# Patient Record
Sex: FEMALE | Race: WHITE | Employment: UNEMPLOYED | RURAL
[De-identification: names, ages, dates, MRNs, and addresses within clinical notes are randomized per-mention and may not be internally consistent; named-entity substitution may affect disease eponyms.]

---

## 2017-06-12 ENCOUNTER — OFFICE VISIT (OUTPATIENT)
Dept: FAMILY MEDICINE CLINIC | Age: 62
End: 2017-06-12

## 2017-06-12 VITALS
OXYGEN SATURATION: 97 % | WEIGHT: 95 LBS | DIASTOLIC BLOOD PRESSURE: 82 MMHG | TEMPERATURE: 97.9 F | RESPIRATION RATE: 18 BRPM | SYSTOLIC BLOOD PRESSURE: 152 MMHG | HEIGHT: 66 IN | HEART RATE: 77 BPM | BODY MASS INDEX: 15.27 KG/M2

## 2017-06-12 DIAGNOSIS — J43.8 OTHER EMPHYSEMA (HCC): Primary | ICD-10-CM

## 2017-06-12 DIAGNOSIS — R63.6 UNDERWEIGHT: ICD-10-CM

## 2017-06-12 DIAGNOSIS — E11.9 DIABETES MELLITUS TYPE 2, DIET-CONTROLLED (HCC): ICD-10-CM

## 2017-06-12 DIAGNOSIS — Z72.0 TOBACCO ABUSE: ICD-10-CM

## 2017-06-12 RX ORDER — FLUTICASONE PROPIONATE 110 UG/1
2 AEROSOL, METERED RESPIRATORY (INHALATION) EVERY 12 HOURS
Qty: 1 INHALER | Refills: 5 | Status: SHIPPED | OUTPATIENT
Start: 2017-06-12 | End: 2017-07-05 | Stop reason: ALTCHOICE

## 2017-06-12 NOTE — PROGRESS NOTES
Reviewed record in preparation for visit and have necessary documentation  Pt did not bring medication to office visit for review  opportunity was given for questions  Goals that were addressed and/or need to be completed during or after this appointment include    Health Maintenance Due   Topic Date Due    DTaP/Tdap/Td series (1 - Tdap) 07/15/1976    ZOSTER VACCINE AGE 60>  07/15/2015    BREAST CANCER SCRN MAMMOGRAM  07/24/2015    EYE EXAM RETINAL OR DILATED Q1  08/06/2016    HEMOGLOBIN A1C Q6M  09/30/2016    MICROALBUMIN Q1  03/30/2017    LIPID PANEL Q1  03/30/2017

## 2017-06-12 NOTE — MR AVS SNAPSHOT
Visit Information Date & Time Provider Department Dept. Phone Encounter #  
 6/12/2017  2:20 PM Tara Walls MD [facility] [id number] Upcoming Health Maintenance Date Due DTaP/Tdap/Td series (1 - Tdap) 7/15/1976 ZOSTER VACCINE AGE 60> 7/15/2015 BREAST CANCER SCRN MAMMOGRAM 7/24/2015 EYE EXAM RETINAL OR DILATED Q1 8/6/2016 HEMOGLOBIN A1C Q6M 9/30/2016 MICROALBUMIN Q1 3/30/2017 LIPID PANEL Q1 3/30/2017 INFLUENZA AGE 9 TO ADULT 8/1/2017 PAP AKA CERVICAL CYTOLOGY 10/22/2017 FOOT EXAM Q1 10/24/2017 Allergies as of 6/12/2017  Review Complete On: 6/12/2017 By: Tara Walls MD  
  
 Severity Noted Reaction Type Reactions Evista [Raloxifene] Medium 10/02/2013   Side Effect Diarrhea, Swelling Makes throat swell Doxycycline  06/11/2010    Swelling Tongue Pcn [Penicillins]  06/05/2010    Swelling Of the tongue Spiriva With Handihaler [Tiotropium Bromide]  06/11/2010    Other (comments) Chest pain Current Immunizations  Reviewed on 10/4/2016 Name Date H1N1 FLU VACCINE 1/25/2010 Influenza Vaccine 10/19/2015, 10/2/2013 Influenza Vaccine (Quad) PF 10/4/2016 Influenza Vaccine Split 10/31/2012, 10/12/2011,  Deferred (Patient Refused) Influenza Vaccine Whole 1/25/2010 Pneumococcal Polysaccharide (PPSV-23) 3/30/2016 Not reviewed this visit You Were Diagnosed With   
  
 Codes Comments Other emphysema ([facility]    -  Primary ICD-10-CM: J43.8 ICD-9-CM: 492.8 Diabetes mellitus type 2, diet-controlled ([facility]     ICD-10-CM: E11.9 ICD-9-CM: 250.00 Vitals BP Pulse Temp Resp Height(growth percentile) Weight(growth percentile) 152/82 (BP 1 Location: Right arm, BP Patient Position: Sitting) 77 97.9 °F (36.6 °C) (Oral) 18 5' 6\" (1.676 m) 95 lb (43.1 kg) SpO2 BMI OB Status Smoking Status 97% 15.33 kg/m2 Postmenopausal Current Some Day Smoker Vitals History BMI and BSA Data Body Mass Index Body Surface Area  
 15.33 kg/m 2 1.42 m 2 Preferred Pharmacy Pharmacy Name Phone [facility] [place] [phone] Your Updated Medication List  
  
   
This list is accurate as of: 6/12/17  3:41 PM.  Always use your most recent med list.  
  
  
  
  
 COMBIVENT RESPIMAT  mcg/actuation inhaler Generic drug:  ipratropium-albuterol USE 1 PUFF BY MOUTH EVERY 6 HOURS  
  
 cyclobenzaprine 10 mg tablet Commonly known as:  FLEXERIL Take 1 Tab by mouth three (3) times daily as needed for Muscle Spasm(s). fluticasone 110 mcg/actuation inhaler Commonly known as:  FLOVENT HFA Take 2 Puffs by inhalation every twelve (12) hours. glucose blood VI test strips strip Commonly known as:  ONETOUCH ULTRA TEST Test blood sugar  daily for DM-2 (250.00) ibandronate 150 mg tablet Commonly known as:  [place] Take 150 mg by mouth every thirty (30) days. Lancets Misc One Touch Lancets-Test blood sugar  daily for DM-2 (250.00) LYRICA 50 mg capsule Generic drug:  pregabalin TAKE 1 CAPSULE BY MOUTH DAILY  
  
 oxyCODONE-acetaminophen  mg per tablet Commonly known as:  PERCOCET 10 Take 1 Tab by mouth. zafirlukast 10 mg tablet Commonly known as:  ACCOLATE  
TAKE 1 TABLET BY MOUTH DAILY WITH LUNCH Prescriptions Sent to Pharmacy Refills  
 fluticasone (FLOVENT HFA) 110 mcg/actuation inhaler 5 Sig: Take 2 Puffs by inhalation every twelve (12) hours. Class: Normal  
 Pharmacy: [facility] [place] Ph #: [phone] Route: Inhalation We Performed the Following CBC WITH AUTOMATED DIFF [42615 CPT(R)] HEMOGLOBIN A1C WITH EAG [78866 CPT(R)] METABOLIC PANEL, COMPREHENSIVE [82941 CPT(R)] TSH 3RD GENERATION [27564 CPT(R)] Introducing [facility]! Porsche Layne introduces Evrent patient portal. Now you can access parts of your medical record, email your doctor's office, and request medication refills online. 1. In your internet browser, go to https://[email or web address]. [employer] 2. Click on the First Time User? Click Here link in the Sign In box. You will see the New Member Sign Up page. 3. Enter your Evrent Access Code exactly as it appears below. You will not need to use this code after youve completed the sign-up process. If you do not sign up before the expiration date, you must request a new code. · Evrent Access Code: [email or web address] Expires: 9/10/2017  1:55 PM 
 
4. Enter the last four digits of your Social Security Number (xxxx) and Date of Birth (mm/dd/yyyy) as indicated and click Submit. You will be taken to the next sign-up page. 5. Create a Evrent ID. This will be your Evrent login ID and cannot be changed, so think of one that is secure and easy to remember. 6. Create a Evrent password. You can change your password at any time. 7. Enter your Password Reset Question and Answer. This can be used at a later time if you forget your password. 8. Enter your e-mail address. You will receive e-mail notification when new information is available in [address] 9. Click Sign Up. You can now view and download portions of your medical record. 10. Click the Download Summary menu link to download a portable copy of your medical information. If you have questions, please visit the Frequently Asked Questions section of the Evrent website. Remember, Evrent is NOT to be used for urgent needs. For medical emergencies, dial 911. Now available from your iPhone and Android! Please provide this summary of care documentation to your next provider. Your primary care clinician is listed as [address]. If you have any questions after today's visit, please call [phone].

## 2017-06-13 LAB
ALBUMIN SERPL-MCNC: 4.7 G/DL (ref 3.6–4.8)
ALBUMIN/GLOB SERPL: 2 {RATIO} (ref 1.2–2.2)
ALP SERPL-CCNC: 66 IU/L (ref 39–117)
ALT SERPL-CCNC: 17 IU/L (ref 0–32)
AST SERPL-CCNC: 24 IU/L (ref 0–40)
BASOPHILS # BLD AUTO: 0.1 X10E3/UL (ref 0–0.2)
BASOPHILS NFR BLD AUTO: 1 %
BILIRUB SERPL-MCNC: 0.2 MG/DL (ref 0–1.2)
BUN SERPL-MCNC: 12 MG/DL (ref 8–27)
BUN/CREAT SERPL: 21 (ref 12–28)
CALCIUM SERPL-MCNC: 10 MG/DL (ref 8.7–10.3)
CHLORIDE SERPL-SCNC: 102 MMOL/L (ref 96–106)
CO2 SERPL-SCNC: 23 MMOL/L (ref 18–29)
CREAT SERPL-MCNC: 0.56 MG/DL (ref 0.57–1)
EOSINOPHIL # BLD AUTO: 0.3 X10E3/UL (ref 0–0.4)
EOSINOPHIL NFR BLD AUTO: 3 %
ERYTHROCYTE [DISTWIDTH] IN BLOOD BY AUTOMATED COUNT: 13.4 % (ref 12.3–15.4)
EST. AVERAGE GLUCOSE BLD GHB EST-MCNC: 108 MG/DL
GLOBULIN SER CALC-MCNC: 2.4 G/DL (ref 1.5–4.5)
GLUCOSE SERPL-MCNC: 63 MG/DL (ref 65–99)
HBA1C MFR BLD: 5.4 % (ref 4.8–5.6)
HCT VFR BLD AUTO: 43.7 % (ref 34–46.6)
HGB BLD-MCNC: 14.6 G/DL (ref 11.1–15.9)
IMM GRANULOCYTES # BLD: 0 X10E3/UL (ref 0–0.1)
IMM GRANULOCYTES NFR BLD: 0 %
LYMPHOCYTES # BLD AUTO: 1.4 X10E3/UL (ref 0.7–3.1)
LYMPHOCYTES NFR BLD AUTO: 14 %
Lab: NORMAL
MCH RBC QN AUTO: 34.7 PG (ref 26.6–33)
MCHC RBC AUTO-ENTMCNC: 33.4 G/DL (ref 31.5–35.7)
MCV RBC AUTO: 104 FL (ref 79–97)
MONOCYTES # BLD AUTO: 0.8 X10E3/UL (ref 0.1–0.9)
MONOCYTES NFR BLD AUTO: 8 %
NEUTROPHILS # BLD AUTO: 7.2 X10E3/UL (ref 1.4–7)
NEUTROPHILS NFR BLD AUTO: 74 %
PLATELET # BLD AUTO: 244 X10E3/UL (ref 150–379)
POTASSIUM SERPL-SCNC: 5.3 MMOL/L (ref 3.5–5.2)
PROT SERPL-MCNC: 7.1 G/DL (ref 6–8.5)
RBC # BLD AUTO: 4.21 X10E6/UL (ref 3.77–5.28)
SODIUM SERPL-SCNC: 143 MMOL/L (ref 134–144)
TSH SERPL DL<=0.005 MIU/L-ACNC: 0.72 UIU/ML (ref 0.45–4.5)
WBC # BLD AUTO: 9.6 X10E3/UL (ref 3.4–10.8)

## 2017-06-19 NOTE — PROGRESS NOTES
Progress Note    Patient: Jg Adam MRN: [id number]  SSN: [id number]    YOB: 1955  Age: 64 y.o. Sex: female        Subjective:     Chief Complaint   Patient presents with    Diabetes    COPD     upset about inhaler refills, needs rescue inhaler        HPI: she is a 64y.o. year old female new to me who presents for follow up of COPD. She continues to smoke and experience SOB. Patient using combivent multiple times daily. Has been prescribed maintenance inhalers. However she does not use these. Patient denies HA, dizziness, CP, abdominal pain, dysuria, acute myalgias or arthralgias. She is underweight with continued weight loss. Encounter Diagnoses   Name Primary?  Other emphysema ([facility] [address] Yes    Underweight     Diabetes mellitus type 2, diet-controlled ([facility]     Tobacco abuse        BP Readings from Last 3 Encounters:   06/12/17 152/82   12/05/16 139/77   10/04/16 134/65     Wt Readings from Last 3 Encounters:   06/12/17 95 lb (43.1 kg)   12/05/16 98 lb (44.5 kg)   10/04/16 97 lb (44 kg)     Body mass index is 15.33 kg/(m^2).     Lab Results   Component Value Date/Time    WBC 9.6 06/12/2017 04:45 PM    HGB 14.6 06/12/2017 04:45 PM    HCT 43.7 06/12/2017 04:45 PM    PLATELET 328 59/87/5096 04:45 PM     06/12/2017 04:45 PM     Lab Results  Component Value Date/Time   TSH 0.722 06/12/2017 04:45 PM      Lab Results   Component Value Date/Time    Sodium 143 06/12/2017 04:45 PM    Potassium 5.3 06/12/2017 04:45 PM    Chloride 102 06/12/2017 04:45 PM    CO2 23 06/12/2017 04:45 PM    Anion gap 9 02/18/2014 04:11 PM    Glucose 63 06/12/2017 04:45 PM    BUN 12 06/12/2017 04:45 PM    Creatinine 0.56 06/12/2017 04:45 PM    BUN/Creatinine ratio 21 06/12/2017 04:45 PM    GFR est  06/12/2017 04:45 PM    GFR est non- 06/12/2017 04:45 PM    Calcium 10.0 06/12/2017 04:45 PM    Bilirubin, total 0.2 06/12/2017 04:45 PM    ALT (SGPT) 17 06/12/2017 04:45 PM    AST (SGOT) 24 06/12/2017 04:45 PM    Alk. phosphatase 66 06/12/2017 04:45 PM    Protein, total 7.1 06/12/2017 04:45 PM    Albumin 4.7 06/12/2017 04:45 PM    A-G Ratio 2.0 06/12/2017 04:45 PM      Lab Results   Component Value Date/Time    Hemoglobin A1c 5.4 06/12/2017 04:45 PM          Current and past medical information:    Current Medications after this visit[de-identified]     Current Outpatient Prescriptions   Medication Sig    fluticasone (FLOVENT HFA) 110 mcg/actuation inhaler Take 2 Puffs by inhalation every twelve (12) hours.  COMBIVENT RESPIMAT  mcg/actuation inhaler USE 1 PUFF BY MOUTH EVERY 6 HOURS    zafirlukast (ACCOLATE) 10 mg tablet TAKE 1 TABLET BY MOUTH DAILY WITH LUNCH    ibandronate (BONIVA) 150 mg tablet Take 150 mg by mouth every thirty (30) days.  cyclobenzaprine (FLEXERIL) 10 mg tablet Take 1 Tab by mouth three (3) times daily as needed for Muscle Spasm(s).  oxyCODONE-acetaminophen (PERCOCET 10)  mg per tablet Take 1 Tab by mouth.  Lancets misc One Touch Lancets-Test blood sugar  daily for DM-2 (250.00)    glucose blood VI test strips (ONE TOUCH ULTRA TEST) strip Test blood sugar  daily for DM-2 (250.00)    LYRICA 50 mg capsule TAKE 1 CAPSULE BY MOUTH DAILY     No current facility-administered medications for this visit.         Patient Active Problem List    Diagnosis Date Noted    Back muscle spasm 06/28/2016    Depression 09/19/2014    Anorexia 09/18/2014    Diabetes mellitus type 2, controlled ([facility] [address] 06/22/2012    Back pain 07/15/2011    Anxiety 02/09/2011    Tobacco abuse 12/06/2010    Asthma 12/06/2010    COPD (chronic obstructive pulmonary disease) ([address] 12/06/2010    Hyperlipemia 06/16/2010    Neuropathy     Fibromyalgia     MS (multiple sclerosis) ([facility] [facility]        Past Medical History:   Diagnosis Date    Anxiety     Asthma 12/6/2010    COPD (chronic obstructive pulmonary disease) ([facility] [facility] 12/6/2010    Depression 9/19/2014    Diabetes (HCC)     Fibromyalgia     Hyperlipemia 6/16/2010    [facility]     Neuropathy     Syncope        Allergies   Allergen Reactions    Evista [Raloxifene] Diarrhea and Swelling     Makes throat swell     Doxycycline Swelling     Tongue      Pcn [Penicillins] Swelling     Of the tongue    Spiriva With Handihaler [Tiotropium Bromide] Other (comments)     Chest pain        Past Surgical History:   Procedure Laterality Date    HX APPENDECTOMY         Social History     Social History    Marital status:      Spouse name: N/A    Number of children: N/A    Years of education: N/A     Social History Main Topics    Smoking status: Current Some Day Smoker     Packs/day: 0.50     Years: 40.00    Smokeless tobacco: Never Used      Comment: states has been trying to stop-given stop smoking information    Alcohol use No    Drug use: No    Sexual activity: Not Asked     Other Topics Concern    None     Social History Narrative         Objective:     Review of Systems:  Constitutional: Negative for fatigue or malaise  Derm: Negative for rash or lesion  HEENT: Negative for acute hearing or vision changes  Cardiovascular: Negative for dizziness, chest pain or palpitations  Respiratory: Negative for cough, wheezing or SOB  Gastreintestinal: Negative for nausea or abdominal pain  Genital/urinary: Negative for dysuria or voiding dysfunction  Muscoloskeletal: Negative for myalgias or arthralgias   Neurological: Negative for headache, weakness or paresthesia  Psychological: Negative for depression or anxiety      Vitals:    06/12/17 1504   BP: 152/82   Pulse: 77   Resp: 18   Temp: 97.9 °F (36.6 °C)   TempSrc: Oral   SpO2: 97%   Weight: 95 lb (43.1 kg)   Height: 5' 6\" (1.676 m)      Body mass index is 15.33 kg/(m^2).     Physical Exam:  Constitutional: well developed, well nourished, in no acute distress  Skin: warm and dry, normal tone and turgor  Head: normocephalic, atraumatic  Eyes: sclera clear, EOMI, PERRL  Neck: normal range of motion  CV: normal S1, S2, regular rate and rhythm  Respiratory: clear to auscultation bilaterally with symmetrical effort  Extremities: full range of motion  Neurology: normal strength and sensation  Psych: active, alert and oriented, affect appropriate     Health Maintenance Due   Topic Date Due    DTaP/Tdap/Td series (1 - Tdap) 07/15/1976    ZOSTER VACCINE AGE 60>  07/15/2015    BREAST CANCER SCRN MAMMOGRAM  07/24/2015    EYE EXAM RETINAL OR DILATED Q1  08/06/2016    MICROALBUMIN Q1  03/30/2017    LIPID PANEL Q1  03/30/2017       Risk, benefits and potential costs of recommended health maintenance discussed. Patient expressed understanding and deferred at this time. Assessment and orders:     Lorena Miller was seen today for diabetes and copd. Diagnoses and all orders for this visit:    Other emphysema ([address]  -     CBC WITH AUTOMATED DIFF  -     fluticasone (FLOVENT HFA) 110 mcg/actuation inhaler; Take 2 Puffs by inhalation every twelve (12) hours. Underweight  -     TSH 3RD GENERATION  -     METABOLIC PANEL, COMPREHENSIVE    Diabetes mellitus type 2, diet-controlled (HCC)  -     HEMOGLOBIN A1C WITH EAG  -     TSH 3RD GENERATION  -     CBC WITH AUTOMATED DIFF  -     METABOLIC PANEL, COMPREHENSIVE  -     DIABETES PATIENT EDUCATION    Tobacco abuse        Plan of care:  Diagnoses were discussed in detail with patient. Strongly advised patient to stop all use of tobacco products. Medication risks/benefits/side effects discussed with patient. All of the patient's questions were addressed. The patient understands and agrees with our plan of care. The patient knows to call back if they are unsure of or forgets any changes we discussed today or if the symptoms change. The patient received an After-Visit Summary which contains VS, orders, allergy and medication lists.        Patient Care Team:  Sunny Ya MD as PCP - General (Family Practice)  Nivia Maharaj MD as Physician (Gastroenterology)    Follow-up Disposition:  Return in about 6 months (around 12/12/2017), or if symptoms worsen or fail to improve. No future appointments.     Signed By: Callie Xavier MD     June 18, 2017

## 2017-06-20 ENCOUNTER — TELEPHONE (OUTPATIENT)
Dept: FAMILY MEDICINE CLINIC | Age: 62
End: 2017-06-20

## 2017-06-20 RX ORDER — ALENDRONATE SODIUM 5 MG/1
5 TABLET ORAL
Qty: 30 TAB | Refills: 5 | Status: SHIPPED | OUTPATIENT
Start: 2017-06-20 | End: 2017-08-31

## 2017-06-20 NOTE — TELEPHONE ENCOUNTER
Insurance no longer covering [facility]. Prior [place]et [place] was denied.     Suggest Alendronate Sodium instead    Verbalized understanding

## 2017-07-03 ENCOUNTER — TELEPHONE (OUTPATIENT)
Dept: FAMILY MEDICINE CLINIC | Age: 62
End: 2017-07-03

## 2017-07-03 DIAGNOSIS — J44.9 CHRONIC OBSTRUCTIVE PULMONARY DISEASE, UNSPECIFIED COPD TYPE (HCC): Primary | ICD-10-CM

## 2017-07-03 DIAGNOSIS — J45.50 UNCOMPLICATED SEVERE PERSISTENT ASTHMA: ICD-10-CM

## 2017-07-05 RX ORDER — FLUTICASONE PROPIONATE AND SALMETEROL 250; 50 UG/1; UG/1
1 POWDER RESPIRATORY (INHALATION) EVERY 12 HOURS
Qty: 1 EACH | Refills: 0 | Status: SHIPPED | OUTPATIENT
Start: 2017-07-05 | End: 2017-08-31

## 2017-07-05 NOTE — TELEPHONE ENCOUNTER
Makes patient feel like she if suffocating. States the Advair diskus helps her better. Can this be re-prescribed?

## 2017-07-05 NOTE — TELEPHONE ENCOUNTER
Pt requesting Advair diskus instead of Flovent. Will put in order for this. If a prior Esperanza Styles is required, will have her PCP fill this out when he gets back to the office as I have never seen this pt.

## 2017-08-31 ENCOUNTER — TELEPHONE (OUTPATIENT)
Dept: FAMILY MEDICINE CLINIC | Age: 62
End: 2017-08-31

## 2017-08-31 ENCOUNTER — OFFICE VISIT (OUTPATIENT)
Dept: FAMILY MEDICINE CLINIC | Age: 62
End: 2017-08-31

## 2017-08-31 VITALS
BODY MASS INDEX: 15.2 KG/M2 | HEART RATE: 74 BPM | SYSTOLIC BLOOD PRESSURE: 137 MMHG | OXYGEN SATURATION: 96 % | TEMPERATURE: 97.3 F | RESPIRATION RATE: 20 BRPM | WEIGHT: 94.6 LBS | HEIGHT: 66 IN | DIASTOLIC BLOOD PRESSURE: 71 MMHG

## 2017-08-31 DIAGNOSIS — Z72.0 TOBACCO ABUSE: ICD-10-CM

## 2017-08-31 DIAGNOSIS — J43.8 OTHER EMPHYSEMA (HCC): Primary | ICD-10-CM

## 2017-08-31 DIAGNOSIS — R63.6 UNDERWEIGHT: ICD-10-CM

## 2017-08-31 NOTE — PROGRESS NOTES
Progress Note    Patient: Nessa Mendoza MRN: [id number]  SSN: [id number]    YOB: 1955  Age: 58 y.o. Sex: female        Subjective:     Chief Complaint   Patient presents with    Asthma    Shortness of Breath    Immunization/Injection     flu       HPI: she is a 58y.o. year old female new to me who presents for follow up of COPD. She continues to smoke and experience SOB. Patient using combivent multiple times daily. Has been prescribed maintenance inhalers. However she does not use these as she says they do not work. Patient denies HA, dizziness, CP, abdominal pain, dysuria, acute myalgias or arthralgias. She is underweight with minimal change in weight today. She says appetite is good. Has been diagnosed with DM2 in the past. She is not on medication and says she was misdiagnosed in [place]. Encounter Diagnoses   Name Primary?  Other emphysema ([facility] [address] Yes       BP Readings from Last 3 Encounters:   08/31/17 137/71   06/12/17 152/82   12/05/16 139/77     Wt Readings from Last 3 Encounters:   08/31/17 94 lb 9.6 oz (42.9 kg)   06/12/17 95 lb (43.1 kg)   12/05/16 98 lb (44.5 kg)     Body mass index is 15.27 kg/(m^2).     Lab Results   Component Value Date/Time    WBC 9.6 06/12/2017 04:45 PM    HGB 14.6 06/12/2017 04:45 PM    HCT 43.7 06/12/2017 04:45 PM    PLATELET 977 65/48/8073 04:45 PM     06/12/2017 04:45 PM     Lab Results  Component Value Date/Time   TSH 0.722 06/12/2017 04:45 PM      Lab Results   Component Value Date/Time    Sodium 143 06/12/2017 04:45 PM    Potassium 5.3 06/12/2017 04:45 PM    Chloride 102 06/12/2017 04:45 PM    CO2 23 06/12/2017 04:45 PM    Anion gap 9 02/18/2014 04:11 PM    Glucose 63 06/12/2017 04:45 PM    BUN 12 06/12/2017 04:45 PM    Creatinine 0.56 06/12/2017 04:45 PM    BUN/Creatinine ratio 21 06/12/2017 04:45 PM    GFR est  06/12/2017 04:45 PM    GFR est non- 06/12/2017 04:45 PM    Calcium 10.0 06/12/2017 04:45 PM    Bilirubin, total 0.2 06/12/2017 04:45 PM    ALT (SGPT) 17 06/12/2017 04:45 PM    AST (SGOT) 24 06/12/2017 04:45 PM    Alk. phosphatase 66 06/12/2017 04:45 PM    Protein, total 7.1 06/12/2017 04:45 PM    Albumin 4.7 06/12/2017 04:45 PM    A-G Ratio 2.0 06/12/2017 04:45 PM      Lab Results   Component Value Date/Time    Hemoglobin A1c 5.4 06/12/2017 04:45 PM          Current and past medical information:    Current Medications after this visit[de-identified]     Current Outpatient Prescriptions   Medication Sig    LYRICA 50 mg capsule TAKE 1 CAPSULE BY MOUTH DAILY    zafirlukast (ACCOLATE) 10 mg tablet TAKE 1 TABLET BY MOUTH DAILY WITH LUNCH    cyclobenzaprine (FLEXERIL) 10 mg tablet Take 1 Tab by mouth three (3) times daily as needed for Muscle Spasm(s).  oxyCODONE-acetaminophen (PERCOCET 10)  mg per tablet Take 1 Tab by mouth.  Lancets misc One Touch Lancets-Test blood sugar  daily for DM-2 (250.00)    glucose blood VI test strips (ONE TOUCH ULTRA TEST) strip Test blood sugar  daily for DM-2 (250.00)    fluticasone-salmeterol (ADVAIR) 250-50 mcg/dose diskus inhaler Take 1 Puff by inhalation every twelve (12) hours.  COMBIVENT RESPIMAT  mcg/actuation inhaler USE 1 PUFF BY MOUTH EVERY 6 HOURS     No current facility-administered medications for this visit.         Patient Active Problem List    Diagnosis Date Noted    Back muscle spasm 06/28/2016    Depression 09/19/2014    Anorexia 09/18/2014    Diabetes mellitus type 2, controlled ([facility] 06/22/2012    Back pain 07/15/2011    Anxiety 02/09/2011    Tobacco abuse 12/06/2010    Asthma 12/06/2010    COPD (chronic obstructive pulmonary disease) ([facility] 12/06/2010    Hyperlipemia 06/16/2010    Neuropathy ([facility]     Fibromyalgia     MS [facility] sclerosis) ([facility]        Past Medical History:   Diagnosis Date    Anxiety     Asthma 12/6/2010    COPD (chronic obstructive pulmonary disease) ([facility] 12/6/2010    Depression 9/19/2014    Diabetes ([facility]     Fibromyalgia     Hyperlipemia 6/16/2010    MS (multiple sclerosis) (HCC)     Neuropathy (HCC)     Syncope        Allergies   Allergen Reactions    Evista [Raloxifene] Diarrhea and Swelling     Makes throat swell     Doxycycline Swelling     Tongue      Pcn [Penicillins] Swelling     Of the tongue    Spiriva With Handihaler [Tiotropium Bromide] Other (comments)     Chest pain        Past Surgical History:   Procedure Laterality Date    HX APPENDECTOMY         Social History     Social History    Marital status:      Spouse name: N/A    Number of children: N/A    Years of education: N/A     Social History Main Topics    Smoking status: Current Some Day Smoker     Packs/day: 0.50     Years: 40.00    Smokeless tobacco: Never Used      Comment: states has been trying to stop-given stop smoking information    Alcohol use No    Drug use: No    Sexual activity: Not Asked     Other Topics Concern    None     Social History Narrative         Objective:     Review of Systems:  Constitutional: Negative for fatigue or malaise  Derm: Negative for rash or lesion  HEENT: Negative for acute hearing or vision changes  Cardiovascular: Negative for dizziness, chest pain or palpitations  Respiratory: Negative for cough, wheezing or SOB  Gastreintestinal: Negative for nausea or abdominal pain  Genital/urinary: Negative for dysuria or voiding dysfunction  Muscoloskeletal: Negative for myalgias or arthralgias   Neurological: Negative for headache, weakness or paresthesia  Psychological: Negative for depression or anxiety      Vitals:    08/31/17 1335   BP: 137/71   Pulse: 74   Resp: 20   Temp: 97.3 °F (36.3 °C)   TempSrc: Oral   SpO2: 96%   Weight: 94 lb 9.6 oz (42.9 kg)   Height: 5' 6\" (1.676 m)      Body mass index is 15.27 kg/(m^2).     Physical Exam:  Constitutional: thin, in no acute distress  Skin: warm and dry, normal tone and turgor  Head: normocephalic, atraumatic  Eyes: sclera clear, EOMI, PERRL  Neck: normal range of motion  CV: normal S1, S2, regular rate and rhythm  Respiratory: clear to auscultation bilaterally with symmetrical effort  Extremities: full range of motion  Neurology: normal strength and sensation  Psych: active, alert and oriented, affect appropriate     Health Maintenance Due   Topic Date Due    DTaP/Tdap/Td series (1 - Tdap) 07/15/1976    ZOSTER VACCINE AGE 60>  05/15/2015    BREAST CANCER SCRN MAMMOGRAM  07/24/2015    EYE EXAM RETINAL OR DILATED Q1  08/06/2016    MICROALBUMIN Q1  03/30/2017    LIPID PANEL Q1  03/30/2017    INFLUENZA AGE 9 TO ADULT  08/01/2017    PAP AKA CERVICAL CYTOLOGY  10/22/2017       Risk, benefits and potential costs of recommended health maintenance discussed. Patient expressed understanding and declined at this time. Assessment and orders:     Diagnoses and all orders for this visit:    1. Other emphysema ([address]        Plan of care:  Diagnoses were discussed in detail with patient. Strongly advised patient to stop all use of tobacco products. Medication risks/benefits/side effects discussed with patient. All of the patient's questions were addressed. The patient understands and agrees with our plan of care. The patient knows to call back if they are unsure of or forgets any changes we discussed today or if the symptoms change. The patient received an After-Visit Summary which contains VS, orders, allergy and medication lists. Patient Care Team:  Tonya Alegre MD as PCP - General (Family Practice)  Marcella Valadez MD as Physician (Gastroenterology)    Follow-up Disposition: Not on File    No future appointments.     Signed By: Julius Lawton MD     August 31, 2017

## 2017-08-31 NOTE — TELEPHONE ENCOUNTER
Patient wanted to let you know she check with her Pharmacy and she did have the pneumonia Vaccine on 10-24-16 of last year and Dr Chasity Parsons was the one that gave her the order to do it   She has the record of it from her pharmacy and can bring it to her next appt    Thanks    Rafaela Cheng

## 2017-08-31 NOTE — PATIENT INSTRUCTIONS
Learning About COPD Triggers  What are triggers? When you have COPD (chronic obstructive pulmonary disease), certain things can make your symptoms worse. These are called triggers. They include:  · Cigarette smoke or air pollution. · Illnesses like colds, flu, or pneumonia. · Cleaning supplies or other chemicals. · Gases, particles, or fumes from wood or kerosene home heaters. Not all people have the same triggers. What may cause symptoms in one person may not be a problem for another person. How do triggers affect COPD? Triggers can make it harder for your lungs to work as they should and can lead to sudden difficulty breathing and other symptoms. When you are around a trigger, a COPD flare-up is more likely. If your symptoms are severe, you may need emergency treatment or have to go to the hospital for treatment. If you know what your triggers are and can avoid them, you can reduce how often you have flare-ups and how much COPD affects your life. It's also important to be active and to take your daily medicines as prescribed. This helps prevent flare-ups too. What can you do to avoid triggers? The first thing is to know your triggers. When you are having symptoms, note the things around you that might be causing them. Then look for patterns in what may be triggering your symptoms. When you have your list of possible triggers, work with your doctor to find ways to avoid them. Here are some ways to avoid a few common triggers. · Do not smoke or allow others to smoke around you. If you need help quitting, talk to your doctor about stop-smoking programs and medicines. These can increase your chances of quitting for good. · If there is a lot of pollution, pollen, or dust outside, stay at home and keep your windows closed. Use an air conditioner or air filter in your home. Check your local weather report or newspaper for air quality and pollen reports. · Get a flu vaccine every year.  Talk to your doctor about getting a pneumococcal shot. Wash your hands often to prevent infections. How can you manage a flare-up? Do not panic if you start to have a COPD flare-up. · If you have a COPD action plan, follow the plan. In general:  ¨ Use your quick-relief inhaler as directed by your doctor. If your symptoms do not get better after you use your medicine, have someone take you to the emergency room. Call an ambulance if needed. ¨ Use a spacer with your metered-dose inhaler (MDI). If you have a nebulizer for inhaled medicine, use it. A spacer or nebulizer may help get more medicine to your lungs. ¨ If your doctor has given you other inhaled medicines or steroid pills, take them as directed. ¨ If your doctor has given you a prescription for an antibiotic, fill it if you need to. ¨ Call your doctor if you have to use your antibiotic or steroid pills. Where can you learn more? Go to http://valentina-elvis.info/. Enter W418 in the search box to learn more about \"Learning About COPD Triggers. \"  Current as of: March 25, 2017  Content Version: 11.3  © 3854-6074 [employer]. Care instructions adapted under license by [employer] (which disclaims liability or warranty for this information). If you have questions about a medical condition or this instruction, always ask your healthcare professional. [address] any warranty or liability for your use of this information.

## 2017-08-31 NOTE — PROGRESS NOTES
Health Maintenance Due   Topic Date Due    DTaP/Tdap/Td series (1 - Tdap) 07/15/1976    ZOSTER VACCINE AGE 60>  05/15/2015    BREAST CANCER SCRN MAMMOGRAM  07/24/2015    EYE EXAM RETINAL OR DILATED Q1  08/06/2016    MICROALBUMIN Q1  03/30/2017    LIPID PANEL Q1  03/30/2017    INFLUENZA AGE 9 TO ADULT  08/01/2017    PAP AKA CERVICAL CYTOLOGY  10/22/2017       Diabetic Bundle:  LDL-123  A1C-5.4  BP-  Smoking?no  Anticoagulation medication? Eye exam dilated?   Foot exam?

## 2017-08-31 NOTE — MR AVS SNAPSHOT
Visit Information Date & Time Provider Department Dept. Phone Encounter #  
 8/31/2017  1:20 PM Viral Yin MD [facility] [id number] Follow-up Instructions Return in about 6 months (around 2/28/2018), or if symptoms worsen or fail to improve. Upcoming Health Maintenance Date Due DTaP/Tdap/Td series (1 - Tdap) 7/15/1976 ZOSTER VACCINE AGE 60> 5/15/2015 BREAST CANCER SCRN MAMMOGRAM 7/24/2015 EYE EXAM RETINAL OR DILATED Q1 8/6/2016 MICROALBUMIN Q1 3/30/2017 LIPID PANEL Q1 3/30/2017 INFLUENZA AGE 9 TO ADULT 8/1/2017 PAP AKA CERVICAL CYTOLOGY 10/22/2017 FOOT EXAM Q1 10/24/2017 HEMOGLOBIN A1C Q6M 12/12/2017 Allergies as of 8/31/2017  Review Complete On: 8/31/2017 By: Anny Aceves Severity Noted Reaction Type Reactions Evista [Raloxifene] Medium 10/02/2013   Side Effect Diarrhea, Swelling Makes throat swell Doxycycline  06/11/2010    Swelling Tongue Pcn [Penicillins]  06/05/2010    Swelling Of the tongue Spiriva With Handihaler [Tiotropium Bromide]  06/11/2010    Other (comments) Chest pain Current Immunizations  Reviewed on 10/4/2016 Name Date H1N1 FLU VACCINE 1/25/2010 Influenza Vaccine 10/19/2015, 10/2/2013 Influenza Vaccine (Quad) PF 10/4/2016 Influenza Vaccine Split 10/31/2012, 10/12/2011,  Deferred (Patient Refused) Influenza Vaccine Whole 1/25/2010 Pneumococcal Polysaccharide (PPSV-23) 3/30/2016 Not reviewed this visit You Were Diagnosed With   
  
 Codes Comments Other emphysema ([facility].)    -  Primary ICD-10-CM: J43.8 ICD-9-CM: 492.8 Vitals BP Pulse Temp Resp Height(growth percentile) Weight(growth percentile)  
 137/71 74 97.3 °F (36.3 °C) (Oral) 20 5' 6\" (1.676 m) 94 lb 9.6 oz (42.9 kg) SpO2 BMI OB Status Smoking Status 96% 15.27 kg/m2 Postmenopausal Current Some Day Smoker Vitals History BMI and BSA Data Body Mass Index Body Surface Area  
 15.27 kg/m 2 1.41 m 2 Preferred Pharmacy Pharmacy Name Phone [facility] [phone] Your Updated Medication List  
  
   
This list is accurate as of: 17  2:12 PM.  Always use your most recent med list.  
  
  
  
  
 cyclobenzaprine 10 mg tablet Commonly known as:  FLEXERIL Take 1 Tab by mouth three (3) times daily as needed for Muscle Spasm(s). glucose blood VI test strips strip Commonly known as:  ONETOUCH ULTRA TEST Test blood sugar  daily for DM-2 (250.00) ipratropium-albuterol  mcg/actuation inhaler Commonly known as:  COMBIVENT RESPIMAT  
USE 1 PUFF BY MOUTH EVERY 6 HOURS Lancets Misc One Touch Lancets-Test blood sugar  daily for DM-2 (250.00) LYRICA 50 mg capsule Generic drug:  pregabalin TAKE 1 CAPSULE BY MOUTH DAILY  
  
 oxyCODONE-acetaminophen  mg per tablet Commonly known as:  PERCOCET 10 Take 1 Tab by mouth.  
  
 pneumococcal 13 gabriele conj dip 0.5 mL Syrg injection Commonly known as:  PREVNAR-13  
0.5 mL by IntraMUSCular route once for 1 dose. zafirlukast 10 mg tablet Commonly known as:  ACCOLATE  
TAKE 1 TABLET BY MOUTH DAILY WITH LUNCH Prescriptions Printed Refills  
 pneumococcal 13 gabriele conj dip (PREVNAR-13) 0.5 mL syrg injection 0 Si.5 mL by IntraMUSCular route once for 1 dose. Class: Print Route: IntraMUSCular Prescriptions Sent to Pharmacy Refills  
 ipratropium-albuterol (COMBIVENT RESPIMAT)  mcg/actuation inhaler 3 Sig: USE 1 PUFF BY MOUTH EVERY 6 HOURS Class: Normal  
 Pharmacy:  [facility] Ph #: [phone] Follow-up Instructions Return in about 6 months (around 2018), or if symptoms worsen or fail to improve. Patient Instructions Learning About COPD Triggers What are triggers? When you have COPD (chronic obstructive pulmonary disease), certain things can make your symptoms worse. These are called triggers. They include: · Cigarette smoke or air pollution. · Illnesses like colds, flu, or pneumonia. · Cleaning supplies or other chemicals. · Gases, particles, or fumes from wood or kerosene home heaters. Not all people have the same triggers. What may cause symptoms in one person may not be a problem for another person. How do triggers affect COPD? Triggers can make it harder for your lungs to work as they should and can lead to sudden difficulty breathing and other symptoms. When you are around a trigger, a COPD flare-up is more likely. If your symptoms are severe, you may need emergency treatment or have to go to the hospital for treatment. If you know what your triggers are and can avoid them, you can reduce how often you have flare-ups and how much COPD affects your life. It's also important to be active and to take your daily medicines as prescribed. This helps prevent flare-ups too. What can you do to avoid triggers? The first thing is to know your triggers. When you are having symptoms, note the things around you that might be causing them. Then look for patterns in what may be triggering your symptoms. When you have your list of possible triggers, work with your doctor to find ways to avoid them. Here are some ways to avoid a few common triggers. · Do not smoke or allow others to smoke around you. If you need help quitting, talk to your doctor about stop-smoking programs and medicines. These can increase your chances of quitting for good. · If there is a lot of pollution, pollen, or dust outside, stay at home and keep your windows closed. Use an air conditioner or air filter in your home. Check your local weather report or newspaper for air quality and pollen reports. · Get a flu vaccine every year. Talk to your doctor about getting a pneumococcal shot. Wash your hands often to prevent infections. How can you manage a flare-up? Do not panic if you start to have a COPD flare-up. · If you have a COPD action plan, follow the plan. In general: ¨ Use your quick-relief inhaler as directed by your doctor. If your symptoms do not get better after you use your medicine, have someone take you to the emergency room. Call an ambulance if needed. ¨ Use a spacer with your metered-dose inhaler (MDI). If you have a nebulizer for inhaled medicine, use it. A spacer or nebulizer may help get more medicine to your lungs. ¨ If your doctor has given you other inhaled medicines or steroid pills, take them as directed. ¨ If your doctor has given you a prescription for an antibiotic, fill it if you need to. ¨ Call your doctor if you have to use your antibiotic or steroid pills. Where can you learn more? Go to http://valentina-elvis.info/. Enter T841 in the search box to learn more about \"Learning About COPD Triggers. \" Current as of: March 25, 2017 Content Version: 11.3 © 2451-3040 [employer]. Care instructions adapted under license by [employer] (which disclaims liability or warranty for this information). If you have questions about a medical condition or this instruction, always ask your healthcare professional. [address] any warranty or liability for your use of this information. Introducing [facility] & HEALTH SERVICES! Alon Dash introduces [employer] patient portal. Now you can access parts of your medical record, email your doctor's office, and request medication refills online. 1. In your internet browser, go to [email or web address] 2. Click on the First Time User? Click Here link in the Sign In box. You will see the New Member Sign Up page. 3. Enter your PrivateGriffe Access Code exactly as it appears below. You will not need to use this code after youve completed the sign-up process. If you do not sign up before the expiration date, you must request a new code. · [employer] Access Code: [email or web address] Expires: 9/10/2017  1:55 PM 
 
4. Enter the last four digits of your Social Security Number (xxxx) and Date of Birth (mm/dd/yyyy) as indicated and click Submit. You will be taken to the next sign-up page. 5. Create a PrivateGriffe ID. This will be your PrivateGriffe login ID and cannot be changed, so think of one that is secure and easy to remember. 6. Create a PrivateGriffe password. You can change your password at any time. 7. Enter your Password Reset Question and Answer. This can be used at a later time if you forget your password. 8. Enter your e-mail address. You will receive e-mail notification when new information is available in [address] 9. Click Sign Up. You can now view and download portions of your medical record. 10. Click the Download Summary menu link to download a portable copy of your medical information. If you have questions, please visit the Frequently Asked Questions section of the PrivateGriffe website. Remember, PrivateGriffe is NOT to be used for urgent needs. For medical emergencies, dial 911. Now available from your iPhone and Android! Please provide this summary of care documentation to your next provider. Your primary care clinician is listed as [address]. If you have any questions after today's visit, please call [phone].

## 2017-09-13 DIAGNOSIS — J44.9 CHRONIC OBSTRUCTIVE PULMONARY DISEASE, UNSPECIFIED COPD TYPE (HCC): ICD-10-CM

## 2017-09-14 RX ORDER — IBANDRONATE SODIUM 150 MG/1
150 TABLET, FILM COATED ORAL
Qty: 3 TAB | Refills: 3 | Status: SHIPPED | OUTPATIENT
Start: 2017-09-14 | End: 2018-09-04 | Stop reason: SDUPTHER

## 2018-01-24 ENCOUNTER — TELEPHONE (OUTPATIENT)
Dept: FAMILY MEDICINE CLINIC | Age: 63
End: 2018-01-24

## 2018-01-24 ENCOUNTER — OFFICE VISIT (OUTPATIENT)
Dept: FAMILY MEDICINE CLINIC | Age: 63
End: 2018-01-24

## 2018-01-24 VITALS
HEIGHT: 66 IN | BODY MASS INDEX: 14.27 KG/M2 | WEIGHT: 88.8 LBS | TEMPERATURE: 97 F | SYSTOLIC BLOOD PRESSURE: 153 MMHG | RESPIRATION RATE: 18 BRPM | DIASTOLIC BLOOD PRESSURE: 86 MMHG | OXYGEN SATURATION: 94 % | HEART RATE: 84 BPM

## 2018-01-24 DIAGNOSIS — E11.9 DIABETES MELLITUS TYPE 2, DIET-CONTROLLED (HCC): ICD-10-CM

## 2018-01-24 DIAGNOSIS — J44.1 COPD EXACERBATION (HCC): ICD-10-CM

## 2018-01-24 DIAGNOSIS — J44.1 COPD EXACERBATION (HCC): Primary | ICD-10-CM

## 2018-01-24 DIAGNOSIS — R63.4 WEIGHT LOSS: ICD-10-CM

## 2018-01-24 DIAGNOSIS — Z72.0 TOBACCO ABUSE: ICD-10-CM

## 2018-01-24 DIAGNOSIS — J43.8 OTHER EMPHYSEMA (HCC): ICD-10-CM

## 2018-01-24 RX ORDER — IPRATROPIUM BROMIDE AND ALBUTEROL SULFATE 2.5; .5 MG/3ML; MG/3ML
3 SOLUTION RESPIRATORY (INHALATION)
Qty: 1 NEBULE | Refills: 0 | Status: SHIPPED | OUTPATIENT
Start: 2018-01-24 | End: 2018-01-25 | Stop reason: SDUPTHER

## 2018-01-24 RX ORDER — AZITHROMYCIN 250 MG/1
TABLET, FILM COATED ORAL
Qty: 6 TAB | Refills: 0 | Status: SHIPPED | OUTPATIENT
Start: 2018-01-24 | End: 2019-07-22 | Stop reason: SDUPTHER

## 2018-01-24 RX ORDER — PREDNISONE 20 MG/1
20 TABLET ORAL 2 TIMES DAILY
Qty: 10 TAB | Refills: 0 | Status: SHIPPED | OUTPATIENT
Start: 2018-01-24 | End: 2019-07-22 | Stop reason: SDUPTHER

## 2018-01-24 NOTE — MR AVS SNAPSHOT
Trini Colette 
 
 
 [address] [address] 
[phone] Patient: Milan Baca MRN: [id number] PTY:[birth year] Visit Information Date & Time Provider Department Dept. Phone Encounter #  
 1/24/2018  3:40 PM Dolores Weiner  [facility] [phone] [id number] Your Appointments 2/28/2018  1:00 PM  
ROUTINE CARE with Dolores Weiner MD  
[facility] App[facility] Note: 6 mnth fu / [address] [address]  
[place] [address] [place] [address] [address] [address] Upcoming Health Maintenance Date Due DTaP/Tdap/Td series (1 - Tdap) 7/15/1976 ZOSTER VACCINE AGE 60> 5/15/2015 BREAST CANCER SCRN MAMMOGRAM 7/24/2015 EYE EXAM RETINAL OR DILATED Q1 8/6/2016 MICROALBUMIN Q1 3/30/2017 LIPID PANEL Q1 3/30/2017 Influenza Age 5 to Adult 8/1/2017 PAP AKA CERVICAL CYTOLOGY 10/22/2017 FOOT EXAM Q1 10/24/2017 HEMOGLOBIN A1C Q6M 12/12/2017 Allergies as of 1/24/2018  Review Complete On: 1/24/2018 By: Dolores Weiner MD  
  
 Severity Noted Reaction Type Reactions Evista [Raloxifene] Medium 10/02/2013   Side Effect Diarrhea, Swelling Makes throat swell Doxycycline  06/11/2010    Swelling Tongue Pcn [Penicillins]  06/05/2010    Swelling Of the tongue Spiriva With Handihaler [Tiotropium Bromide]  06/11/2010    Other (comments) Chest pain Current Immunizations  Reviewed on 8/31/2017 Name Date H1N1 FLU VACCINE 1/25/2010 Influenza Vaccine 10/19/2015, 10/2/2013 Influenza Vaccine (Quad) PF 10/4/2016 Influenza Vaccine Split 10/31/2012, 10/12/2011,  Deferred (Patient Refused) Influenza Vaccine Whole 1/25/2010 Pneumococcal Polysaccharide (PPSV-23) 3/30/2016, 3/16/2009 Not reviewed this visit You Were Diagnosed With   
  
 Codes Comments COPD exacerbation ([facility]    -  Primary ICD-10-CM: J44.1 ICD-9-CM: 491.21 Other emphysema ([facility].)     ICD-10-CM: J43.8 ICD-9-CM: 492.8 Weight loss     ICD-10-CM: R63.4 ICD-9-CM: 783.21 Diabetes mellitus type 2, diet-controlled ([facility].)     ICD-10-CM: E11.9 ICD-9-CM: 250.00 Vitals BP Pulse Temp Resp Height(growth percentile) Weight(growth percentile) 153/86 (BP 1 Location: Right arm, BP Patient Position: Sitting) 84 97 °F (36.1 °C) (Oral) 18 5' 6\" (1.676 m) 88 lb 12.8 oz (40.3 kg) SpO2 BMI OB Status Smoking Status 94% 14.33 kg/m2 Postmenopausal Current Some Day Smoker Vitals History BMI and BSA Data Body Mass Index Body Surface Area  
 14.33 kg/m 2 1.37 m 2 Preferred Pharmacy Pharmacy Name Phone [address] [place] [address] [phone] Your Updated Medication List  
  
   
This list is accurate as of: 1/24/18  4:31 PM.  Always use your most recent med list.  
  
  
  
  
 azithromycin 250 mg tablet Commonly known as:  Felix Gaxiolae Take 2 tablets today, then take 1 tablet daily  
  
 cyclobenzaprine 10 mg tablet Commonly known as:  FLEXERIL Take 1 Tab by mouth three (3) times daily as needed for Muscle Spasm(s). glucose blood VI test strips strip Commonly known as:  ONETOUCH ULTRA TEST Test blood sugar  daily for DM-2 (250.00) ibandronate 150 mg tablet Commonly known as:  Timmothy Balzarine Take 150 mg by mouth every thirty (30) days. * ipratropium-albuterol  mcg/actuation inhaler Commonly known as:  COMBIVENT RESPIMAT  
USE 1 PUFF BY MOUTH EVERY 6 HOURS  
  
 * albuterol-ipratropium 2.5 mg-0.5 mg/3 ml Nebu Commonly known as:  DUO-NEB  
3 mL by Nebulization route now for 1 dose. Lancets Misc One Touch Lancets-Test blood sugar  daily for DM-2 (250.00)  
  
 oxyCODONE-acetaminophen  mg per tablet Commonly known as:  PERCOCET 10 Take 1 Tab by mouth. predniSONE 20 mg tablet Commonly known as:  Merwyn Going Take 1 Tab by mouth two (2) times a day for 5 days. zafirlukast 10 mg tablet Commonly known as:  ACCOLATE  
TAKE 1 TABLET BY MOUTH DAILY WITH LUNCH  
  
 * Notice: This list has 2 medication(s) that are the same as other medications prescribed for you. Read the directions carefully, and ask your doctor or other care provider to review them with you. Prescriptions Sent to Pharmacy Refills  
 albuterol-ipratropium (DUO-NEB) 2.5 mg-0.5 mg/3 ml nebu 0 Sig: 3 mL by Nebulization route now for 1 dose. Class: Normal  
 Pharmacy: [address] Ph #: [phone] Route: Nebulization  
 azithromycin (ZITHROMAX) 250 mg tablet 0 Sig: Take 2 tablets today, then take 1 tablet daily Class: Normal  
 Pharmacy: [address] Ph #: [phone]  
 predniSONE (DELTASONE) 20 mg tablet 0 Sig: Take 1 Tab by mouth two (2) times a day for 5 days. Class: Normal  
 Pharmacy: [address] Ph #: [phone] Route: Oral  
  
We Performed the Following ALBUTEROL IPRATROP NON-COMP [id number] [facility]] CBC WITH AUTOMATED DIFF [37195 CPT(R)] HEMOGLOBIN A1C WITH EAG [60797 CPT(R)] LIPID PANEL [00232 CPT(R)] MAGNESIUM [id number] CPT(R)] METABOLIC PANEL, COMPREHENSIVE [11455 CPT(R)] DE PRESSURIZED/NONPRESSURIZED INHALATION TREATMENT [id number] CPT(R)] TSH 3RD GENERATION [60661 CPT(R)] To-Do List   
 01/24/2018 Imaging:  XR CHEST PA LAT Patient Instructions COPD Exacerbation Plan: Care Instructions Your Care Instructions If you have chronic obstructive pulmonary disease (COPD), your usual shortness of breath could suddenly get worse. You may start coughing more and have more mucus. This flare-up is called a COPD exacerbation (say \"vd-HGI-mm-BAY-shun\"). A lung infection or air pollution could set off an exacerbation. Sometimes it can happen after a quick change in temperature or being around chemicals. Work with your doctor to make a plan for dealing with an exacerbation. You can better manage it if you plan ahead. Follow-up care is a key part of your treatment and safety. Be sure to make and go to all appointments, and call your doctor if you are having problems. It's also a good idea to know your test results and keep a list of the medicines you take. How can you care for yourself at home? During an exacerbation · Do not panic if you start to have one. Quick treatment at home may help you prevent serious breathing problems. If you have a COPD exacerbation plan that you developed with your doctor, follow it. · Take your medicines exactly as your doctor tells you. ¨ Use your inhaler as directed by your doctor. If your symptoms do not get better after you use your medicine, have someone take you to the emergency room. Call an ambulance if necessary. ¨ With inhaled medicines, a spacer or a nebulizer may help you get more medicine to your lungs. Ask your doctor or pharmacist how to use them properly. Practice using the spacer in front of a mirror before you have an exacerbation. This may help you get the medicine into your lungs quickly. ¨ If your doctor has given you steroid pills, take them as directed. ¨ Your doctor may have given you a prescription for antibiotics, which you can fill if you need it. ¨ Talk to your doctor if you have any problems with your medicine. And call your doctor if you have to use your antibiotic or steroid pills. Preventing an exacerbation · Do not smoke. This is the most important step you can take to prevent more damage to your lungs and prevent problems. If you already smoke, it is never too late to stop.  If you need help quitting, talk to your doctor about stop-smoking programs and medicines. These can increase your chances of quitting for good. · Take your daily medicines as prescribed. · Avoid colds and flu. ¨ Get a pneumococcal vaccine. ¨ Get a flu vaccine each year, as soon as it is available. Ask those you live or work with to do the same, so they will not get the flu and infect you. ¨ Try to stay away from people with colds or the flu. ¨ Wash your hands often. · Avoid secondhand smoke; air pollution; cold, dry air; hot, humid air; and high altitudes. Stay at home with your windows closed when air pollution is bad. · Learn breathing techniques for COPD, such as breathing through pursed lips. These techniques can help you breathe easier during an exacerbation. When should you call for help? Call 911 anytime you think you may need emergency care. For example, call if: 
? · You have severe trouble breathing. ? · You have severe chest pain. ?Call your doctor now or seek immediate medical care if: 
? · You have new or worse shortness of breath. ? · You develop new chest pain. ? · You are coughing more deeply or more often, especially if you notice more mucus or a change in the color of your mucus. ? · You cough up blood. ? · You have new or increased swelling in your legs or belly. ? · You have a fever. ? Watch closely for changes in your health, and be sure to contact your doctor if: 
? · You need to use your antibiotic or steroid pills. ? · Your symptoms are getting worse. Where can you learn more? Go to http://valentina-elvis.info/. Enter Q056 in the search box to learn more about \"COPD Exacerbation Plan: Care Instructions. \" Current as of: May 12, 2017 Content Version: 11.4 © 6741-3988 [employer]. Care instructions adapted under license by [employer] (which disclaims liability or warranty for this information).  If you have questions about a medical condition or this instruction, always ask your healthcare professional. [place] [address] any warranty or liability for your use of this information. Introducing [facility] & [facility]! Henna Doll introduces [employer] patient portal. Now you can access parts of your medical record, email your doctor's office, and request medication refills online. 1. In your internet browser, go to [email or web address] 2. Click on the First Time User? Click Here link in the Sign In box. You will see the New Member Sign Up page. 3. Enter your [employer] Access Code exactly as it appears below. You will not need to use this code after youve completed the sign-up process. If you do not sign up before the expiration date, you must request a new code. · [employer] Access Code: [email or web address] Expires: 4/24/2018  3:14 PM 
 
4. Enter the last four digits of your Social Security Number (xxxx) and Date of Birth (mm/dd/yyyy) as indicated and click Submit. You will be taken to the next sign-up page. 5. Create a [employer] ID. This will be your [employer] login ID and cannot be changed, so think of one that is secure and easy to remember. 6. Create a [employer] password. You can change your password at any time. 7. Enter your Password Reset Question and Answer. This can be used at a later time if you forget your password. 8. Enter your e-mail address. You will receive e-mail notification when new information is available in [address] 9. Click Sign Up. You can now view and download portions of your medical record. 10. Click the Download Summary menu link to download a portable copy of your medical information. If you have questions, please visit the Frequently Asked Questions section of the [employer] website. Remember, [employer] is NOT to be used for urgent needs. For medical emergencies, dial 911. Now available from your iPhone and Android! Please provide this summary of care documentation to your next provider. Your primary care clinician is listed as [address]. If you have any questions after today's visit, please call [phone].

## 2018-01-24 NOTE — TELEPHONE ENCOUNTER
Padmini with [employer] called in regards to Duo-neb. She states it was ordered for one dose  But it only comes as 90 ml or 180ml.  Please advise [phone]

## 2018-01-24 NOTE — PATIENT INSTRUCTIONS
COPD Exacerbation Plan: Care Instructions  Your Care Instructions    If you have chronic obstructive pulmonary disease (COPD), your usual shortness of breath could suddenly get worse. You may start coughing more and have more mucus. This flare-up is called a COPD exacerbation (say \"[id number]\"). A lung infection or air pollution could set off an exacerbation. Sometimes it can happen after a quick change in temperature or being around chemicals. Work with your doctor to make a plan for dealing with an exacerbation. You can better manage it if you plan ahead. Follow-up care is a key part of your treatment and safety. Be sure to make and go to all appointments, and call your doctor if you are having problems. It's also a good idea to know your test results and keep a list of the medicines you take. How can you care for yourself at home? During an exacerbation  · Do not panic if you start to have one. Quick treatment at home may help you prevent serious breathing problems. If you have a COPD exacerbation plan that you developed with your doctor, follow it. · Take your medicines exactly as your doctor tells you. ¨ Use your inhaler as directed by your doctor. If your symptoms do not get better after you use your medicine, have someone take you to the emergency room. Call an ambulance if necessary. ¨ With inhaled medicines, a spacer or a nebulizer may help you get more medicine to your lungs. Ask your doctor or pharmacist how to use them properly. Practice using the spacer in front of a mirror before you have an exacerbation. This may help you get the medicine into your lungs quickly. ¨ If your doctor has given you steroid pills, take them as directed. ¨ Your doctor may have given you a prescription for antibiotics, which you can fill if you need it. ¨ Talk to your doctor if you have any problems with your medicine.  And call your doctor if you have to use your antibiotic or steroid pills.  Preventing an exacerbation  · Do not smoke. This is the most important step you can take to prevent more damage to your lungs and prevent problems. If you already smoke, it is never too late to stop. If you need help quitting, talk to your doctor about stop-smoking programs and medicines. These can increase your chances of quitting for good. · Take your daily medicines as prescribed. · Avoid colds and flu. ¨ Get a pneumococcal vaccine. ¨ Get a flu vaccine each year, as soon as it is available. Ask those you live or work with to do the same, so they will not get the flu and infect you. ¨ Try to stay away from people with colds or the flu. ¨ Wash your hands often. · Avoid secondhand smoke; air pollution; cold, dry air; hot, humid air; and high altitudes. Stay at home with your windows closed when air pollution is bad. · Learn breathing techniques for COPD, such as breathing through pursed lips. These techniques can help you breathe easier during an exacerbation. When should you call for help? Call 911 anytime you think you may need emergency care. For example, call if:  ? · You have severe trouble breathing. ? · You have severe chest pain. ?Call your doctor now or seek immediate medical care if:  ? · You have new or worse shortness of breath. ? · You develop new chest pain. ? · You are coughing more deeply or more often, especially if you notice more mucus or a change in the color of your mucus. ? · You cough up blood. ? · You have new or increased swelling in your legs or belly. ? · You have a fever. ? Watch closely for changes in your health, and be sure to contact your doctor if:  ? · You need to use your antibiotic or steroid pills. ? · Your symptoms are getting worse. Where can you learn more? Go to http://valentina-elvis.info/. Enter X643 in the search box to learn more about \"COPD Exacerbation Plan: Care Instructions. \"  Current as of:  May 12, 2017  Content Version: 11.4  © 7883-1926 [employer]. Care instructions adapted under license by [employer] (which disclaims liability or warranty for this information). If you have questions about a medical condition or this instruction, always ask your healthcare professional. [address] any warranty or liability for your use of this information.

## 2018-01-24 NOTE — PROGRESS NOTES
1. Have you been to the ER, urgent care clinic since your last visit? Hospitalized since your last visit? No    2. Have you seen or consulted any other health care providers outside of the [address] [place] [address] since your last visit? Include any pap smears or colon screening.  No  Reviewed record in preparation for visit and have necessary documentation  Pt did not bring medication to office visit for review  Information was given to pt on Advanced Directives, Living Will  Information was given on Shingles Vaccine  opportunity was given for questions  Goals that were addressed and/or need to be completed during or after this appointment include     Reviewed record in preparation for visit and have necessary documentation  Pt did not bring medication to office visit for review    Goals that were addressed and/or need to be completed during or after this appointment include   Health Maintenance Due   Topic Date Due    DTaP/Tdap/Td series (1 - Tdap) 07/15/1976    ZOSTER VACCINE AGE 60>  05/15/2015    BREAST CANCER SCRN MAMMOGRAM  07/24/2015    EYE EXAM RETINAL OR DILATED Q1  08/06/2016    MICROALBUMIN Q1  03/30/2017    LIPID PANEL Q1  03/30/2017    Influenza Age 9 to Adult  08/01/2017    PAP AKA CERVICAL CYTOLOGY  10/22/2017    FOOT EXAM Q1  10/24/2017    HEMOGLOBIN A1C Q6M  12/12/2017

## 2018-01-25 LAB
ALBUMIN SERPL-MCNC: 4.7 G/DL (ref 3.6–4.8)
ALBUMIN/GLOB SERPL: 2 {RATIO} (ref 1.2–2.2)
ALP SERPL-CCNC: 62 IU/L (ref 39–117)
ALT SERPL-CCNC: 13 IU/L (ref 0–32)
AST SERPL-CCNC: 19 IU/L (ref 0–40)
BASOPHILS # BLD AUTO: 0.1 X10E3/UL (ref 0–0.2)
BASOPHILS NFR BLD AUTO: 1 %
BILIRUB SERPL-MCNC: 0.5 MG/DL (ref 0–1.2)
BUN SERPL-MCNC: 12 MG/DL (ref 8–27)
BUN/CREAT SERPL: 19 (ref 12–28)
CALCIUM SERPL-MCNC: 9.7 MG/DL (ref 8.7–10.3)
CHLORIDE SERPL-SCNC: 101 MMOL/L (ref 96–106)
CHOLEST SERPL-MCNC: 210 MG/DL (ref 100–199)
CO2 SERPL-SCNC: 27 MMOL/L (ref 18–29)
CREAT SERPL-MCNC: 0.63 MG/DL (ref 0.57–1)
EOSINOPHIL # BLD AUTO: 0.1 X10E3/UL (ref 0–0.4)
EOSINOPHIL NFR BLD AUTO: 1 %
ERYTHROCYTE [DISTWIDTH] IN BLOOD BY AUTOMATED COUNT: 13.3 % (ref 12.3–15.4)
EST. AVERAGE GLUCOSE BLD GHB EST-MCNC: 105 MG/DL
GFR SERPLBLD CREATININE-BSD FMLA CKD-EPI: 111 ML/MIN/1.73
GFR SERPLBLD CREATININE-BSD FMLA CKD-EPI: 96 ML/MIN/1.73
GLOBULIN SER CALC-MCNC: 2.3 G/DL (ref 1.5–4.5)
GLUCOSE SERPL-MCNC: 106 MG/DL (ref 65–99)
HBA1C MFR BLD: 5.3 % (ref 4.8–5.6)
HCT VFR BLD AUTO: 44.8 % (ref 34–46.6)
HDLC SERPL-MCNC: 98 MG/DL
HGB BLD-MCNC: 15.3 G/DL (ref 11.1–15.9)
IMM GRANULOCYTES # BLD: 0 X10E3/UL (ref 0–0.1)
IMM GRANULOCYTES NFR BLD: 0 %
LDLC SERPL CALC-MCNC: 99 MG/DL (ref 0–99)
LYMPHOCYTES # BLD AUTO: 1.2 X10E3/UL (ref 0.7–3.1)
LYMPHOCYTES NFR BLD AUTO: 17 %
MAGNESIUM SERPL-MCNC: 2.2 MG/DL (ref 1.6–2.3)
MCH RBC QN AUTO: 34.2 PG (ref 26.6–33)
MCHC RBC AUTO-ENTMCNC: 34.2 G/DL (ref 31.5–35.7)
MCV RBC AUTO: 100 FL (ref 79–97)
MONOCYTES # BLD AUTO: 0.7 X10E3/UL (ref 0.1–0.9)
MONOCYTES NFR BLD AUTO: 9 %
NEUTROPHILS # BLD AUTO: 5 X10E3/UL (ref 1.4–7)
NEUTROPHILS NFR BLD AUTO: 72 %
PLATELET # BLD AUTO: 208 X10E3/UL (ref 150–379)
POTASSIUM SERPL-SCNC: 4.9 MMOL/L (ref 3.5–5.2)
PROT SERPL-MCNC: 7 G/DL (ref 6–8.5)
RBC # BLD AUTO: 4.47 X10E6/UL (ref 3.77–5.28)
SODIUM SERPL-SCNC: 144 MMOL/L (ref 134–144)
TRIGL SERPL-MCNC: 63 MG/DL (ref 0–149)
TSH SERPL DL<=0.005 MIU/L-ACNC: 0.54 UIU/ML (ref 0.45–4.5)
VLDLC SERPL CALC-MCNC: 13 MG/DL (ref 5–40)
WBC # BLD AUTO: 7 X10E3/UL (ref 3.4–10.8)

## 2018-01-25 RX ORDER — IPRATROPIUM BROMIDE AND ALBUTEROL SULFATE 2.5; .5 MG/3ML; MG/3ML
3 SOLUTION RESPIRATORY (INHALATION)
Qty: 180 ML | Refills: 3 | Status: SHIPPED | OUTPATIENT
Start: 2018-01-25 | End: 2018-01-25 | Stop reason: SDUPTHER

## 2018-01-29 NOTE — PROGRESS NOTES
Progress Note    Patient: Ward Bateman MRN: [id number]  SSN: [id number]    YOB: 1955  Age: 58 y.o. Sex: female        Subjective:     Chief Complaint   Patient presents with    COPD       HPI: she is a 58y.o. year old female new to me who presents for follow up of COPD. She continues to smoke and experience SOB. Patient using combivent multiple times daily. Has been prescribed maintenance inhalers which she says do not work. Patient denies HA, dizziness, CP, abdominal pain, dysuria, acute myalgias or arthralgias. She is underweight with decreased weight today. She says appetite is good. Encounter Diagnoses   Name Primary?  COPD exacerbation ([facility] [facility] Yes    Other emphysema ([facility] [facility]     [facility]     Weight loss     Tobacco abuse        BP Readings from Last 3 Encounters:   01/24/18 153/86   08/31/17 137/71   06/12/17 152/82     Wt Readings from Last 3 Encounters:   01/24/18 88 lb 12.8 oz (40.3 kg)   08/31/17 94 lb 9.6 oz (42.9 kg)   06/12/17 95 lb (43.1 kg)     Body mass index is 14.33 kg/(m^2).     Lab Results   Component Value Date/Time    WBC 7.0 01/24/2018 04:37 PM    HGB 15.3 01/24/2018 04:37 PM    HCT 44.8 01/24/2018 04:37 PM    PLATELET 464 87/85/6419 04:37 PM     01/24/2018 04:37 PM     Lab Results   Component Value Date/Time    TSH 0.536 01/24/2018 04:37 PM    T4, Free 1.47 03/30/2016 02:53 PM      Lab Results   Component Value Date/Time    Sodium 144 01/24/2018 04:37 PM    Potassium 4.9 01/24/2018 04:37 PM    Chloride 101 01/24/2018 04:37 PM    CO2 27 01/24/2018 04:37 PM    Anion gap 9 02/18/2014 04:11 PM    Glucose 106 01/24/2018 04:37 PM    BUN 12 01/24/2018 04:37 PM    Creatinine 0.63 01/24/2018 04:37 PM    BUN/Creatinine ratio 19 01/24/2018 04:37 PM    GFR est  01/24/2018 04:37 PM    GFR est non-AA 96 01/24/2018 04:37 PM    Calcium 9.7 01/24/2018 04:37 PM    Bilirubin, total 0.5 01/24/2018 04:37 PM    ALT (SGPT) 13 01/24/2018 04:37 PM    AST (SGOT) 19 01/24/2018 04:37 PM    Alk. phosphatase 62 01/24/2018 04:37 PM    Protein, total 7.0 01/24/2018 04:37 PM    Albumin 4.7 01/24/2018 04:37 PM    Globulin 2.8 10/27/2010 02:43 PM    A-G Ratio 2.0 01/24/2018 04:37 PM      Lab Results   Component Value Date/Time    Hemoglobin A1c 5.3 01/24/2018 04:37 PM    Hemoglobin A1c (POC) 5.2 11/13/2014 12:47 PM          Current and past medical information:    Current Medications after this visit[de-identified]     Current Outpatient Prescriptions   Medication Sig    azithromycin (ZITHROMAX) 250 mg tablet Take 2 tablets today, then take 1 tablet daily    predniSONE (DELTASONE) 20 mg tablet Take 1 Tab by mouth two (2) times a day for 5 days.  zafirlukast (ACCOLATE) 10 mg tablet TAKE 1 TABLET BY MOUTH DAILY WITH LUNCH    ibandronate (BONIVA) 150 mg tablet Take 150 mg by mouth every thirty (30) days.  ipratropium-albuterol (COMBIVENT RESPIMAT)  mcg/actuation inhaler USE 1 PUFF BY MOUTH EVERY 6 HOURS    cyclobenzaprine (FLEXERIL) 10 mg tablet Take 1 Tab by mouth three (3) times daily as needed for Muscle Spasm(s).  oxyCODONE-acetaminophen (PERCOCET 10)  mg per tablet Take 1 Tab by mouth.  Lancets misc One Touch Lancets-Test blood sugar  daily for DM-2 (250.00)    glucose blood VI test strips (ONE TOUCH ULTRA TEST) strip Test blood sugar  daily for DM-2 (250.00)     No current facility-administered medications for this visit.         Patient Active Problem List    Diagnosis Date Noted    Back muscle spasm 06/28/2016    Depression 09/19/2014    Anorexia 09/18/2014    Diabetes mellitus type 2, controlled ([facility] 06/22/2012    Back pain 07/15/2011    Anxiety 02/09/2011    Tobacco abuse 12/06/2010    Asthma 12/06/2010    COPD (chronic obstructive pulmonary disease) ([facility] 12/06/2010    Hyperlipemia 06/16/2010    Neuropathy     Fibromyalgia     MS (multiple sclerosis) ([facility])        Past Medical History:   Diagnosis Date    Anxiety     Asthma 12/6/2010    COPD (chronic obstructive pulmonary disease) ([facility] 12/6/2010    Depression 9/19/2014    Diabetes ([facility]     Fibromyalgia     Hyperlipemia 6/16/2010    MS (multiple sclerosis) ([facility]     Neuropathy     Syncope        Allergies   Allergen Reactions    Evista [Raloxifene] Diarrhea and Swelling     Makes throat swell     Doxycycline Swelling     Tongue      Pcn [Penicillins] Swelling     Of the tongue    Spiriva With Handihaler [Tiotropium Bromide] Other (comments)     Chest pain        Past Surgical History:   Procedure Laterality Date    HX APPENDECTOMY         Social History     Social History    Marital status:      Spouse name: N/A    Number of children: N/A    Years of education: N/A     Social History Main Topics    Smoking status: Current Some Day Smoker     Packs/day: 0.50     Years: 40.00    Smokeless tobacco: Never Used      Comment: states has been trying to stop-given stop smoking information    Alcohol use No    Drug use: No    Sexual activity: Not Asked     Other Topics Concern    None     Social History Narrative         Objective:     Review of Systems:  Constitutional: Negative for fatigue or malaise  Derm: Negative for rash or lesion  HEENT: Negative for acute hearing or vision changes  Cardiovascular: Negative for dizziness, chest pain or palpitations  Respiratory: Negative for cough, wheezing or SOB  Gastreintestinal: Negative for nausea or abdominal pain  Genital/urinary: Negative for dysuria or voiding dysfunction  Muscoloskeletal: Negative for myalgias or arthralgias   Neurological: Negative for headache, weakness or paresthesia  Psychological: Negative for depression or anxiety      Vitals:    01/24/18 1525   BP: 153/86   Pulse: 84   Resp: 18   Temp: 97 °F (36.1 °C)   TempSrc: Oral   SpO2: 94%   Weight: 88 lb 12.8 oz (40.3 kg)   Height: 5' 6\" (1.676 m)      Body mass index is 14.33 kg/(m^2).     Physical Exam:  Constitutional: thin, in no acute distress  Skin: warm and dry, normal tone and turgor  Head: normocephalic, atraumatic  Eyes: sclera clear, EOMI, PERRL  Neck: normal range of motion  CV: normal S1, S2, regular rate and rhythm  Respiratory: clear to auscultation bilaterally with symmetrical effort  Extremities: full range of motion  Neurology: normal strength and sensation  Psych: active, alert and oriented, affect appropriate     Health Maintenance Due   Topic Date Due    DTaP/Tdap/Td series (1 - Tdap) 07/15/1976    ZOSTER VACCINE AGE 60>  05/15/2015    BREAST CANCER SCRN MAMMOGRAM  07/24/2015    EYE EXAM RETINAL OR DILATED Q1  08/06/2016    MICROALBUMIN Q1  03/30/2017    Influenza Age 9 to Adult  08/01/2017    PAP AKA CERVICAL CYTOLOGY  10/22/2017    FOOT EXAM Q1  10/24/2017       Risk, benefits of recommended health maintenance discussed. Patient expressed understanding and declined at this time. Assessment and orders:     Diagnoses and all orders for this visit:    1. COPD exacerbation (HCC)  -     XR CHEST PA LAT; Future  -     ALBUTEROL IPRATROP NON-COMP  -     IA PRESSURIZED/NONPRESSURIZED INHALATION TREATMENT  -     azithromycin (ZITHROMAX) 250 mg tablet; Take 2 tablets today, then take 1 tablet daily  -     predniSONE (DELTASONE) 20 mg tablet; Take 1 Tab by mouth two (2) times a day for 5 days. 2. Other emphysema ([address]  -     CBC WITH AUTOMATED DIFF    3. Diabetes mellitus type 2, diet-controlled ([address]  -     HEMOGLOBIN A1C WITH EAG  -     TSH 3RD GENERATION  -     CBC WITH AUTOMATED DIFF  -     METABOLIC PANEL, COMPREHENSIVE  -     LIPID PANEL  -     MAGNESIUM    4. Weight loss  -     TSH 3RD GENERATION  -     METABOLIC PANEL, COMPREHENSIVE    5. Tobacco abuse        Plan of care:  Diagnoses were discussed in detail with patient. Strongly advised patient to stop all use of tobacco products. Medication risks/benefits/side effects discussed with patient. All of the patient's questions were addressed.  The patient understands and agrees with our plan of care. The patient knows to call back if they are unsure of or forgets any changes we discussed today or if the symptoms change. The patient received an After-Visit Summary which contains VS, orders, allergy and medication lists. Patient Care Team:  Audrey Givens MD as PCP - General (Family Practice)  Becka Garcia MD as Physician (Gastroenterology)    Follow-up Disposition:  Return in about 5 weeks (around 2/28/2018), or if symptoms worsen or fail to improve.     Future Appointments  Date Time Provider Federico Sales   2/28/2018 1:00 PM Shikha Haile MD [facility]       Signed By: Shikha Haile MD     January 29, 2018

## 2018-03-09 ENCOUNTER — OFFICE VISIT (OUTPATIENT)
Dept: FAMILY MEDICINE CLINIC | Age: 63
End: 2018-03-09

## 2018-03-09 VITALS
HEIGHT: 66 IN | DIASTOLIC BLOOD PRESSURE: 70 MMHG | SYSTOLIC BLOOD PRESSURE: 138 MMHG | RESPIRATION RATE: 18 BRPM | WEIGHT: 86.6 LBS | TEMPERATURE: 97 F | HEART RATE: 81 BPM | OXYGEN SATURATION: 96 % | BODY MASS INDEX: 13.92 KG/M2

## 2018-03-09 DIAGNOSIS — R63.6 SEVERELY UNDERWEIGHT ADULT: ICD-10-CM

## 2018-03-09 DIAGNOSIS — R63.4 ABNORMAL WEIGHT LOSS: ICD-10-CM

## 2018-03-09 DIAGNOSIS — G35 H/O MULTIPLE SCLEROSIS (HCC): ICD-10-CM

## 2018-03-09 DIAGNOSIS — F17.210 SMOKING GREATER THAN 30 PACK YEARS: ICD-10-CM

## 2018-03-09 DIAGNOSIS — J43.8 OTHER EMPHYSEMA (HCC): Primary | ICD-10-CM

## 2018-03-09 DIAGNOSIS — R73.01 IFG (IMPAIRED FASTING GLUCOSE): ICD-10-CM

## 2018-03-09 DIAGNOSIS — E78.00 PURE HYPERCHOLESTEROLEMIA: ICD-10-CM

## 2018-03-09 LAB
ALBUMIN UR QL STRIP: 10 MG/L
BILIRUB UR QL STRIP: NEGATIVE
CREATININE, URINE POC: 300 MG/DL
GLUCOSE UR-MCNC: NEGATIVE MG/DL
KETONES P FAST UR STRIP-MCNC: NEGATIVE MG/DL
MICROALBUMIN/CREAT RATIO POC: <30 MG/G
PH UR STRIP: 5 [PH] (ref 4.6–8)
PROT UR QL STRIP: NEGATIVE
SP GR UR STRIP: 1.01 (ref 1–1.03)
UA UROBILINOGEN AMB POC: NORMAL (ref 0.2–1)
URINALYSIS CLARITY POC: CLEAR
URINALYSIS COLOR POC: YELLOW
URINE BLOOD POC: NORMAL
URINE LEUKOCYTES POC: NEGATIVE
URINE NITRITES POC: NEGATIVE

## 2018-03-09 NOTE — PROGRESS NOTES
Progress Note    Patient: Ramiro Newell MRN: [id number]  SSN: [id number]    YOB: 1955  Age: 58 y.o. Sex: female        Subjective:     Chief Complaint   Patient presents with    Diabetes    Weight Loss    Medication Refill       HPI: she is a 58y.o. year old female new to me who presents for follow up. She continues to smoke and experience SOB. Patient using combivent multiple times daily. Has been prescribed maintenance inhalers. However she does not use these as she says they do not work. Patient denies HA, dizziness, CP, abdominal pain, dysuria, acute myalgias or arthralgias. She is underweight with continued weight loss. She is past due for PAP and mammogram. She declines mammogram and says she was told she does not need another PAP. She has been referred for colonoscopy several times over the past 5 years. She has not had this done yet. Patient says appetite is good. She was diagnosed with DM2 in the past. She is not on medication and says she was misdiagnosed in [place]. Also with hx of MS. She has not seen a neurologist in several years. Prior medical records not available in EHR. She is followed by pain management and is on opioid medication. Wt Readings from Last 3 Encounters:   03/09/18 86 lb 9.6 oz (39.3 kg)   01/24/18 88 lb 12.8 oz (40.3 kg)   08/31/17 94 lb 9.6 oz (42.9 kg)     Body mass index is 13.98 kg/(m^2). BP Readings from Last 3 Encounters:   03/09/18 138/70   01/24/18 153/86   08/31/17 137/71       Encounter Diagnoses   Name Primary?     Other emphysema ([facility] [facility] Yes    Severely underweight adult     Abnormal weight loss      Smoking greater than 30 pack years     Pure hypercholesterolemia     IFG (impaired fasting glucose)     H/O multiple sclerosis        BP Readings from Last 3 Encounters:   03/09/18 138/70   01/24/18 153/86   08/31/17 137/71     Wt Readings from Last 3 Encounters:   03/09/18 86 lb 9.6 oz (39.3 kg)   01/24/18 88 lb 12.8 oz (40.3 kg)   08/31/17 94 lb 9.6 oz (42.9 kg)     Body mass index is 13.98 kg/(m^2). Current and past medical information:    Current Medications after this visit[de-identified]     Current Outpatient Prescriptions   Medication Sig    ipratropium-albuterol (COMBIVENT RESPIMAT)  mcg/actuation inhaler USE 1 PUFF BY MOUTH EVERY 6 HOURS    zafirlukast (ACCOLATE) 10 mg tablet TAKE 1 TABLET BY MOUTH DAILY WITH LUNCH    ibandronate (BONIVA) 150 mg tablet Take 150 mg by mouth every thirty (30) days.  cyclobenzaprine (FLEXERIL) 10 mg tablet Take 1 Tab by mouth three (3) times daily as needed for Muscle Spasm(s).  oxyCODONE-acetaminophen (PERCOCET 10)  mg per tablet Take 1 Tab by mouth.  Lancets misc One Touch Lancets-Test blood sugar  daily for DM-2 (250.00)    glucose blood VI test strips (ONE TOUCH ULTRA TEST) strip Test blood sugar  daily for DM-2 (250.00)     No current facility-administered medications for this visit.         Patient Active Problem List    Diagnosis Date Noted    Back muscle spasm 06/28/2016    Depression 09/19/2014    Anorexia 09/18/2014    Diabetes mellitus type 2, controlled ([facility] [facility] 06/22/2012    Back pain 07/15/2011    Anxiety 02/09/2011    Tobacco abuse 12/06/2010    Asthma 12/06/2010    COPD (chronic obstructive pulmonary disease) ([facility] [facility] 12/06/2010    Hyperlipemia 06/16/2010    Neuropathy     Fibromyalgia     MS (multiple sclerosis) ([facility] [facility]        Past Medical History:   Diagnosis Date    Anxiety     Asthma 12/6/2010    COPD (chronic obstructive pulmonary disease) ([facility] [facility] 12/6/2010    Depression 9/19/2014    Diabetes ([facility] [facility]     Fibromyalgia     Hyperlipemia 6/16/2010    MS (multiple sclerosis) (HCC)     Neuropathy     Syncope        Allergies   Allergen Reactions    Evista [Raloxifene] Diarrhea and Swelling     Makes throat swell     Doxycycline Swelling     Tongue      Pcn [Penicillins] Swelling     Of the tongue    Spiriva With Handihaler [Tiotropium Bromide] Other (comments)     Chest pain        Past Surgical History:   Procedure Laterality Date    HX APPENDECTOMY         Social History     Social History    Marital status:      Spouse name: N/A    Number of children: N/A    Years of education: N/A     Social History Main Topics    Smoking status: Current Some Day Smoker     Packs/day: 0.50     Years: 40.00    Smokeless tobacco: Never Used      Comment: states has been trying to stop-given stop smoking information    Alcohol use No    Drug use: No    Sexual activity: Not Asked     Other Topics Concern    None     Social History Narrative         Objective:     Review of Systems:  Constitutional: Negative for fatigue or malaise  Derm: Negative for rash or lesion  HEENT: Negative for acute hearing or vision changes  Cardiovascular: Negative for dizziness, chest pain or palpitations  Respiratory: Negative for cough, wheezing or SOB  Gastreintestinal: Negative for nausea or abdominal pain  Genital/urinary: Negative for dysuria or voiding dysfunction  Muscoloskeletal: Negative for myalgias or arthralgias   Neurological: Negative for headache, weakness or paresthesia  Psychological: Negative for depression or anxiety      Vitals:    03/09/18 0850   BP: 138/70   Pulse: 81   Resp: 18   Temp: 97 °F (36.1 °C)   TempSrc: Oral   SpO2: 96%   Weight: 86 lb 9.6 oz (39.3 kg)   Height: 5' 6\" (1.676 m)      Body mass index is 13.98 kg/(m^2). Physical Exam:  Constitutional: thin, in no acute distress  Skin: warm and dry, normal tone and turgor  Head: normocephalic, atraumatic  Eyes: sclera clear, EOMI, PERRL  Neck: normal range of motion  CV: normal S1, S2, regular rate and rhythm  Respiratory: clear to auscultation bilaterally with symmetrical effort  Extremities: full range of motion  Neurology: normal strength and sensation  Psych: active, alert and oriented, affect appropriate       Assessment and orders:     Diagnoses and all orders for this visit:    1.  Other emphysema ([address]  - ipratropium-albuterol (COMBIVENT RESPIMAT)  mcg/actuation inhaler; USE 1 PUFF BY MOUTH EVERY 6 HOURS  -     CBC WITH AUTOMATED DIFF    2. Severely underweight adult  -     CT LOW DOSE LUNG CANCER SCREENING; Future  -     OCCULT BLOOD, IMMUNOASSAY (FIT)  -     MAGNESIUM  -     METABOLIC PANEL, COMPREHENSIVE  -     TSH 3RD GENERATION  -     T4, FREE  -     CBC WITH AUTOMATED DIFF    3. Abnormal weight loss   -     TSH 3RD GENERATION  -     T4, FREE    4. Smoking greater than 30 pack years  -     CT LOW DOSE LUNG CANCER SCREENING; Future    5. Pure hypercholesterolemia  -     LIPID PANEL  -     METABOLIC PANEL, COMPREHENSIVE    6. IFG (impaired fasting glucose)  -     METABOLIC PANEL, COMPREHENSIVE  -     HEMOGLOBIN A1C WITH EAG  -     AMB POC URINALYSIS DIP STICK AUTO W/O MICRO  -     AMB POC URINE, MICROALBUMIN, SEMIQUANT (3 RESULTS)    7. H/O multiple sclerosis  -     REFERRAL TO NEUROLOGY        Plan of care:  Diagnoses were discussed in detail with patient. Discussed concern about cancer and need for all available screenings. Advised to make well woman appointment. Strongly advised patient to stop all use of tobacco products. Medication risks/benefits/side effects discussed with patient. All of the patient's questions were addressed. The patient understands and agrees with our plan of care. The patient knows to call back if they are unsure of or forgets any changes we discussed today or if the symptoms change. The patient received an After-Visit Summary which contains VS, orders, allergy and medication lists. Over half of the 40 minutes face to face with King Dias consisted of counseling and discussing treatment plans in reference to her continued weight loss, cancer screening and tobacco cessation.     Patient Care Team:  Kin Rabago MD as PCP - General (Family Practice)  Shirlene Cox MD as Physician (Gastroenterology)    Follow-up Disposition:  Return in about 3 months (around 6/9/2018), or if symptoms worsen or fail to improve.     Future Appointments  Date Time Provider Department Center   6/11/2018 9:00 AM Michael Musa MD [facility] SCHED       Signed By: Puja Olmos MD     March 9, 2018

## 2018-03-09 NOTE — PATIENT INSTRUCTIONS

## 2018-03-09 NOTE — MR AVS SNAPSHOT
[address] [address] 
[phone] Patient: Linda Huynh MRN: [id number] QPH:8/12/7113 Visit Information Date & Time Provider Department Dept. Phone Encounter #  
 3/9/2018  9:00 AM Radha Mendez, [address] [place] [address] [id number] Follow-up Instructions Return in about 3 months (around 6/9/2018), or if symptoms worsen or fail to improve. Upcoming Health Maintenance Date Due DTaP/Tdap/Td series (1 - Tdap) 7/15/1976 ZOSTER VACCINE AGE 60> 5/15/2015 BREAST CANCER SCRN MAMMOGRAM 7/24/2015 EYE EXAM RETINAL OR DILATED Q1 8/6/2016 MICROALBUMIN Q1 3/30/2017 Influenza Age 5 to Adult 8/1/2017 PAP AKA CERVICAL CYTOLOGY 10/22/2017 FOOT EXAM Q1 10/24/2017 HEMOGLOBIN A1C Q6M 7/24/2018 LIPID PANEL Q1 1/24/2019 Allergies as of 3/9/2018  Review Complete On: 3/9/2018 By: Radha Mendez MD  
  
 Severity Noted Reaction Type Reactions Evista [Raloxifene] Medium 10/02/2013   Side Effect Diarrhea, Swelling Makes throat swell Doxycycline  06/11/2010    Swelling Tongue Pcn [Penicillins]  06/05/2010    Swelling Of the tongue Spiriva With Handihaler [Tiotropium Bromide]  06/11/2010    Other (comments) Chest pain Current Immunizations  Reviewed on 8/31/2017 Name Date H1N1 FLU VACCINE 1/25/2010 Influenza Vaccine 10/19/2015, 10/2/2013 Influenza Vaccine (Quad) PF 10/4/2016 Influenza Vaccine Split 10/31/2012, 10/12/2011,  Deferred (Patient Refused) Influenza Vaccine Whole 1/25/2010 Pneumococcal Conjugate (PCV-13) 10/24/2016 Pneumococcal Polysaccharide (PPSV-23) 3/30/2016, 3/16/2009 Not reviewed this visit You Were Diagnosed With   
  
 Codes Comments Other emphysema ([facility]    -  Primary ICD-10-CM: J43.8 ICD-9-CM: 492.8 Severely underweight adult     ICD-10-CM: R63.6 ICD-9-CM: 783.22   
 Abnormal weight loss     ICD-10-CM: R63.4 ICD-9-CM: 783.21 Smoking greater than 30 pack years     ICD-10-CM: F17.210 ICD-9-CM: 305.1 Pure hypercholesterolemia     ICD-10-CM: E78.00 ICD-9-CM: 272.0 IFG (impaired fasting glucose)     ICD-10-CM: R73.01 
ICD-9-CM: 790.21   
 H/O multiple sclerosis     ICD-10-CM: Z86.69 
ICD-9-CM: V12.49 Vitals BP Pulse Temp Resp Height(growth percentile) Weight(growth percentile) 138/70 (BP 1 Location: Left arm, BP Patient Position: Sitting) 81 97 °F (36.1 °C) (Oral) 18 5' 6\" (1.676 m) 86 lb 9.6 oz (39.3 kg) SpO2 BMI OB Status Smoking Status 96% 13.98 kg/m2 Postmenopausal Current Some Day Smoker Vitals History BMI and BSA Data Body Mass Index Body Surface Area 13.98 kg/m 2 1.35 m 2 Preferred Pharmacy Pharmacy Name Phone [address] [place] [address] [phone] Your Updated Medication List  
  
   
This list is accurate as of 3/9/18  9:48 AM.  Always use your most recent med list.  
  
  
  
  
 cyclobenzaprine 10 mg tablet Commonly known as:  FLEXERIL Take 1 Tab by mouth three (3) times daily as needed for Muscle Spasm(s). glucose blood VI test strips strip Commonly known as:  ONETOUCH ULTRA TEST Test blood sugar  daily for DM-2 (250.00) ibandronate 150 mg tablet Commonly known as:  Henrik [place] Take 150 mg by mouth every thirty (30) days. ipratropium-albuterol  mcg/actuation inhaler Commonly known as:  COMBIVENT RESPIMAT  
USE 1 PUFF BY MOUTH EVERY 6 HOURS Lancets Misc One Touch Lancets-Test blood sugar  daily for DM-2 (250.00)  
  
 oxyCODONE-acetaminophen  mg per tablet Commonly known as:  PERCOCET 10 Take 1 Tab by mouth. zafirlukast 10 mg tablet Commonly known as:  ACCOLATE  
TAKE 1 TABLET BY MOUTH DAILY WITH LUNCH Prescriptions Sent to Pharmacy Refills ipratropium-albuterol (COMBIVENT RESPIMAT)  mcg/actuation inhaler 3 Sig: USE 1 PUFF BY MOUTH EVERY 6 HOURS Class: Normal  
 Pharmacy: [address],  [place] Ph #: [phone] We Performed the Following AMB POC URINALYSIS DIP STICK AUTO W/O MICRO [18312 CPT(R)] AMB POC URINE, MICROALBUMIN, SEMIQUANT (3 RESULTS) [66167 CPT(R)] CBC WITH AUTOMATED DIFF [22152 CPT(R)] HEMOGLOBIN A1C WITH EAG [37235 CPT(R)] LIPID PANEL [26725 CPT(R)] MAGNESIUM [id number] CPT(R)] METABOLIC PANEL, COMPREHENSIVE [73802 CPT(R)] OCCULT BLOOD, IMMUNOASSAY (FIT) [id number] CPT(R)] REFERRAL TO NEUROLOGY [FVG25 Custom] Comments:  
 Please evaluate patient with prior dx of MS. Lynn T4, FREE [id number] CPT(R)] TSH 3RD GENERATION [71713 CPT(R)] Follow-up Instructions Return in about 3 months (around 6/9/2018), or if symptoms worsen or fail to improve. To-Do List   
 03/09/2018 Imaging:  CT LOW DOSE LUNG CANCER SCREENING Referral Information Referral ID Referred By Referred To  
  
 [id number] DAVID HUMPHREY MD   
   [address] [facility] [address] [facility], [address] [place] [address] Phone: [phone] Fax: [phone] Visits Status Start Date End Date 1 New Request 3/9/18 3/9/19 If your referral has a status of pending review or denied, additional information will be sent to support the outcome of this decision. Introducing [address]! [facility] introduces [employer]arnest patient portal. Now you can access parts of your medical record, email your doctor's office, and request medication refills online. 1. In your internet browser, go to [email or web address] 2. Click on the First Time User? Click Here link in the Sign In box. You will see the New Member Sign Up page. 3. Enter your Elecs[employer] Access Code exactly as it appears below. You will not need to use this code after youve completed the sign-up process. If you do not sign up before the expiration date, you must request a new code. · Elecsnet Access Code: [email or web address] Expires: 4/24/2018  3:14 PM 
 
4. Enter the last four digits of your Social Security Number (xxxx) and Date of Birth (mm/dd/yyyy) as indicated and click Submit. You will be taken to the next sign-up page. 5. Create a EZ-Appst ID. This will be your Elecsnet login ID and cannot be changed, so think of one that is secure and easy to remember. 6. Create a Elecsnet password. You can change your password at any time. 7. Enter your Password Reset Question and Answer. This can be used at a later time if you forget your password. 8. Enter your e-mail address. You will receive e-mail notification when new information is available in [address] 9. Click Sign Up. You can now view and download portions of your medical record. 10. Click the Download Summary menu link to download a portable copy of your medical information. If you have questions, please visit the Frequently Asked Questions section of the Elecsnet website. Remember, Elecsnet is NOT to be used for urgent needs. For medical emergencies, dial 911. Now available from your iPhone and Android! Please provide this summary of care documentation to your next provider. Your primary care clinician is listed as [address]. If you have any questions after today's visit, please call [phone].

## 2018-03-10 LAB
ALBUMIN SERPL-MCNC: 5 G/DL (ref 3.6–4.8)
ALBUMIN/GLOB SERPL: 2.1 {RATIO} (ref 1.2–2.2)
ALP SERPL-CCNC: 68 IU/L (ref 39–117)
ALT SERPL-CCNC: 21 IU/L (ref 0–32)
AST SERPL-CCNC: 28 IU/L (ref 0–40)
BASOPHILS # BLD AUTO: 0 X10E3/UL (ref 0–0.2)
BASOPHILS NFR BLD AUTO: 0 %
BILIRUB SERPL-MCNC: 0.5 MG/DL (ref 0–1.2)
BUN SERPL-MCNC: 10 MG/DL (ref 8–27)
BUN/CREAT SERPL: 18 (ref 12–28)
CALCIUM SERPL-MCNC: 9.7 MG/DL (ref 8.7–10.3)
CHLORIDE SERPL-SCNC: 98 MMOL/L (ref 96–106)
CHOLEST SERPL-MCNC: 237 MG/DL (ref 100–199)
CO2 SERPL-SCNC: 27 MMOL/L (ref 18–29)
CREAT SERPL-MCNC: 0.57 MG/DL (ref 0.57–1)
EOSINOPHIL # BLD AUTO: 0.1 X10E3/UL (ref 0–0.4)
EOSINOPHIL NFR BLD AUTO: 1 %
ERYTHROCYTE [DISTWIDTH] IN BLOOD BY AUTOMATED COUNT: 13.2 % (ref 12.3–15.4)
EST. AVERAGE GLUCOSE BLD GHB EST-MCNC: 114 MG/DL
GFR SERPLBLD CREATININE-BSD FMLA CKD-EPI: 100 ML/MIN/1.73
GFR SERPLBLD CREATININE-BSD FMLA CKD-EPI: 115 ML/MIN/1.73
GLOBULIN SER CALC-MCNC: 2.4 G/DL (ref 1.5–4.5)
GLUCOSE SERPL-MCNC: 104 MG/DL (ref 65–99)
HBA1C MFR BLD: 5.6 % (ref 4.8–5.6)
HCT VFR BLD AUTO: 46.7 % (ref 34–46.6)
HDLC SERPL-MCNC: 104 MG/DL
HGB BLD-MCNC: 15.9 G/DL (ref 11.1–15.9)
IMM GRANULOCYTES # BLD: 0 X10E3/UL (ref 0–0.1)
IMM GRANULOCYTES NFR BLD: 0 %
LDLC SERPL CALC-MCNC: 115 MG/DL (ref 0–99)
LYMPHOCYTES # BLD AUTO: 0.9 X10E3/UL (ref 0.7–3.1)
LYMPHOCYTES NFR BLD AUTO: 11 %
MAGNESIUM SERPL-MCNC: 2 MG/DL (ref 1.6–2.3)
MCH RBC QN AUTO: 34.9 PG (ref 26.6–33)
MCHC RBC AUTO-ENTMCNC: 34 G/DL (ref 31.5–35.7)
MCV RBC AUTO: 102 FL (ref 79–97)
MONOCYTES # BLD AUTO: 0.6 X10E3/UL (ref 0.1–0.9)
MONOCYTES NFR BLD AUTO: 7 %
NEUTROPHILS # BLD AUTO: 6.5 X10E3/UL (ref 1.4–7)
NEUTROPHILS NFR BLD AUTO: 81 %
PLATELET # BLD AUTO: 224 X10E3/UL (ref 150–379)
POTASSIUM SERPL-SCNC: 4.6 MMOL/L (ref 3.5–5.2)
PROT SERPL-MCNC: 7.4 G/DL (ref 6–8.5)
RBC # BLD AUTO: 4.56 X10E6/UL (ref 3.77–5.28)
SODIUM SERPL-SCNC: 142 MMOL/L (ref 134–144)
T4 FREE SERPL-MCNC: 1.74 NG/DL (ref 0.82–1.77)
TRIGL SERPL-MCNC: 89 MG/DL (ref 0–149)
TSH SERPL DL<=0.005 MIU/L-ACNC: 0.69 UIU/ML (ref 0.45–4.5)
VLDLC SERPL CALC-MCNC: 18 MG/DL (ref 5–40)
WBC # BLD AUTO: 8.1 X10E3/UL (ref 3.4–10.8)

## 2018-03-13 ENCOUNTER — TELEPHONE (OUTPATIENT)
Dept: FAMILY MEDICINE CLINIC | Age: 63
End: 2018-03-13

## 2018-03-13 NOTE — TELEPHONE ENCOUNTER
----- Message from Johnna Funk sent at 3/13/2018 11:28 AM EDT -----  Regarding: Dr. Jessica Garrido is asking the doctor to schedule another appointment for a \"Lung X-Ray\", Colton Antunez stated she has to pay out of pocket because her insurance will not pay. Best Contact [phone].

## 2018-03-16 LAB — HEMOCCULT STL QL IA: NEGATIVE

## 2018-04-24 DIAGNOSIS — J43.8 OTHER EMPHYSEMA (HCC): ICD-10-CM

## 2018-04-24 RX ORDER — ZAFIRLUKAST 10 MG/1
TABLET, FILM COATED ORAL
Qty: 90 TAB | Refills: 1 | Status: SHIPPED | OUTPATIENT
Start: 2018-04-24 | End: 2018-12-14 | Stop reason: SDUPTHER

## 2018-05-08 ENCOUNTER — OFFICE VISIT (OUTPATIENT)
Dept: NEUROLOGY | Age: 63
End: 2018-05-08

## 2018-05-08 VITALS
WEIGHT: 85.8 LBS | DIASTOLIC BLOOD PRESSURE: 60 MMHG | HEART RATE: 82 BPM | BODY MASS INDEX: 13.85 KG/M2 | SYSTOLIC BLOOD PRESSURE: 131 MMHG

## 2018-05-08 DIAGNOSIS — R53.1 LEFT-SIDED WEAKNESS: Primary | ICD-10-CM

## 2018-05-08 NOTE — MR AVS SNAPSHOT
[address] [facility] [address] 
 
 
 [address] [address] 
[phone] Patient: Gloria Mccann MRN: [id number] OFO:0/50/8334 Visit Information Date & Time Provider Department Dept. Phone Encounter #  
 5/8/2018 12:30 PM Ivone Gamino MD Neurology [address] [facility],  [id number] [phone] [id number] Follow-up Instructions Return in about 1 month (around 6/8/2018). Your Appointments 6/11/2018  9:00 AM  
Complete Physical with Elvia Foster MD  
[address]) Appt Note: 3 mnth fu /[facility]/  
 [address] [address]  
[place] [address] [place] [phone] Upcoming Health Maintenance Date Due DTaP/Tdap/Td series (1 - Tdap) 7/15/1976 ZOSTER VACCINE AGE 60> 5/15/2015 BREAST CANCER SCRN MAMMOGRAM 7/24/2015 EYE EXAM RETINAL OR DILATED Q1 8/6/2016 PAP AKA CERVICAL CYTOLOGY 10/22/2017 FOOT EXAM Q1 10/24/2017 MEDICARE YEARLY EXAM 3/14/2018 Influenza Age 5 to Adult 8/1/2018 HEMOGLOBIN A1C Q6M 9/9/2018 MICROALBUMIN Q1 3/9/2019 LIPID PANEL Q1 3/9/2019 Allergies as of 5/8/2018  Review Complete On: 5/8/2018 By: Marquis Hernandez LPN Severity Noted Reaction Type Reactions Evista [Raloxifene] Medium 10/02/2013   Side Effect Diarrhea, Swelling Makes throat swell Doxycycline  06/11/2010    Swelling Tongue Pcn [Penicillins]  06/05/2010    Swelling Of the tongue Spiriva With Handihaler [Tiotropium Bromide]  06/11/2010    Other (comments) Chest pain Current Immunizations  Reviewed on 8/31/2017 Name Date H1N1 FLU VACCINE 1/25/2010 Influenza Vaccine 10/19/2015, 10/2/2013 Influenza Vaccine (Quad) PF 10/4/2016 Influenza Vaccine Split 10/31/2012, 10/12/2011,  Deferred (Patient Refused) Influenza Vaccine Whole 1/25/2010 Pneumococcal Conjugate (PCV-13) 10/24/2016 Pneumococcal Polysaccharide (PPSV-23) 3/30/2016, 3/16/2009 Not reviewed this visit You Were Diagnosed With   
  
 Codes Comments Left-sided weakness    -  Primary ICD-10-CM: R53.1 ICD-9-CM: 728.87 Vitals BP Pulse Weight(growth percentile) BMI OB Status Smoking Status 131/60 82 85 lb 12.8 oz (38.9 kg) 13.85 kg/m2 Postmenopausal Current Some Day Smoker BMI and BSA Data Body Mass Index Body Surface Area  
 13.85 kg/m 2 1.35 m 2 Preferred Pharmacy Pharmacy Name Phone [address],  [place] [phone] Your Updated Medication List  
  
   
This list is accurate as of 5/8/18  1:22 PM.  Always use your most recent med list.  
  
  
  
  
 cyclobenzaprine 10 mg tablet Commonly known as:  FLEXERIL Take 1 Tab by mouth three (3) times daily as needed for Muscle Spasm(s). glucose blood VI test strips strip Commonly known as:  ONETOUCH ULTRA TEST Test blood sugar  daily for DM-2 (250.00) ibandronate 150 mg tablet Commonly known as:  Sharl Goring Take 150 mg by mouth every thirty (30) days. ipratropium-albuterol  mcg/actuation inhaler Commonly known as:  COMBIVENT RESPIMAT  
USE 1 PUFF BY MOUTH EVERY 6 HOURS Lancets Misc One Touch Lancets-Test blood sugar  daily for DM-2 (250.00)  
  
 oxyCODONE-acetaminophen  mg per tablet Commonly known as:  PERCOCET 10 Take 1 Tab by mouth. zafirlukast 10 mg tablet Commonly known as:  ACCOLATE  
TAKE 1 TABLET BY MOUTH DAILY WITH LUNCH Follow-up Instructions Return in about 1 month (around 6/8/2018). To-Do List   
 05/08/2018 Imaging:  MRI BRAIN W WO CONT Patient Instructions PRESCRIPTION REFILL POLICY [facility] Statement to Patients April 1, 2014 In an effort to ensure the large volume of patient prescription refills is processed in the most efficient and expeditious manner, we are asking our patients to assist us by calling your Pharmacy for all prescription refills, this will include also your  Mail Order Pharmacy. The pharmacy will contact our office electronically to continue the refill process. Please do not wait until the last minute to call your pharmacy. We need at least 48 hours (2days) to fill prescriptions. We also encourage you to call your pharmacy before going to  your prescription to make sure it is ready. With regard to controlled substance prescription refill requests (narcotic refills) that need to be picked up at our office, we ask your cooperation by providing us with at least 72 hours (3days) notice that you will need a refill. We will not refill narcotic prescription refill requests after 4:00pm on any weekday, Monday through Thursday, or after 2:00pm on Fridays, or on the weekends. We encourage everyone to explore another way of getting your prescription refill request processed using [employer], our patient web portal through our electronic medical record system. [employer] is an efficient and effective way to communicate your medication request directly to the office and  downloadable as an pebbles on your smart phone . [employer] also features a review functionality that allows you to view your medication list as well as leave messages for your physician. Are you ready to get connected? If so please review the attatched instructions or speak to any of our staff to get you set up right away! Thank you so much for your cooperation. Should you have any questions please contact our Practice Administrator. The Physicians and Staff,  [facility] PRESCRIPTION REFILL POLICY [facility] Statement to Patients April 1, 2014 In an effort to ensure the large volume of patient prescription refills is processed in the most efficient and expeditious manner, we are asking our patients to assist us by calling your Pharmacy for all prescription refills, this will include also your  Mail Order Pharmacy. The pharmacy will contact our office electronically to continue the refill process. Please do not wait until the last minute to call your pharmacy. We need at least 48 hours (2days) to fill prescriptions. We also encourage you to call your pharmacy before going to  your prescription to make sure it is ready. With regard to controlled substance prescription refill requests (narcotic refills) that need to be picked up at our office, we ask your cooperation by providing us with at least 72 hours (3days) notice that you will need a refill. We will not refill narcotic prescription refill requests after 4:00pm on any weekday, Monday through Thursday, or after 2:00pm on Fridays, or on the weekends. We encourage everyone to explore another way of getting your prescription refill request processed using [employer], our patient web portal through our electronic medical record system. [employer] is an efficient and effective way to communicate your medication request directly to the office and  downloadable as an pebbles on your smart phone . [employer] also features a review functionality that allows you to view your medication list as well as leave messages for your physician. Are you ready to get connected? If so please review the attatched instructions or speak to any of our staff to get you set up right away! Thank you so much for your cooperation. Should you have any questions please contact our Practice Administrator. The Physicians and Staff,  [facility] Introducing [facility]! [employer] introduces [employer] patient portal. Now you can access parts of your medical record, email your doctor's office, and request medication refills online.    
 
1. In your internet browser, go to [email or web address] 2. Click on the First Time User? Click Here link in the Sign In box. You will see the New Member Sign Up page. 3. Enter your Comprehensive Care Access Code exactly as it appears below. You will not need to use this code after youve completed the sign-up process. If you do not sign up before the expiration date, you must request a new code. · Comprehensive Care Access Code: [email or web address] Expires: 8/6/2018 12:20 PM 
 
4. Enter the last four digits of your Social Security Number (xxxx) and Date of Birth (mm/dd/yyyy) as indicated and click Submit. You will be taken to the next sign-up page. 5. Create a [employer] Cycl[employer]t ID. This will be your Comprehensive Care login ID and cannot be changed, so think of one that is secure and easy to remember. 6. Create a Comprehensive Care password. You can change your password at any time. 7. Enter your Password Reset Question and Answer. This can be used at a later time if you forget your password. 8. Enter your e-mail address. You will receive e-mail notification when new information is available in [address] 9. Click Sign Up. You can now view and download portions of your medical record. 10. Click the Download Summary menu link to download a portable copy of your medical information. If you have questions, please visit the Frequently Asked Questions section of the Comprehensive Care website. Remember, Comprehensive Care is NOT to be used for urgent needs. For medical emergencies, dial 911. Now available from your iPhone and Android! Please provide this summary of care documentation to your next provider. Your primary care clinician is listed as Π[address] 90. If you have any questions after today's visit, please call [phone].

## 2018-05-08 NOTE — PROGRESS NOTES
NEUROLOGY NEW PATIENT OFFICE CONSULTATION      5/8/2018    RE: Nick Lynch         1955      REFERRED BY:  Rachel Cavazos MD        CHIEF COMPLAINT:  This is Nick Lynch is a 58 y.o. female ambidextrous on disability for lower back who comes in for L sided weakness and weight loss    HPI:     In 2008, while in [place], patient woke up with weakness of L side. Patient was admitted to hospital where MRI brain showed no stroke but a \"MS lesion\" in brain. For some reason was placed on Keppra (had a reaction)    Patient was seen by neurologist in [place], offered Luite Dante 87 medication but declined.     Patient eventually saw a neurologist at [place] [address] (Dr Katerina Rousseau) and had occipital nerve block with no clear benefit    (+) weight loss  Scared of spinal tap  No new weakness/ numbness  Eye said okay      ROS   (-) fever  (-) rash  All other systems reviewed and are negative    Past Medical Hx  Past Medical History:   Diagnosis Date    Anxiety     Asthma 12/6/2010    COPD (chronic obstructive pulmonary disease) ([facility] 12/6/2010    Depression 9/19/2014    Fibromyalgia     Hyperlipemia 6/16/2010    MS (multiple sclerosis) ([facility]     Neuropathy     Syncope    C4C6 surgery 2014    Social Hx  Social History     Social History    Marital status:      Spouse name: N/A    Number of children: N/A    Years of education: N/A     Social History Main Topics    Smoking status: Current Some Day Smoker     Packs/day: 0.50     Years: 40.00    Smokeless tobacco: Never Used      Comment: states has been trying to stop-given stop smoking information    Alcohol use No    Drug use: No    Sexual activity: Not Asked     Other Topics Concern    None     Social History Narrative       Family Hx  Family History   Problem Relation Age of Onset    Hypertension Father     Alcohol abuse Father     Kidney Disease Mother     Lung Disease Sister      COPD    Arthritis-rheumatoid Neg Hx     Asthma Neg Hx     Bleeding Prob Neg Hx     Cancer Neg Hx     Diabetes Neg Hx     Elevated Lipids Neg Hx     Headache Neg Hx     Heart Disease Neg Hx     Migraines Neg Hx     Psychiatric Disorder Neg Hx     Stroke Neg Hx     Mental Retardation Neg Hx     Anesth Problems Neg Hx        ALLERGIES  Allergies   Allergen Reactions    Evista [Raloxifene] Diarrhea and Swelling     Makes throat swell     Doxycycline Swelling     Tongue      Pcn [Penicillins] Swelling     Of the tongue    Spiriva With Handihaler [Tiotropium Bromide] Other (comments)     Chest pain        CURRENT MEDS  Current Outpatient Prescriptions   Medication Sig Dispense Refill    zafirlukast (ACCOLATE) 10 mg tablet TAKE 1 TABLET BY MOUTH DAILY WITH LUNCH 90 Tab 1    ipratropium-albuterol (COMBIVENT RESPIMAT)  mcg/actuation inhaler USE 1 PUFF BY MOUTH EVERY 6 HOURS 4 Each 3    ibandronate (BONIVA) 150 mg tablet Take 150 mg by mouth every thirty (30) days. 3 Tab 3    cyclobenzaprine (FLEXERIL) 10 mg tablet Take 1 Tab by mouth three (3) times daily as needed for Muscle Spasm(s). 20 Tab 1    oxyCODONE-acetaminophen (PERCOCET 10)  mg per tablet Take 1 Tab by mouth.  0    Lancets misc One Touch Lancets-Test blood sugar  daily for DM-2 (250.00) 1 Package 11    glucose blood VI test strips (ONE TOUCH ULTRA TEST) strip Test blood sugar  daily for DM-2 (250.00) 100 strip 11           PREVIOUS WORKUP: (reviewed)  IMAGING:    CT Results (recent):  No results found for this or any previous visit. MRI Results (recent):    Results from [place] encounter on 08/13/13    [facility] CONT   Narrative **Final Report**      ICD Codes / Adm. Diagnosis: 805.3  723.0 / Open fracture of dorsal Gogoamparoe Garcia    Examination:   T SPINE Zahraa Gonzalez  - [id number] - Aug 13 2013  2:16PM  Accession No:  [id number]  Reason:  Cervicalgia. Spinal stenosis in cervical region      REPORT:  INDICATION:  Cervicalgia.  Spinal stenosis in cervical region    EXAMINATION:  MR THORACIC SPINE    COMPARISON: None    TECHNIQUE: MR imaging of the thoracic spine was performed with the following   sequences: sagittal T1, T2, stir; axial T1, gradient echo. Contrast was not   administered. FINDINGS:    There is normal alignment of the thoracic spine. There is mild superior   endplate deformity involving T3 and T6, without associated marrow edema. Superimposed Schmorl's node formation and slight superior endplate deformity   evident at T11. Marrow signal is normal. The course, caliber, and signal   intensity of the spinal cord are normal. The spinal canal and neural   foramina are widely patent at all levels. IMPRESSION:  Mild chronic supratentorial deformities at T3 and T6, with Schmorl's node   formation at the superior endplate of P51. No acute or subacute compression   fracture. No significant spinal canal or foraminal stenosis at any level. Signing/Reading Doctor: Reji Sol ([id number])    Approved: Reji Sol ([id number])  Aug 13 2013  3:00PM                                     IR Results (recent):  No results found for this or any previous visit. VAS/US Results (recent):  No results found for this or any previous visit.         LABS (reviewed)  Results for orders placed or performed in visit on 03/09/18   OCCULT BLOOD, IMMUNOASSAY (FIT)   Result Value Ref Range    Occult blood fecal, by IA Negative Negative   LIPID PANEL   Result Value Ref Range    Cholesterol, total 237 (H) 100 - 199 mg/dL    Triglyceride 89 0 - 149 mg/dL    HDL Cholesterol 104 >39 mg/dL    VLDL, calculated 18 5 - 40 mg/dL    LDL, calculated 115 (H) 0 - 99 mg/dL   MAGNESIUM   Result Value Ref Range    Magnesium 2.0 1.6 - 2.3 mg/dL   METABOLIC PANEL, COMPREHENSIVE   Result Value Ref Range    Glucose 104 (H) 65 - 99 mg/dL    BUN 10 8 - 27 mg/dL    Creatinine 0.57 0.57 - 1.00 mg/dL    GFR est non- >59 mL/min/1.73    GFR est  >59 mL/min/1.73    BUN/Creatinine ratio 18 12 - 28    Sodium 142 134 - 144 mmol/L    Potassium 4.6 3.5 - 5.2 mmol/L    Chloride 98 96 - 106 mmol/L    CO2 27 18 - 29 mmol/L    Calcium 9.7 8.7 - 10.3 mg/dL    Protein, total 7.4 6.0 - 8.5 g/dL    Albumin 5.0 (H) 3.6 - 4.8 g/dL    GLOBULIN, TOTAL 2.4 1.5 - 4.5 g/dL    A-G Ratio 2.1 1.2 - 2.2    Bilirubin, total 0.5 0.0 - 1.2 mg/dL    Alk. phosphatase 68 39 - 117 IU/L    AST (SGOT) 28 0 - 40 IU/L    ALT (SGPT) 21 0 - 32 IU/L   TSH 3RD GENERATION   Result Value Ref Range    TSH 0.691 0.450 - 4.500 uIU/mL   T4, FREE   Result Value Ref Range    T4, Free 1.74 0.82 - 1.77 ng/dL   HEMOGLOBIN A1C WITH EAG   Result Value Ref Range    Hemoglobin A1c 5.6 4.8 - 5.6 %    Estimated average glucose 114 mg/dL   CBC WITH AUTOMATED DIFF   Result Value Ref Range    WBC 8.1 3.4 - 10.8 x10E3/uL    RBC 4.56 3.77 - 5.28 x10E6/uL    HGB 15.9 11.1 - 15.9 g/dL    HCT 46.7 (H) 34.0 - 46.6 %     (H) 79 - 97 fL    MCH 34.9 (H) 26.6 - 33.0 pg    MCHC 34.0 31.5 - 35.7 g/dL    RDW 13.2 12.3 - 15.4 %    PLATELET 043 069 - 455 x10E3/uL    NEUTROPHILS 81 Not Estab. %    Lymphocytes 11 Not Estab. %    MONOCYTES 7 Not Estab. %    EOSINOPHILS 1 Not Estab. %    BASOPHILS 0 Not Estab. %    ABS. NEUTROPHILS 6.5 1.4 - 7.0 x10E3/uL    Abs Lymphocytes 0.9 0.7 - 3.1 x10E3/uL    ABS. MONOCYTES 0.6 0.1 - 0.9 x10E3/uL    ABS. EOSINOPHILS 0.1 0.0 - 0.4 x10E3/uL    ABS. BASOPHILS 0.0 0.0 - 0.2 x10E3/uL    IMMATURE GRANULOCYTES 0 Not Estab. %    ABS. IMM.  GRANS. 0.0 0.0 - 0.1 x10E3/uL   AMB POC URINALYSIS DIP STICK AUTO W/O MICRO   Result Value Ref Range    Color (UA POC) Yellow     Clarity (UA POC) Clear     Glucose (UA POC) Negative Negative    Bilirubin (UA POC) Negative Negative    Ketones (UA POC) Negative Negative    Specific gravity (UA POC) 1.015 1.001 - 1.035    Blood (UA POC) 1+ Negative    pH (UA POC) 5 4.6 - 8.0    Protein (UA POC) Negative Negative    Urobilinogen (UA POC) 0.2 mg/dL 0.2 - 1    Nitrites (UA POC) Negative Negative    Leukocyte esterase (UA POC) Negative Negative   AMB POC URINE, MICROALBUMIN, SEMIQUANT (3 RESULTS)   Result Value Ref Range    ALBUMIN, URINE POC 10 Negative mg/L    CREATININE, URINE  mg/dL    Microalbumin/creat ratio (POC) <30 <30 MG/G       Physical Exam:     Visit Vitals    /60    Pulse 82    Wt 38.9 kg (85 lb 12.8 oz)    BMI 13.85 kg/m2     General:  Alert, cooperative, no distress. Head:  Normocephalic, without obvious abnormality, atraumatic. Eyes:  Conjunctivae/corneas clear. Lungs:  Heart:   Non labored breathing  Regular rate and rhythm, no carotid bruits   Abdomen:   Soft, non-distended   Extremities: Extremities normal, atraumatic, no cyanosis or edema. Pulses: 2+ and symmetric all extremities. Skin: Skin color, texture, turgor normal. No rashes or lesions. Neurologic Exam     Gen: Attention normal             Language: naming, repetition, fluency normal             Memory: intact recent and remote memory  Cranial Nerves:  I: smell Not tested   II: visual fields Full to confrontation   II: pupils Equal, round, reactive to light   II: optic disc No papilledema   III,VII: ptosis none   III,IV,VI: extraocular muscles  Full ROM   V: mastication normal   V: facial light touch sensation  normal   VII: facial muscle function   symmetric   VIII: hearing symmetric   IX: soft palate elevation  normal   XI: trapezius strength  5/5   XI: sternocleidomastoid strength 5/5   XI: neck flexion strength  5/5   XII: tongue  midline     Motor: normal bulk and tone, no tremor           R side 5/5  L arm 5-/5, L leg 5-/5  Decrease L foot tapping  Sensory: intact to LT, PP, vibration, and JPS  Reflexes: 2+ throughout; Down going toes  Coordination: Good FTN and HTS  Gait: drags L foot           Impression:     Jason Reilly is a 58 y.o. female who  has a past medical history of Anxiety; Asthma (12/6/2010); COPD [facility]ch[facility] obstructive [facility] disease) [facility] (12/6/2010); Depression (9/19/2014); Diabetes ([facility]  Fibromyalgia; Hyperlipemia (6/16/2010); MS (multiple sclerosis) ([facility] Neuropathy; and Syncope. She also has no past medical history of Abuse; Anemia NEC; Calculus of kidney; Congestive heart failure, unspecified; Contact dermatitis and other eczema, due to unspecified cause; Headache(784.0); Psychotic disorder; or Trauma. smoker who in 2008, while in [place], patient woke up with weakness of L side. Patient was admitted to hospital where MRI brain showed no stroke but a \"MS lesion\" in brain. For some reason was placed on Keppra (had a reaction). Since then, patient has residual L sided weakness. Patient was seen by neurologist in [place], offered Luite Dante 87 medication but declined. Patient eventually saw a neurologist at [place] [address] (Dr Stephani Garcia) and had occipital nerve block with no clear benefit. Patient is concern with weight loss but so far work up has been unremarkable. RECOMMENDATIONS  1. Will do MRI brain with YOANDY to clarify diagnosis of MS. Because of her history of smoking, a stroke is a possibility. Certainly her cervical spinal stenosis can also cause L sided weakness  2. Depending on above, will consider spinal tap, although patient expressed that she is afraid of needles  3. Discussed that if this is truly MS, it is stable and she has minimal residual symptoms to explain her significant weight loss  4. With the history of smoking and father having TB, a neoplastic process and TB causing her weight loss should be pursued which I will defer to her PCP if work up has not been done    Follow-up Disposition:  Return in about 1 month (around 6/8/2018).       Thank you for the consultation      Blessing Hare MD  Diplomate, American Board of Psychiatry and Neurology  Diplomate, Neuromuscular Medicine  Diplomate, American Board of Electrodiagnostic Medicine        CC: Yuko Hussein MD  Fax: [phone]

## 2018-05-08 NOTE — PATIENT INSTRUCTIONS
[facility]   Statement to Patients  April 1, 2014      In an effort to ensure the large volume of patient prescription refills is processed in the most efficient and expeditious manner, we are asking our patients to assist us by calling your Pharmacy for all prescription refills, this will include also your  Mail Order Pharmacy. The pharmacy will contact our office electronically to continue the refill process. Please do not wait until the last minute to call your pharmacy. We need at least 48 hours (2days) to fill prescriptions. We also encourage you to call your pharmacy before going to  your prescription to make sure it is ready. With regard to controlled substance prescription refill requests (narcotic refills) that need to be picked up at our office, we ask your cooperation by providing us with at least 72 hours (3days) notice that you will need a refill. We will not refill narcotic prescription refill requests after 4:00pm on any weekday, Monday through Thursday, or after 2:00pm on Fridays, or on the weekends. We encourage everyone to explore another way of getting your prescription refill request processed using [email or web address], our patient web portal through our electronic medical record system. [email or web address] is an efficient and effective way to communicate your medication request directly to the office and  downloadable as an pebbles on your smart phone . [email or web address] also features a review functionality that allows you to view your medication list as well as leave messages for your physician. Are you ready to get connected? If so please review the attatched instructions or speak to any of our staff to get you set up right away! Thank you so much for your cooperation. Should you have any questions please contact our Practice Administrator.     The Physicians and Staff,  [facility] [address] Neu[facility] Clinic Statement to Patients  April 1, 2014      In an effort to ensure the large volume of patient prescription refills is processed in the most efficient and expeditious manner, we are asking our patients to assist us by calling your Pharmacy for all prescription refills, this will include also your  Mail Order Pharmacy. The pharmacy will contact our office electronically to continue the refill process. Please do not wait until the last minute to call your pharmacy. We need at least 48 hours (2days) to fill prescriptions. We also encourage you to call your pharmacy before going to  your prescription to make sure it is ready. With regard to controlled substance prescription refill requests (narcotic refills) that need to be picked up at our office, we ask your cooperation by providing us with at least 72 hours (3days) notice that you will need a refill. We will not refill narcotic prescription refill requests after 4:00pm on any weekday, Monday through Thursday, or after 2:00pm on Fridays, or on the weekends. We encourage everyone to explore another way of getting your prescription refill request processed using [employer], our patient web portal through our electronic medical record system. [employer] is an efficient and effective way to communicate your medication request directly to the office and  downloadable as an pebbles on your smart phone . [employer] also features a review functionality that allows you to view your medication list as well as leave messages for your physician. Are you ready to get connected? If so please review the attatched instructions or speak to any of our staff to get you set up right away! Thank you so much for your cooperation. Should you have any questions please contact our Practice Administrator.     The Physicians and Staff,  [facility]

## 2018-05-08 NOTE — LETTER
5/8/2018 1:35 PM 
 
Patient:  Trudy Panchal YOB: 1955 Date of Visit: 5/8/2018 Dear Cameron Britton MD 
[address] [address] VIA In Basket Maryanne Catalan MD 
[address] [address] VIA In Basket 
 : Thank you for referring Ms. Hermann Watson to me for evaluation/treatment. Below are the relevant portions of my assessment and plan of care. If you have questions, please do not hesitate to call me. I look forward to following Ms. Jo-Ann Thompson along with you. Sincerely, Dayton Frost MD

## 2018-06-06 ENCOUNTER — OFFICE VISIT (OUTPATIENT)
Dept: FAMILY MEDICINE CLINIC | Age: 63
End: 2018-06-06

## 2018-06-06 VITALS
BODY MASS INDEX: 13.98 KG/M2 | DIASTOLIC BLOOD PRESSURE: 78 MMHG | HEART RATE: 84 BPM | SYSTOLIC BLOOD PRESSURE: 131 MMHG | HEIGHT: 66 IN | WEIGHT: 87 LBS | TEMPERATURE: 97.6 F | OXYGEN SATURATION: 95 % | RESPIRATION RATE: 22 BRPM

## 2018-06-06 DIAGNOSIS — R05.9 COUGH: ICD-10-CM

## 2018-06-06 DIAGNOSIS — J43.8 OTHER EMPHYSEMA (HCC): Primary | ICD-10-CM

## 2018-06-06 DIAGNOSIS — R63.6 UNDERWEIGHT: ICD-10-CM

## 2018-06-06 DIAGNOSIS — F17.201 TOBACCO ABUSE, IN REMISSION: ICD-10-CM

## 2018-06-06 RX ORDER — PREDNISONE 10 MG/1
TABLET ORAL
Qty: 30 TAB | Refills: 0 | Status: SHIPPED | OUTPATIENT
Start: 2018-06-06 | End: 2018-07-20

## 2018-06-06 RX ORDER — BENZONATATE 200 MG/1
200 CAPSULE ORAL
Qty: 30 CAP | Refills: 2 | Status: SHIPPED | OUTPATIENT
Start: 2018-06-06 | End: 2019-03-26

## 2018-06-06 NOTE — PROGRESS NOTES
1. Have you been to the ER, urgent care clinic since your last visit? Hospitalized since your last visit? No    2. Have you seen or consulted any other health care providers outside of the [address] [place] [address] since your last visit? Include any pap smears or colon screening. No  Reviewed record in preparation for visit and have necessary documentation  Pt did not bring medication to office visit for review    Goals that were addressed and/or need to be completed during or after this appointment include     Health Maintenance Due   Topic Date Due    DTaP/Tdap/Td series (1 - Tdap) 07/15/1976    ZOSTER VACCINE AGE 60>  05/15/2015    BREAST CANCER SCRN MAMMOGRAM  07/24/2015    EYE EXAM RETINAL OR DILATED Q1  08/06/2016    PAP AKA CERVICAL CYTOLOGY  10/22/2017    FOOT EXAM Q1  10/24/2017    MEDICARE YEARLY EXAM  03/14/2018         Pt reports quitting smoking 2 weeks ago and since she has been having worsened cough and episodes of shortness of breathe. Reports using her combivent inhaler more than prescribed due to symptoms.

## 2018-06-06 NOTE — MR AVS SNAPSHOT
[facility] 
 
 
 [address] [address] 
[phone] Patient: Edith Lau MRN: [id number] QGC:[id number]/[id number] Visit Information Date & Time Provider Department Dept. Phone Encounter #  
 6/6/2018  3:00 PM Reyna Robert MD [address] [id number] Your Appointments 6/8/2018  1:40 PM  
Follow Up with April Linda MD  
[facility]) Appt Note: 1 month f/u MRI Brain Results [place] [address] [place] [facility] [id number] [phone]  
  
   
 [place] [address] [id number] [address] 6/11/2018  9:00 AM  
Complete Physical with Seferino Orourke MD  
[address] [place] [address]) Appt Note: 3 mnth fu /[facility]/  
 [address] [facility] [address]  
[address] [place] [id number] Upcoming Health Maintenance Date Due DTaP/Tdap/Td series (1 - Tdap) 7/15/1976 ZOSTER VACCINE AGE 60> 5/15/2015 BREAST CANCER SCRN MAMMOGRAM 7/24/2015 EYE EXAM RETINAL OR DILATED Q1 8/6/2016 PAP AKA CERVICAL CYTOLOGY 10/22/2017 FOOT EXAM Q1 10/24/2017 MEDICARE YEARLY EXAM 3/14/2018 Influenza Age 5 to Adult 8/1/2018 HEMOGLOBIN A1C Q6M 9/9/2018 MICROALBUMIN Q1 3/9/2019 LIPID PANEL Q1 3/9/2019 Allergies as of 6/6/2018  Review Complete On: 6/6/2018 By: Rosalio Delgado LPN Severity Noted Reaction Type Reactions Evista [Raloxifene] Medium 10/02/2013   Side Effect Diarrhea, Swelling Makes throat swell Doxycycline  06/11/2010    Swelling Tongue Pcn [Penicillins]  06/05/2010    Swelling Of the tongue Spiriva With Handihaler [Tiotropium Bromide]  06/11/2010    Other (comments) Chest pain Current Immunizations  Reviewed on 8/31/2017 Name Date H1N1 FLU VACCINE 1/25/2010 Influenza Vaccine 10/19/2015, 10/2/2013 Influenza Vaccine (Quad) PF 10/4/2016 Influenza Vaccine Split 10/31/2012, 10/12/2011,  Deferred (Patient Refused) Influenza Vaccine Whole 1/25/2010 Pneumococcal Conjugate (PCV-13) 10/24/2016 Pneumococcal Polysaccharide (PPSV-23) 3/30/2016, 3/16/2009 Not reviewed this visit You Were Diagnosed With   
  
 Codes Comments Other [facility]se[facility] ([facility]    -  Primary ICD-10-CM: J43.8 ICD-9-CM: 492.8 Cough     ICD-10-CM: R05 ICD-9-CM: 861. 2 Vitals BP Pulse Temp Resp Height(growth percentile) Weight(growth percentile) 131/78 (BP 1 Location: Right arm, BP Patient Position: Sitting) 84 97.6 °F (36.4 °C) (Oral) 22 5' 6\" (1.676 m) 87 lb (39.5 kg) SpO2 BMI OB Status Smoking Status 95% 14.04 kg/m2 Postmenopausal Former Smoker Vitals History BMI and BSA Data Body Mass Index Body Surface Area 14.04 kg/m 2 1.36 m 2 Preferred Pharmacy Pharmacy Name Phone [facility], [address] [place] [address] [phone] Your Updated Medication List  
  
   
This list is accurate as of 6/6/18  3:40 PM.  Always use your most recent med list.  
  
  
  
  
 benzonatate 200 mg capsule Commonly known as:  TESSALON Take 1 Cap by mouth three (3) times daily as needed for Cough. cyclobenzaprine 10 mg tablet Commonly known as:  FLEXERIL Take 1 Tab by mouth three (3) times daily as needed for Muscle Spasm(s). glucose blood VI test strips strip Commonly known as:  ONETOUCH ULTRA TEST Test blood sugar  daily for DM-2 (250.00) ibandronate 150 mg tablet Commonly known as:  Helen Metro Take 150 mg by mouth every thirty (30) days. ipratropium-albuterol  mcg/actuation inhaler Commonly known as:  COMBIVENT RESPIMAT  
USE 1 PUFF BY MOUTH EVERY 6 HOURS Lancets Misc One Touch Lancets-Test blood sugar  daily for DM-2 (250.00) mometasone-formoterol 100-5 mcg/actuation HFA inhaler Commonly known as:  Mercedes Comfort Take 2 Puffs by inhalation two (2) times a day. oxyCODONE-acetaminophen  mg per tablet Commonly known as:  PERCOCET 10 Take 1 Tab by mouth.  
  
 predniSONE 10 mg tablet Commonly known as:  Alcario Shires Take 4 tabs for three days, Then take 3 tabs for three days, Then take 2 tabs for three days, Then take 1 tab for three days. zafirlukast 10 mg tablet Commonly known as:  ACCOLATE  
TAKE 1 TABLET BY MOUTH DAILY WITH LUNCH Prescriptions Sent to Pharmacy Refills  
 ipratropium-albuterol (COMBIVENT RESPIMAT)  mcg/actuation inhaler 3 Sig: USE 1 PUFF BY MOUTH EVERY 6 HOURS Class: Normal  
 Pharmacy: [address] [place] [address] Ph #: [phone]  
 mometasone-formoterol (DULERA) 100-5 mcg/actuation HFA inhaler 3 Sig: Take 2 Puffs by inhalation two (2) times a day. Class: Normal  
 Pharmacy: [address] [place] [address] Ph #: [phone] Route: Inhalation  
 predniSONE (DELTASONE) 10 mg tablet 0 Sig: Take 4 tabs for three days, Then take 3 tabs for three days, Then take 2 tabs for three days, Then take 1 tab for three days. Class: Normal  
 Pharmacy: [address] [place] [address] Ph #: [phone]  
 benzonatate (TESSALON) 200 mg capsule 2 Sig: Take 1 Cap by mouth three (3) times daily as needed for Cough. Class: Normal  
 Pharmacy: [address] [place] [address] Ph #: [phone] Route: Oral  
  
Introducing [facility]! [employer] introduces [employer] patient portal. Now you can access parts of your medical record, email your doctor's office, and request medication refills online. 1. In your internet browser, go to [email or web address] ([employer]t 2. Click on the First Time User? Click Here link in the Sign In box. You will see the New Member Sign Up page. 3. Enter your G[employer] Access Code exactly as it appears below. You will not need to use this code after youve completed the sign-up process. If you do not sign up before the expiration date, you must request a new code. · GainSpan Access Code: [email or web address] Expires: 8/6/2018 12:20 PM 
 
4. Enter the last four digits of your Social Security Number (xxxx) and Date of Birth (mm/dd/yyyy) as indicated and click Submit. You will be taken to the next sign-up page. 5. Create a GainSpan ID. This will be your GainSpan login ID and cannot be changed, so think of one that is secure and easy to remember. 6. Create a G[employer]Span password. You can change your password at any time. 7. Enter your Password Reset Question and Answer. This can be used at a later time if you forget your password. 8. Enter your e-mail address. You will receive e-mail notification when new information is available in [address] 9. Click Sign Up. You can now view and download portions of your medical record. 10. Click the Download Summary menu link to download a portable copy of your medical information. If you have questions, please visit the Frequently Asked Questions section of the G[employer] website. Remember, G[employer]Span is NOT to be used for urgent needs. For medical emergencies, dial 911. Now available from your iPhone and Android! Please provide this summary of care documentation to your next provider. Your primary care clinician is listed as [address]. If you have any questions after today's visit, please call [phone].

## 2018-06-07 ENCOUNTER — TELEPHONE (OUTPATIENT)
Dept: FAMILY MEDICINE CLINIC | Age: 63
End: 2018-06-07

## 2018-06-07 NOTE — TELEPHONE ENCOUNTER
Pharmacy contacted the office in regards to prescriptions from yesterday's OV. Attempted to call patient to notify her that the prior auth for Benzonatate 200mg was denied for the following reason:   Medicare part D plans do not cover any drugs for the treatment of cough/cold symptoms.

## 2018-06-07 NOTE — TELEPHONE ENCOUNTER
Spoke to Mrs. Valentina Lara. She states she will just pay the $20 for the medication. She also reports feeling \"off\" after taking 4 doses of prednisone. She states she has no energy and 'sounds are now bothering me when they never have before'. She wants to know if she would be fine taking only 2 a day instead. Please advise? ? Notified her Dr. Geni Lomeli would be back in clinic in the morning and I would call her and let her know his response.

## 2018-06-08 NOTE — TELEPHONE ENCOUNTER
Per Dr. Rusty Seo, she may take 2 tablets daily for 1 week, and then decrease to 1 tablet daily. Relayed this information to Mrs. Harsha Anaya. She was appreciative and I advised her to call back if she continues having issues with the prednisone.

## 2018-06-20 ENCOUNTER — OFFICE VISIT (OUTPATIENT)
Dept: FAMILY MEDICINE CLINIC | Age: 63
End: 2018-06-20

## 2018-06-20 VITALS
OXYGEN SATURATION: 94 % | TEMPERATURE: 97.6 F | WEIGHT: 86.8 LBS | SYSTOLIC BLOOD PRESSURE: 118 MMHG | HEIGHT: 66 IN | DIASTOLIC BLOOD PRESSURE: 72 MMHG | HEART RATE: 83 BPM | BODY MASS INDEX: 13.95 KG/M2 | RESPIRATION RATE: 18 BRPM

## 2018-06-20 DIAGNOSIS — Z00.00 MEDICARE ANNUAL WELLNESS VISIT, SUBSEQUENT: ICD-10-CM

## 2018-06-20 DIAGNOSIS — Z13.31 SCREENING FOR DEPRESSION: ICD-10-CM

## 2018-06-20 DIAGNOSIS — Z13.39 SCREENING FOR ALCOHOLISM: ICD-10-CM

## 2018-06-20 DIAGNOSIS — J43.8 OTHER EMPHYSEMA (HCC): Primary | ICD-10-CM

## 2018-06-20 RX ORDER — IPRATROPIUM BROMIDE AND ALBUTEROL SULFATE 2.5; .5 MG/3ML; MG/3ML
3 SOLUTION RESPIRATORY (INHALATION)
Qty: 90 NEBULE | Refills: 1 | Status: SHIPPED | OUTPATIENT
Start: 2018-06-20 | End: 2018-07-20 | Stop reason: SDUPTHER

## 2018-06-20 RX ORDER — NEBULIZER AND COMPRESSOR
EACH MISCELLANEOUS
Qty: 1 EACH | Refills: 0 | Status: SHIPPED | OUTPATIENT
Start: 2018-06-20

## 2018-06-20 NOTE — PATIENT INSTRUCTIONS
Learning About COPD Triggers  What are triggers? When you have COPD (chronic obstructive pulmonary disease), certain things can make your symptoms worse. These are called triggers. They include:  · Cigarette smoke or air pollution. · Illnesses like colds, flu, or pneumonia. · Cleaning supplies or other chemicals. · Gases, particles, or fumes from wood or kerosene home heaters. Not all people have the same triggers. What may cause symptoms in one person may not be a problem for another person. How do triggers affect COPD? Triggers can make it harder for your lungs to work as they should and can lead to sudden difficulty breathing and other symptoms. When you are around a trigger, a COPD flare-up is more likely. If your symptoms are severe, you may need emergency treatment or have to go to the hospital for treatment. If you know what your triggers are and can avoid them, you can reduce how often you have flare-ups and how much COPD affects your life. It's also important to be active and to take your daily medicines as prescribed. This helps prevent flare-ups too. What can you do to avoid triggers? The first thing is to know your triggers. When you are having symptoms, note the things around you that might be causing them. Then look for patterns in what may be triggering your symptoms. When you have your list of possible triggers, work with your doctor to find ways to avoid them. Here are some ways to avoid a few common triggers. · Do not smoke or allow others to smoke around you. If you need help quitting, talk to your doctor about stop-smoking programs and medicines. These can increase your chances of quitting for good. · If there is a lot of pollution, pollen, or dust outside, stay at home and keep your windows closed. Use an air conditioner or air filter in your home. Check your local weather report or newspaper for air quality and pollen reports. · Get a flu vaccine every year.  Talk to your doctor about getting a pneumococcal shot. Wash your hands often to prevent infections. How can you manage a flare-up? Do not panic if you start to have a COPD flare-up. · If you have a COPD action plan, follow the plan. In general:  ¨ Use your quick-relief inhaler as directed by your doctor. If your symptoms do not get better after you use your medicine, have someone take you to the emergency room. Call an ambulance if needed. ¨ Use a spacer with your metered-dose inhaler (MDI). If you have a nebulizer for inhaled medicine, use it. A spacer or nebulizer may help get more medicine to your lungs. ¨ If your doctor has given you other inhaled medicines or steroid pills, take them as directed. ¨ If your doctor has given you a prescription for an antibiotic, fill it if you need to. ¨ Call your doctor if you have to use your antibiotic or steroid pills. Where can you learn more? Go to http://valentinaLocaMapelvis.info/. Enter K473 in the search box to learn more about \"Learning About COPD Triggers. \"  Current as of: May 12, 2017  Content Version: 11.4  © 9404-1383 [employer]. Care instructions adapted under license by [employer] (which disclaims liability or warranty for this information). If you have questions about a medical condition or this instruction, always ask your healthcare professional. [address] any warranty or liability for your use of this information. Medicare Wellness Visit, Female    The best way to live healthy is to have a lifestyle where you eat a well-balanced diet, exercise regularly, limit alcohol use, and quit all forms of tobacco/nicotine, if applicable. Regular preventive services are another way to keep healthy. Preventive services (vaccines, screening tests, monitoring & exams) can help personalize your care plan, which helps you manage your own care.  Screening tests can find health problems at the earliest stages, when they are easiest to treat. [address] [facility] follows the current, evidence-based guidelines published by the [facility] Roni Bonilla (USPSTF) when recommending preventive services for our patients. Because we follow these guidelines, sometimes recommendations change over time as research supports it. (For example, mammograms used to be recommended annually. Even though Medicare will still pay for an annual mammogram, the newer guidelines recommend a mammogram every two years for women of average risk.)    Of course, you and your provider may decide to screen more often for some diseases, based on your risk and co-morbidities (chronic disease you are already diagnosed with). Preventive services for you include:    - Medicare offers their members a free annual wellness visit, which is time for you and your primary care provider to discuss and plan for your preventive service needs. Take advantage of this benefit every year!    -All people over age 72 should receive the recommended pneumonia vaccines. Current USPSTF guidelines recommend a series of two vaccines for the best pneumonia protection.     -All adults should have a yearly flu vaccine and a tetanus vaccine every 10 years. All adults age 61 years should receive a shingles vaccine once in their lifetime.      -A bone mass density test is recommended when a woman turns 65 to screen for osteoporosis. This test is only recommended once as a screening. Some providers will use this same test as a disease monitoring tool if you already have osteoporosis.     -All adults age 38-68 years who are overweight should have a diabetes screening test once every three years.     -Other screening tests & preventive services for persons with diabetes include: an eye exam to screen for diabetic retinopathy, a kidney function test, a foot exam, and stricter control over your cholesterol.     -Cardiovascular screening for adults with routine risk involves an electrocardiogram (ECG) at intervals determined by the provider.     -Colorectal cancer screenings should be done for adults age 54-65 years with normal risk. There are a number of acceptable methods of screening for this type of cancer. Each test has its own benefits and drawbacks. Discuss with your provider what is most appropriate for you during your annual wellness visit. The different tests include: colonoscopy (considered the best screening method), a fecal occult blood test, a fecal DNA test, and sigmoidoscopy. -Breast cancer screenings are recommended every other year for women of normal risk age 54-69 years.     -Cervical cancer screenings for women over age 72 are only recommended with certain risk factors.     -All adults born between [facility] should be screened once for Hepatitis C.      Here is a list of your current Health Maintenance items (your personalized list of preventive services) with a due date:  Health Maintenance Due   Topic Date Due    DTaP/Tdap/Td  (1 - Tdap) 07/15/1976    Shingles Vaccine  05/15/2015    Breast Cancer Screening  07/24/2015    Eye Exam  08/06/2016    Cervical Cancer Screening  10/22/2017    Diabetic Foot Care  10/24/2017    Annual Well Visit  03/14/2018

## 2018-06-20 NOTE — MR AVS SNAPSHOT
[address] [address] 
[phone] Patient: Rico Louis MRN: [id number] DQA:[id number]/[id number] Visit Information Date & Time Provider Department Dept. Phone Encounter #  
 6/20/2018  3:00 PM Santa Galloway  [facility] [phone] [id number] Upcoming Health Maintenance Date Due DTaP/Tdap/Td series (1 - Tdap) 7/15/1976 ZOSTER VACCINE AGE 60> 5/15/2015 BREAST CANCER SCRN MAMMOGRAM 7/24/2015 EYE EXAM RETINAL OR DILATED Q1 8/6/2016 PAP AKA CERVICAL CYTOLOGY 10/22/2017 FOOT EXAM Q1 10/24/2017 MEDICARE YEARLY EXAM 3/14/2018 Influenza Age 5 to Adult 8/1/2018 HEMOGLOBIN A1C Q6M 9/9/2018 MICROALBUMIN Q1 3/9/2019 LIPID PANEL Q1 3/9/2019 Allergies as of 6/20/2018  Review Complete On: 6/20/2018 By: Kane Host Severity Noted Reaction Type Reactions Evista [Raloxifene] Medium 10/02/2013   Side Effect Diarrhea, Swelling Makes throat swell Doxycycline  06/11/2010    Swelling Tongue Pcn [Penicillins]  06/05/2010    Swelling Of the tongue Spiriva With Handihaler [Tiotropium Bromide]  06/11/2010    Other (comments) Chest pain Current Immunizations  Reviewed on 8/31/2017 Name Date H1N1 FLU VACCINE 1/25/2010 Influenza Vaccine 10/19/2015, 10/2/2013 Influenza Vaccine (Quad) PF 10/4/2016 Influenza Vaccine Split 10/31/2012, 10/12/2011,  Deferred (Patient Refused) Influenza Vaccine Whole 1/25/2010 Pneumococcal Conjugate (PCV-13) 10/24/2016 Pneumococcal Polysaccharide (PPSV-23) 3/30/2016, 3/16/2009 Not reviewed this visit You Were Diagnosed With   
  
 Codes Comments Other [facility]    -  Primary ICD-10-CM: J43.8 ICD-9-CM: 492.8 Vitals BP Pulse Temp Resp Height(growth percentile) Weight(growth percentile)  118/72 (BP 1 Location: Left arm, BP Patient Position: Sitting) 83 97.6 °F (36.4 °C) (Oral) 18 5' 6\" (1.676 m) 86 lb 12.8 oz (39.4 kg) SpO2 BMI OB Status Smoking Status 94% 14.01 kg/m2 Postmenopausal Former Smoker Vitals History BMI and BSA Data Body Mass Index Body Surface Area 14.01 kg/m 2 1.35 m 2 Preferred Pharmacy Pharmacy Name Phone [address] [facility],  [place] [phone] Your Updated Medication List  
  
   
This list is accurate as of 6/20/18  3:38 PM.  Always use your most recent med list.  
  
  
  
  
 benzonatate 200 mg capsule Commonly known as:  TESSALON Take 1 Cap by mouth three (3) times daily as needed for Cough. cyclobenzaprine 10 mg tablet Commonly known as:  FLEXERIL Take 1 Tab by mouth three (3) times daily as needed for Muscle Spasm(s). glucose blood VI test strips strip Commonly known as:  ONETOUCH ULTRA TEST Test blood sugar  daily for DM-2 (250.00) ibandronate 150 mg tablet Commonly known as:  Ever Gory Take 150 mg by mouth every thirty (30) days. * ipratropium-albuterol  mcg/actuation inhaler Commonly known as:  COMBIVENT RESPIMAT  
USE 1 PUFF BY MOUTH EVERY 6 HOURS  
  
 * albuterol-ipratropium 2.5 mg-0.5 mg/3 ml Nebu Commonly known as:  DUO-NEB  
3 mL by Nebulization route every four (4) hours as needed. Lancets Misc One Touch Lancets-Test blood sugar  daily for DM-2 (250.00) mometasone-formoterol 100-5 mcg/actuation HFA inhaler Commonly known as:  [place] Folks Take 2 Puffs by inhalation two (2) times a day. Nebulizer & Compressor machine For use as breathing treatment every 4 - 6 hours as needed. Nebulizer Accessories Kit For use as breathing treatment every 4 - 6 hours as needed. oxyCODONE-acetaminophen  mg per tablet Commonly known as:  PERCOCET 10 Take 1 Tab by mouth.  
  
 predniSONE 10 mg tablet Commonly known as:  Phillip Scott  
 Take 4 tabs for three days, Then take 3 tabs for three days, Then take 2 tabs for three days, Then take 1 tab for three days. zafirlukast 10 mg tablet Commonly known as:  ACCOLATE  
TAKE 1 TABLET BY MOUTH DAILY WITH LUNCH  
  
 * Notice: This list has 2 medication(s) that are the same as other medications prescribed for you. Read the directions carefully, and ask your doctor or other care provider to review them with you. Prescriptions Printed Refills Nebulizer & Compressor machine 0 Sig: For use as breathing treatment every 4 - 6 hours as needed. Class: Print Nebulizer Accessories kit 0 Sig: For use as breathing treatment every 4 - 6 hours as needed. Class: Print  
 albuterol-ipratropium (DUO-NEB) 2.5 mg-0.5 mg/3 ml nebu 1 Sig: 3 mL by Nebulization route every four (4) hours as needed. Class: Print Route: Nebulization Patient Instructions Learning About COPD Triggers What are triggers? When you have COPD (chronic obstructive pulmonary disease), certain things can make your symptoms worse. These are called triggers. They include: · Cigarette smoke or air pollution. · Illnesses like colds, flu, or pneumonia. · Cleaning supplies or other chemicals. · Gases, particles, or fumes from wood or kerosene home heaters. Not all people have the same triggers. What may cause symptoms in one person may not be a problem for another person. How do triggers affect COPD? Triggers can make it harder for your lungs to work as they should and can lead to sudden difficulty breathing and other symptoms. When you are around a trigger, a COPD flare-up is more likely. If your symptoms are severe, you may need emergency treatment or have to go to the hospital for treatment. If you know what your triggers are and can avoid them, you can reduce how often you have flare-ups and how much COPD affects your life.  It's also important to be active and to take your daily medicines as prescribed. This helps prevent flare-ups too. What can you do to avoid triggers? The first thing is to know your triggers. When you are having symptoms, note the things around you that might be causing them. Then look for patterns in what may be triggering your symptoms. When you have your list of possible triggers, work with your doctor to find ways to avoid them. Here are some ways to avoid a few common triggers. · Do not smoke or allow others to smoke around you. If you need help quitting, talk to your doctor about stop-smoking programs and medicines. These can increase your chances of quitting for good. · If there is a lot of pollution, pollen, or dust outside, stay at home and keep your windows closed. Use an air conditioner or air filter in your home. Check your local weather report or newspaper for air quality and pollen reports. · Get a flu vaccine every year. Talk to your doctor about getting a pneumococcal shot. Wash your hands often to prevent infections. How can you manage a flare-up? Do not panic if you start to have a COPD flare-up. · If you have a COPD action plan, follow the plan. In general: ¨ Use your quick-relief inhaler as directed by your doctor. If your symptoms do not get better after you use your medicine, have someone take you to the emergency room. Call an ambulance if needed. ¨ Use a spacer with your metered-dose inhaler (MDI). If you have a nebulizer for inhaled medicine, use it. A spacer or nebulizer may help get more medicine to your lungs. ¨ If your doctor has given you other inhaled medicines or steroid pills, take them as directed. ¨ If your doctor has given you a prescription for an antibiotic, fill it if you need to. ¨ Call your doctor if you have to use your antibiotic or steroid pills. Where can you learn more? Go to http://valentina-elvis.info/. Enter W391 in the search box to learn more about \"Learning About COPD Triggers. \" Current as of: May 12, 2017 Content Version: 11.4 © 5968-4464 Healthwise, [employer]. Care instructions adapted under license by [employer] (which disclaims liability or warranty for this information). If you have questions about a medical condition or this instruction, always ask your healthcare professional. [address] any warranty or liability for your use of this information. Introducing [facility]! Rossi Pulido introduces Framebridge patient portal. Now you can access parts of your medical record, email your doctor's office, and request medication refills online. 1. In your internet browser, go to [email or web address] 2. Click on the First Time User? Click Here link in the Sign In box. You will see the New Member Sign Up page. 3. Enter your Framebridge Access Code exactly as it appears below. You will not need to use this code after youve completed the sign-up process. If you do not sign up before the expiration date, you must request a new code. · Framebridge Access Code: [email or web address] Expires: 8/6/2018 12:20 PM 
 
4. Enter the last four digits of your Social Security Number (xxxx) and Date of Birth (mm/dd/yyyy) as indicated and click Submit. You will be taken to the next sign-up page. 5. Create a Framebridge ID. This will be your Framebridge login ID and cannot be changed, so think of one that is secure and easy to remember. 6. Create a Framebridge password. You can change your password at any time. 7. Enter your Password Reset Question and Answer. This can be used at a later time if you forget your password. 8. Enter your e-mail address. You will receive e-mail notification when new information is available in [address] 9. Click Sign Up. You can now view and download portions of your medical record. 10. Click the Download Summary menu link to download a portable copy of your medical information. If you have questions, please visit the Frequently Asked Questions section of the TRAN.SL website. Remember, TRAN.SL is NOT to be used for urgent needs. For medical emergencies, dial 911. Now available from your iPhone and Android! Please provide this summary of care documentation to your next provider. If you have any questions after today's visit, please call [phone].

## 2018-06-20 NOTE — PROGRESS NOTES
Reviewed record in preparation for visit and have necessary documentation  Pt did not bring medication to office visit for review  Goals that were addressed and/or need to be completed during or after this appointment include   Health Maintenance Due   Topic Date Due    DTaP/Tdap/Td series (1 - Tdap) 07/15/1976    ZOSTER VACCINE AGE 60>  05/15/2015    BREAST CANCER SCRN MAMMOGRAM  07/24/2015    EYE EXAM RETINAL OR DILATED Q1  08/06/2016    PAP AKA CERVICAL CYTOLOGY  10/22/2017    FOOT EXAM Q1  10/24/2017    MEDICARE YEARLY EXAM  03/14/2018

## 2018-06-24 NOTE — PATIENT INSTRUCTIONS
Learning About COPD Triggers  What are triggers? When you have COPD (chronic obstructive pulmonary disease), certain things can make your symptoms worse. These are called triggers. They include:  · Cigarette smoke or air pollution. · Illnesses like colds, flu, or pneumonia. · Cleaning supplies or other chemicals. · Gases, particles, or fumes from wood or kerosene home heaters. Not all people have the same triggers. What may cause symptoms in one person may not be a problem for another person. How do triggers affect COPD? Triggers can make it harder for your lungs to work as they should and can lead to sudden difficulty breathing and other symptoms. When you are around a trigger, a COPD flare-up is more likely. If your symptoms are severe, you may need emergency treatment or have to go to the hospital for treatment. If you know what your triggers are and can avoid them, you can reduce how often you have flare-ups and how much COPD affects your life. It's also important to be active and to take your daily medicines as prescribed. This helps prevent flare-ups too. What can you do to avoid triggers? The first thing is to know your triggers. When you are having symptoms, note the things around you that might be causing them. Then look for patterns in what may be triggering your symptoms. When you have your list of possible triggers, work with your doctor to find ways to avoid them. Here are some ways to avoid a few common triggers. · Do not smoke or allow others to smoke around you. If you need help quitting, talk to your doctor about stop-smoking programs and medicines. These can increase your chances of quitting for good. · If there is a lot of pollution, pollen, or dust outside, stay at home and keep your windows closed. Use an air conditioner or air filter in your home. Check your local weather report or newspaper for air quality and pollen reports. · Get a flu vaccine every year.  Talk to your doctor about getting a pneumococcal shot. Wash your hands often to prevent infections. How can you manage a flare-up? Do not panic if you start to have a COPD flare-up. · If you have a COPD action plan, follow the plan. In general:  ¨ Use your quick-relief inhaler as directed by your doctor. If your symptoms do not get better after you use your medicine, have someone take you to the emergency room. Call an ambulance if needed. ¨ Use a spacer with your metered-dose inhaler (MDI). If you have a nebulizer for inhaled medicine, use it. A spacer or nebulizer may help get more medicine to your lungs. ¨ If your doctor has given you other inhaled medicines or steroid pills, take them as directed. ¨ If your doctor has given you a prescription for an antibiotic, fill it if you need to. ¨ Call your doctor if you have to use your antibiotic or steroid pills. Where can you learn more? Go to http://valentina-elvis.info/. Enter M254 in the search box to learn more about \"Learning About COPD Triggers. \"  Current as of: May 12, 2017  Content Version: 11.4  © 4216-1848 [employer]. Care instructions adapted under license by [employer] (which disclaims liability or warranty for this information). If you have questions about a medical condition or this instruction, always ask your healthcare professional. [address] any warranty or liability for your use of this information.

## 2018-06-24 NOTE — PROGRESS NOTES
Progress Note    Patient: Ayah Asencio MRN: [id number]  SSN: [id number]    YOB: 1955  Age: 58 y.o. Sex: female        Subjective:     Chief Complaint   Patient presents with    Cold Symptoms     cough- white sputum, congestion, shortness of breath at times, 3 weeks        HPI: she is a 58y.o. year old female new to me who presents with complaints of congestion, cough described as productive of white sputum and SOB for 3 weeks. no nausea and no vomiting. she has not had  sore throat, myalgias, headache and fever. Symptoms are moderate. There is a hx of asthma. There is a hx of allergic rhinitis. There is a hx of tobacco use. There have not been contacts with similar infections. She is followed by pain management and is on opioid medication. Wt Readings from Last 3 Encounters:   06/06/18 87 lb (39.5 kg)   05/08/18 85 lb 12.8 oz (38.9 kg)     Body mass index is 14.04 kg/(m^2). BP Readings from Last 3 Encounters:   06/06/18 131/78   05/08/18 131/60       Encounter Diagnoses   Name Primary?  Other emphysema ([facility] Yes    Cough     Underweight     Tobacco abuse, in remission        BP Readings from Last 3 Encounters:   06/20/18 118/72   06/06/18 131/78   05/08/18 131/60     Wt Readings from Last 3 Encounters:   06/20/18 86 lb 12.8 oz (39.4 kg)   06/06/18 87 lb (39.5 kg)   05/08/18 85 lb 12.8 oz (38.9 kg)     Body mass index is 14.04 kg/(m^2). Current and past medical information:    Current Medications after this visit[de-identified]     Current Outpatient Prescriptions   Medication Sig    ipratropium-albuterol (COMBIVENT RESPIMAT)  mcg/actuation inhaler USE 1 PUFF BY MOUTH EVERY 6 HOURS    predniSONE (DELTASONE) 10 mg tablet Take 4 tabs for three days, Then take 3 tabs for three days, Then take 2 tabs for three days, Then take 1 tab for three days.  benzonatate (TESSALON) 200 mg capsule Take 1 Cap by mouth three (3) times daily as needed for Cough.     zafirlukast (ACCOLATE) 10 mg tablet TAKE 1 TABLET BY MOUTH DAILY WITH LUNCH    ibandronate (BONIVA) 150 mg tablet Take 150 mg by mouth every thirty (30) days.  cyclobenzaprine (FLEXERIL) 10 mg tablet Take 1 Tab by mouth three (3) times daily as needed for Muscle Spasm(s).  oxyCODONE-acetaminophen (PERCOCET 10)  mg per tablet Take 1 Tab by mouth.  Lancets misc One Touch Lancets-Test blood sugar  daily for DM-2 (250.00)    glucose blood VI test strips (ONE TOUCH ULTRA TEST) strip Test blood sugar  daily for DM-2 (250.00)    Nebulizer & Compressor machine For use as breathing treatment every 4 - 6 hours as needed.  Nebulizer Accessories kit For use as breathing treatment every 4 - 6 hours as needed.  albuterol-ipratropium (DUO-NEB) 2.5 mg-0.5 mg/3 ml nebu 3 mL by Nebulization route every four (4) hours as needed. No current facility-administered medications for this visit.         Patient Active Problem List    Diagnosis Date Noted    Back muscle spasm 06/28/2016    Depression 09/19/2014    Anorexia 09/18/2014    Diabetes mellitus type 2, controlled ([facility] 06/22/2012    Back pain 07/15/2011    Anxiety 02/09/2011    Tobacco abuse 12/06/2010    Asthma 12/06/2010    COPD (chronic obstructive pulmonary disease) ([facility] 12/06/2010    Hyperlipemia 06/16/2010    Neuropathy     Fibromyalgia     MS (multiple sclerosis) ([facility]        Past Medical History:   Diagnosis Date    Anxiety     Asthma 12/6/2010    COPD (chronic obstructive pulmonary disease) ([facility] 12/6/2010    Depression 9/19/2014    [facility]     Fibromyalgia     Hyperlipemia 6/16/2010    MS (multiple sclerosis) (HCC)     Neuropathy     Syncope        Allergies   Allergen Reactions    Evista [Raloxifene] Diarrhea and Swelling     Makes throat swell     Doxycycline Swelling     Tongue      Pcn [Penicillins] Swelling     Of the tongue    Spiriva With Handihaler [Tiotropium Bromide] Other (comments)     Chest pain        Past Surgical History: Procedure Laterality Date    HX APPENDECTOMY         Social History     Social History    Marital status:      Spouse name: N/A    Number of children: N/A    Years of education: N/A     Social History Main Topics    Smoking status: Former Smoker     Packs/day: 0.50     Years: 40.00     Quit date: 5/14/2018    Smokeless tobacco: Never Used      Comment: states has been trying to stop-given stop smoking information    Alcohol use No    Drug use: No    Sexual activity: Not Asked     Other Topics Concern    None     Social History Narrative         Objective:     Review of Systems:  Constitutional: Negative for fatigue or malaise  Derm: Negative for rash or lesion  HEENT: Negative for acute hearing or vision changes  Cardiovascular: Negative for dizziness, chest pain or palpitations  Respiratory: see HPI  Gastreintestinal: Negative for nausea or abdominal pain  Genital/urinary: Negative for dysuria or voiding dysfunction  Muscoloskeletal: see HPI, Negative for acute myalgias or arthralgias   Neurological: Negative for headache, weakness or paresthesia  Psychological: Negative for depression or anxiety      Vitals:    06/06/18 1430   BP: 131/78   Pulse: 84   Resp: 22   Temp: 97.6 °F (36.4 °C)   TempSrc: Oral   SpO2: 95%   Weight: 87 lb (39.5 kg)   Height: 5' 6\" (1.676 m)      Body mass index is 14.04 kg/(m^2). Physical Exam:  Constitutional: thin, in no acute distress  Skin: warm and dry, normal tone and turgor  Head: normocephalic, atraumatic  Eyes: sclera clear, EOMI, PERRL  Neck: normal range of motion  CV: normal S1, S2, regular rate and rhythm  Respiratory: diminished airflow, clear to auscultation bilaterally with symmetrical effort  Extremities: full range of motion  Neurology: normal strength and sensation  Psych: active, alert and oriented, affect appropriate       Assessment and orders:     Diagnoses and all orders for this visit:    1.  Other emphysema ([address]  -     ipratropium-albuterol (COMBIVENT RESPIMAT)  mcg/actuation inhaler; USE 1 PUFF BY MOUTH EVERY 6 HOURS  -     predniSONE (DELTASONE) 10 mg tablet; Take 4 tabs for three days, Then take 3 tabs for three days, Then take 2 tabs for three days, Then take 1 tab for three days. 2. Cough  -     benzonatate (TESSALON) 200 mg capsule; Take 1 Cap by mouth three (3) times daily as needed for Cough. 3. Underweight    4. Tobacco abuse, in remission        Plan of care:  Diagnoses were discussed in detail with patient. Discussed concern about cancer and need for all available screenings. Advised to make well woman appointment. Strongly advised patient to continue cessaton of tobacco products. Medication risks/benefits/side effects discussed with patient. All of the patient's questions were addressed. The patient understands and agrees with our plan of care. The patient knows to call back if they are unsure of or forgets any changes we discussed today or if the symptoms change. The patient received an After-Visit Summary which contains VS, orders, allergy and medication lists.        Patient Care Team:  Jacob Flores MD as Physician (Gastroenterology)  Cleveland Degroot MD [facility])    Follow-up Disposition: Not on File    Future Appointments  Date Time Provider Federico Mónica   7/20/2018 1:30 PM Cleveland Degroot MD [facility]       Signed By: Cleveland Degroot MD     June 24, 2018

## 2018-06-28 NOTE — PROGRESS NOTES
This is the Subsequent Medicare Annual Wellness Exam, performed 12 months or more after the Initial AWV or the last Subsequent AWV    I have reviewed the patient's medical history in detail and updated the computerized patient record. History     Past Medical History:   Diagnosis Date    Anxiety     Asthma 12/6/2010    COPD (chronic obstructive pulmonary disease) ([facility] 12/6/2010    Depression 9/19/2014    Diabetes ([facility]     Fibromyalgia     Hyperlipemia 6/16/2010    MS (multiple sclerosis) (HCC)     Neuropathy     Syncope       Past Surgical History:   Procedure Laterality Date    HX APPENDECTOMY       Current Outpatient Prescriptions   Medication Sig Dispense Refill    Nebulizer & Compressor machine For use as breathing treatment every 4 - 6 hours as needed. 1 Each 0    Nebulizer Accessories kit For use as breathing treatment every 4 - 6 hours as needed. 1 Kit 0    albuterol-ipratropium (DUO-NEB) 2.5 mg-0.5 mg/3 ml nebu 3 mL by Nebulization route every four (4) hours as needed. 90 Nebule 1    ipratropium-albuterol (COMBIVENT RESPIMAT)  mcg/actuation inhaler USE 1 PUFF BY MOUTH EVERY 6 HOURS 4 Each 3    predniSONE (DELTASONE) 10 mg tablet Take 4 tabs for three days, Then take 3 tabs for three days, Then take 2 tabs for three days, Then take 1 tab for three days. 30 Tab 0    benzonatate (TESSALON) 200 mg capsule Take 1 Cap by mouth three (3) times daily as needed for Cough. 30 Cap 2    zafirlukast (ACCOLATE) 10 mg tablet TAKE 1 TABLET BY MOUTH DAILY WITH LUNCH 90 Tab 1    ibandronate (BONIVA) 150 mg tablet Take 150 mg by mouth every thirty (30) days. 3 Tab 3    cyclobenzaprine (FLEXERIL) 10 mg tablet Take 1 Tab by mouth three (3) times daily as needed for Muscle Spasm(s).  20 Tab 1    oxyCODONE-acetaminophen (PERCOCET 10)  mg per tablet Take 1 Tab by mouth.  0    Lancets misc One Touch Lancets-Test blood sugar  daily for DM-2 (250.00) 1 Package 11    glucose blood VI test strips (ONE TOUCH ULTRA TEST) strip Test blood sugar  daily for DM-2 (250.00) 100 strip 11     Allergies   Allergen Reactions    Evista [Raloxifene] Diarrhea and Swelling     Makes throat swell     Doxycycline Swelling     Tongue      Pcn [Penicillins] Swelling     Of the tongue    Spiriva With Handihaler [Tiotropium Bromide] Other (comments)     Chest pain      Family History   Problem Relation Age of Onset    Hypertension Father     Alcohol abuse Father     Kidney Disease Mother     Lung Disease Sister      COPD    Arthritis-rheumatoid Neg Hx     Asthma Neg Hx     Bleeding Prob Neg Hx     Cancer Neg Hx     Diabetes Neg Hx     Elevated Lipids Neg Hx     Headache Neg Hx     Heart Disease Neg Hx     Migraines Neg Hx     Psychiatric Disorder Neg Hx     Stroke Neg Hx     Mental Retardation Neg Hx     Anesth Problems Neg Hx      Social History   Substance Use Topics    Smoking status: Former Smoker     Packs/day: 0.50     Years: 40.00     Quit date: 5/14/2018    Smokeless tobacco: Never Used      Comment: states has been trying to stop-given stop smoking information    Alcohol use No     Patient Active Problem List   Diagnosis Code    Neuropathy G62.9    Fibromyalgia M79.7    MS (multiple sclerosis) ([facility] G35    Hyperlipemia E78.5    Tobacco abuse Z72.0    Asthma J45.909    COPD (chronic obstructive pulmonary disease) ([facility] J44.9    Anxiety F41.9    Back pain M54.9    Diabetes mellitus type 2, controlled ([facility] E11.9    Anorexia R63.0    Depression F32.9    Back muscle spasm M62.830       Depression Risk Factor Screening:     PHQ over the last two weeks 5/21/2014   Little interest or pleasure in doing things Not at all   Feeling down, depressed or hopeless Not at all   Total Score PHQ 2 0     Alcohol Risk Factor Screening: You do not drink alcohol or very rarely. Functional Ability and Level of Safety:   Hearing Loss  Hearing is good.     Activities of Daily Living  The home contains: no safety equipment. Patient does total self care    Fall Risk  Fall Risk Assessment, last 12 mths 5/21/2014   Able to walk? Yes   Fall in past 12 months? No       Abuse Screen  Patient is not abused    Cognitive Screening   Evaluation of Cognitive Function:  Has your family/caregiver stated any concerns about your memory: no  Normal    Patient Care Team   Patient Care Team:  Amanda Patel MD as Physician (Gastroenterology)  Simeon Shine MD (Family Practice)    Assessment/Plan   Education and counseling provided:  Are appropriate based on today's review and evaluation  End-of-Life planning (with patient's consent)    Diagnoses and all orders for this visit:    1. Medicare annual wellness visit, subsequent    2. Screening for depression  -     Depression Screen Annual    3. Screening for alcoholism  -     Annual  Alcohol Screen 15 min ()        Health Maintenance Due   Topic Date Due    DTaP/Tdap/Td series (1 - Tdap) 07/15/1976    ZOSTER VACCINE AGE 60>  05/15/2015    BREAST CANCER SCRN MAMMOGRAM  07/24/2015    EYE EXAM RETINAL OR DILATED Q1  08/06/2016    PAP AKA CERVICAL CYTOLOGY  10/22/2017    FOOT EXAM Q1  10/24/2017    MEDICARE YEARLY EXAM  03/14/2018       The following medical evaluation and decision making is distinct and separate from the Annual Medicare Wellness Exam.    Progress Note    Patient: Ema Paredes MRN: [id number]  SSN: [id number]    YOB: 1955  Age: 58 y.o. Sex: female        Subjective:     Chief Complaint   Patient presents with    Breathing Problem    Annual Wellness Visit    Cough       HPI: she is a 58y.o. year old female  who presents with continued complaints of congestion, cough described as productive of white sputum and SOB. There is a hx of asthma. There is a hx of allergic rhinitis. There is a hx of tobacco use. She is followed by pain management and is on opioid medication.     Wt Readings from Last 3 Encounters:   06/20/18 86 lb 12.8 oz (39.4 kg)   06/06/18 87 lb (39.5 kg)   05/08/18 85 lb 12.8 oz (38.9 kg)     Body mass index is 14.01 kg/(m^2). BP Readings from Last 3 Encounters:   06/20/18 118/72   06/06/18 131/78   05/08/18 131/60         Encounter Diagnoses   Name Primary?  Other emphysema ([address] Yes    Medicare annual wellness visit, subsequent     Screening for depression     Screening for alcoholism        BP Readings from Last 3 Encounters:   06/20/18 118/72   06/06/18 131/78   05/08/18 131/60     Wt Readings from Last 3 Encounters:   06/20/18 86 lb 12.8 oz (39.4 kg)   06/06/18 87 lb (39.5 kg)   05/08/18 85 lb 12.8 oz (38.9 kg)     Body mass index is 14.01 kg/(m^2). Current and past medical information:    Current Medications after this visit[de-identified]     Current Outpatient Prescriptions   Medication Sig    Nebulizer & Compressor machine For use as breathing treatment every 4 - 6 hours as needed.  Nebulizer Accessories kit For use as breathing treatment every 4 - 6 hours as needed.  albuterol-ipratropium (DUO-NEB) 2.5 mg-0.5 mg/3 ml nebu 3 mL by Nebulization route every four (4) hours as needed.  ipratropium-albuterol (COMBIVENT RESPIMAT)  mcg/actuation inhaler USE 1 PUFF BY MOUTH EVERY 6 HOURS    predniSONE (DELTASONE) 10 mg tablet Take 4 tabs for three days, Then take 3 tabs for three days, Then take 2 tabs for three days, Then take 1 tab for three days.  benzonatate (TESSALON) 200 mg capsule Take 1 Cap by mouth three (3) times daily as needed for Cough.  zafirlukast (ACCOLATE) 10 mg tablet TAKE 1 TABLET BY MOUTH DAILY WITH LUNCH    ibandronate (BONIVA) 150 mg tablet Take 150 mg by mouth every thirty (30) days.  cyclobenzaprine (FLEXERIL) 10 mg tablet Take 1 Tab by mouth three (3) times daily as needed for Muscle Spasm(s).  oxyCODONE-acetaminophen (PERCOCET 10)  mg per tablet Take 1 Tab by mouth.     Lancets misc One Touch Lancets-Test blood sugar  daily for DM-2 (250.00)    glucose blood VI test strips (ONE TOUCH ULTRA TEST) strip Test blood sugar  daily for DM-2 (250.00)     No current facility-administered medications for this visit.         Patient Active Problem List    Diagnosis Date Noted    Back muscle spasm 06/28/2016    Depression 09/19/2014    Anorexia 09/18/2014    Diabetes mellitus type 2, controlled ([facility] 06/22/2012    Back pain 07/15/2011    Anxiety 02/09/2011    Tobacco abuse 12/06/2010    Asthma 12/06/2010    COPD (chronic obstructive pulmonary disease) ([facility] 12/06/2010    Hyperlipemia 06/16/2010    Neuropathy     Fibromyalgia     [facility]multiple sclerosis) ([facility]        Past Medical History:   Diagnosis Date    Anxiety     Asthma 12/6/2010    COPD (chronic obstructive pulmonary disease) ([facility] 12/6/2010    Depression 9/19/2014    Diabetes ([facility]     Fibromyalgia     Hyperlipemia 6/16/2010    MS (multiple sclerosis) ([facility])     Neuropathy     Syncope        Allergies   Allergen Reactions    Evista [Raloxifene] Diarrhea and Swelling     Makes throat swell     Doxycycline Swelling     Tongue      Pcn [Penicillins] Swelling     Of the tongue    Spiriva With Handihaler [Tiotropium Bromide] Other (comments)     Chest pain        Past Surgical History:   Procedure Laterality Date    HX APPENDECTOMY         Social History     Social History    Marital status:      Spouse name: N/A    Number of children: N/A    Years of education: N/A     Social History Main Topics    Smoking status: Former Smoker     Packs/day: 0.50     Years: 40.00     Quit date: 5/14/2018    Smokeless tobacco: Never Used      Comment: states has been trying to stop-given stop smoking information    Alcohol use No    Drug use: No    Sexual activity: Not Asked     Other Topics Concern    None     Social History Narrative         Objective:     Review of Systems:  Constitutional: Negative for fatigue or malaise  Derm: Negative for rash or lesion  HEENT: Negative for acute hearing or vision changes  Cardiovascular: Negative for dizziness, chest pain or palpitations  Respiratory: see HPI  Gastreintestinal: Negative for nausea or abdominal pain  Genital/urinary: Negative for dysuria or voiding dysfunction  Muscoloskeletal: see HPI, Negative for acute myalgias or arthralgias   Neurological: Negative for headache, weakness or paresthesia  Psychological: Negative for depression or anxiety      Vitals:    06/20/18 1458   BP: 118/72   Pulse: 83   Resp: 18   Temp: 97.6 °F (36.4 °C)   TempSrc: Oral   SpO2: 94%   Weight: 86 lb 12.8 oz (39.4 kg)   Height: 5' 6\" (1.676 m)      Body mass index is 14.01 kg/(m^2). Physical Exam:  Constitutional: thin, in no acute distress  Skin: warm and dry, normal tone and turgor  Head: normocephalic, atraumatic  Eyes: sclera clear, EOMI, PERRL  Neck: normal range of motion  CV: normal S1, S2, regular rate and rhythm  Respiratory: diminished airflow, clear to auscultation bilaterally with symmetrical effort  Extremities: full range of motion  Neurology: normal strength and sensation  Psych: active, alert and oriented, affect appropriate       Assessment and orders:     Diagnoses and all orders for this visit:    1. Other emphysema ([address]  -     Nebulizer & Compressor machine; For use as breathing treatment every 4 - 6 hours as needed. -     Nebulizer Accessories kit; For use as breathing treatment every 4 - 6 hours as needed. -     albuterol-ipratropium (DUO-NEB) 2.5 mg-0.5 mg/3 ml nebu; 3 mL by Nebulization route every four (4) hours as needed. Plan of care:  Diagnoses were discussed in detail with patient. Discussed concern about cancer and need for all available screenings. Advised to make well woman appointment. Strongly advised patient to continue cessaton of tobacco products. Medication risks/benefits/side effects discussed with patient. All of the patient's questions were addressed. The patient understands and agrees with our plan of care.   The patient knows to call back if they are unsure of or forgets any changes we discussed today or if the symptoms change. The patient received an After-Visit Summary which contains VS, orders, allergy and medication lists.        Patient Care Team:  Sarah Mcpherson MD as Physician (Gastroenterology)  Sivan Ramirez MD [facility])    Follow-up Disposition: Not on File    Future Appointments  Date Time Provider Federico Mónica   7/20/2018 1:30 PM Sivan Ramirez MD [facility]       Signed By: Sivan Ramirez MD     June 28, 2018

## 2018-07-05 ENCOUNTER — TELEPHONE (OUTPATIENT)
Dept: FAMILY MEDICINE CLINIC | Age: 63
End: 2018-07-05

## 2018-07-05 NOTE — TELEPHONE ENCOUNTER
Pt called stating that she never received her nebulizer. She is requesting a call back. She would like to know the reason why she have not received it.

## 2018-07-05 NOTE — TELEPHONE ENCOUNTER
Please call Pt about her visits and her insurance. She does not know what is going on with her insurance.

## 2018-07-05 NOTE — TELEPHONE ENCOUNTER
Pain Mgt called stating that pt advised them that Dr. Heather Rucker put in an order to have a bone density done and would like a copy of it. I looked through chart and was unable to find anything.      Phone number: [phone]  Choose Opt 1 then opt 2    Fax number: [phone]

## 2018-07-05 NOTE — TELEPHONE ENCOUNTER
Patient states her medications are getting denied and she doesn't understand why. She states she called [facility] and they \"have no prescriptions from Dr. Isaura Walker on file\" and that she believes Dr. Isaura Walker is sending stuff to the wrong insurance company. I advised her Dr. Isaura Walker does not handle billing or anything to do with the insurance company, the coders do. She is requesting someone call her and help her understand why her medications and lung cancer screening are denied when the insurance company says it is approved.

## 2018-07-05 NOTE — TELEPHONE ENCOUNTER
Notified pain management that Dr. Harrington Lesch did not order a bone density scan but if one is needed he can order it.  stated she will relay the message.

## 2018-07-05 NOTE — TELEPHONE ENCOUNTER
Made contact with [facility] who states she could not locate the request. She gave me a direct fax number and stated she would call me back if she did not receive it.

## 2018-07-09 NOTE — TELEPHONE ENCOUNTER
Dilip Erickson MD   You 14 hours ago (5:30 PM)                 Medication prescriptions have been sent to [facility]. The pharmacy can determine whether medications are on patient's health insurance formulary. We do not send these to the insurance company.     Screening CT was ordered with both primary and secondary insurance companies listed.    I can try a non-screening CT to see if that is approved.    (Routing comment)

## 2018-07-20 ENCOUNTER — OFFICE VISIT (OUTPATIENT)
Dept: FAMILY MEDICINE CLINIC | Age: 63
End: 2018-07-20

## 2018-07-20 VITALS
WEIGHT: 86.8 LBS | HEART RATE: 90 BPM | OXYGEN SATURATION: 93 % | DIASTOLIC BLOOD PRESSURE: 68 MMHG | HEIGHT: 66 IN | TEMPERATURE: 98.3 F | BODY MASS INDEX: 13.95 KG/M2 | SYSTOLIC BLOOD PRESSURE: 125 MMHG | RESPIRATION RATE: 24 BRPM

## 2018-07-20 DIAGNOSIS — J43.8 OTHER EMPHYSEMA (HCC): Primary | ICD-10-CM

## 2018-07-20 RX ORDER — IPRATROPIUM BROMIDE AND ALBUTEROL SULFATE 2.5; .5 MG/3ML; MG/3ML
3 SOLUTION RESPIRATORY (INHALATION)
Qty: 90 NEBULE | Refills: 1 | Status: SHIPPED | OUTPATIENT
Start: 2018-07-20

## 2018-07-20 NOTE — PROGRESS NOTES
1. Have you been to the ER, urgent care clinic, or been hospitalized since your last visit? No    2. Have you seen or consulted any other health care providers outside of the [address] [place] [address] since your last visit? Include any pap smears or colon screening.     Reviewed record in preparation for visit and have necessary documentation  Pt did not bring medication to office visit for review  Information was given to pt on Advanced Directives, Living Will  opportunity was given for questions  Goals that were addressed and/or need to be completed during or after this appointment include       Health Maintenance Due   Topic Date Due    DTaP/Tdap/Td series (1 - Tdap) 07/15/1976    ZOSTER VACCINE AGE 60>  05/15/2015    BREAST CANCER SCRN MAMMOGRAM  07/24/2015    EYE EXAM RETINAL OR DILATED Q1  08/06/2016    PAP AKA CERVICAL CYTOLOGY  10/22/2017    FOOT EXAM Q1  10/24/2017

## 2018-07-30 NOTE — PROGRESS NOTES
Progress Note    Patient: Radha Martinez MRN: [id number]  SSN: [id number]    YOB: 1955  Age: 61 y.o. Sex: female        Subjective:     Chief Complaint   Patient presents with    Shortness of Breath     93% O2    Follow-up       HPI: she is a 61y.o. year old female  who presents with complaints of SOB. She has not been able to get nebulizer. There is a hx of asthma, COPD,  allergic rhinitis and tobacco use. She is followed by pain management and is on opioid medication. Encounter Diagnoses   Name Primary?  Other emphysema ([facility] Yes       BP Readings from Last 3 Encounters:   07/20/18 125/68   06/20/18 118/72   06/06/18 131/78     Wt Readings from Last 3 Encounters:   07/20/18 86 lb 12.8 oz (39.4 kg)   06/20/18 86 lb 12.8 oz (39.4 kg)   06/06/18 87 lb (39.5 kg)     Body mass index is 14.01 kg/(m^2). Current and past medical information:    Current Medications after this visit[de-identified]     Current Outpatient Prescriptions   Medication Sig    albuterol-ipratropium (DUO-NEB) 2.5 mg-0.5 mg/3 ml nebu 3 mL by Nebulization route every four (4) hours as needed.  mometasone-formoterol (DULERA) 100-5 mcg/actuation HFA inhaler Take 2 Puffs by inhalation two (2) times a day.  ipratropium-albuterol (COMBIVENT RESPIMAT)  mcg/actuation inhaler USE 1 PUFF BY MOUTH EVERY 6 HOURS    benzonatate (TESSALON) 200 mg capsule Take 1 Cap by mouth three (3) times daily as needed for Cough.  zafirlukast (ACCOLATE) 10 mg tablet TAKE 1 TABLET BY MOUTH DAILY WITH LUNCH    ibandronate (BONIVA) 150 mg tablet Take 150 mg by mouth every thirty (30) days.  cyclobenzaprine (FLEXERIL) 10 mg tablet Take 1 Tab by mouth three (3) times daily as needed for Muscle Spasm(s).  oxyCODONE-acetaminophen (PERCOCET 10)  mg per tablet Take 1 Tab by mouth.     Lancets misc One Touch Lancets-Test blood sugar  daily for DM-2 (250.00)    glucose blood VI test strips (ONE TOUCH ULTRA TEST) strip Test blood sugar daily for DM-2 (250.00)    Nebulizer & Compressor machine For use as breathing treatment every 4 - 6 hours as needed.  Nebulizer Accessories kit For use as breathing treatment every 4 - 6 hours as needed. No current facility-administered medications for this visit.         Patient Active Problem List    Diagnosis Date Noted    Back muscle spasm 06/28/2016    Depression 09/19/2014    Anorexia 09/18/2014    Diabetes mellitus type 2, controlled ([facility] 06/22/2012    Back pain 07/15/2011    Anxiety 02/09/2011    Tobacco abuse 12/06/2010    Asthma 12/06/2010    COPD (chronic obstructive pulmonary disease) ([facility] 12/06/2010    Hyperlipemia 06/16/2010    Neuropathy     Fibromyalgia     [facility] sclerosis[facility] ([facility]        Past Medical History:   Diagnosis Date    Anxiety     Asthma 12/6/2010    COPD (chronic obstructive pulmonary disease) ([facility] 12/6/2010    Depression 9/19/2014    [facility] ([facility]     Fibromyalgia     Hyperlipemia 6/16/2010    MS (multiple sclerosis) ([facility])     Neuropathy     Syncope        Allergies   Allergen Reactions    Evista [Raloxifene] Diarrhea and Swelling     Makes throat swell     Doxycycline Swelling     Tongue      Pcn [Penicillins] Swelling     Of the tongue    Spiriva With Handihaler [Tiotropium Bromide] Other (comments)     Chest pain        Past Surgical History:   Procedure Laterality Date    HX APPENDECTOMY         Social History     Social History    Marital status:      Spouse name: N/A    Number of children: N/A    Years of education: N/A     Social History Main Topics    Smoking status: Former Smoker     Packs/day: 0.50     Years: 40.00     Quit date: 5/14/2018    Smokeless tobacco: Never Used      Comment: states has been trying to stop-given stop smoking information    Alcohol use No    Drug use: No    Sexual activity: Not Asked     Other Topics Concern    None     Social History Narrative         Objective:     Review of Systems:  Constitutional: Negative for fatigue or malaise  Derm: Negative for rash or lesion  HEENT: Negative for acute hearing or vision changes  Cardiovascular: Negative for dizziness, chest pain or palpitations  Respiratory: see HPI  Gastreintestinal: Negative for nausea or abdominal pain  Genital/urinary: Negative for dysuria or voiding dysfunction  Muscoloskeletal: see HPI, Negative for acute myalgias or arthralgias   Neurological: Negative for headache, weakness or paresthesia  Psychological: Negative for depression or anxiety      Vitals:    07/20/18 1309   BP: 125/68   Pulse: 90   Resp: 24   Temp: 98.3 °F (36.8 °C)   TempSrc: Oral   SpO2: 93%   Weight: 86 lb 12.8 oz (39.4 kg)   Height: 5' 6\" (1.676 m)      Body mass index is 14.01 kg/(m^2). Physical Exam:  Constitutional: thin, in no acute distress  Skin: warm and dry, normal tone and turgor  Head: normocephalic, atraumatic  Eyes: sclera clear, EOMI  Neck: normal range of motion  CV: normal S1, S2, regular rate and rhythm  Respiratory: diminished airflow, clear to auscultation bilaterally with symmetrical effort  Extremities: full range of motion  Neurology: normal strength and sensation  Psych: active, alert and oriented, affect appropriate       Assessment and orders:     Diagnoses and all orders for this visit:    1. Other emphysema ([address]  -     albuterol-ipratropium (DUO-NEB) 2.5 mg-0.5 mg/3 ml nebu; 3 mL by Nebulization route every four (4) hours as needed. -     XR CHEST PA LAT; Future  -     mometasone-formoterol (DULERA) 100-5 mcg/actuation HFA inhaler; Take 2 Puffs by inhalation two (2) times a day. Plan of care:  Diagnoses were discussed in detail with patient. Advised to make well woman appointment. Strongly advised patient to continue cessaton of tobacco products. Medication risks/benefits/side effects discussed with patient. All of the patient's questions were addressed.    The patient understands and agrees with our plan of care.  The patient knows to call back if they are unsure of or forgets any changes we discussed today or if the symptoms change. The patient received an After-Visit Summary which contains VS, orders, allergy and medication lists. Patient Care Team:  Jeferson Wu MD as Physician (Gastroenterology)  Aleksandar Stanford MD (Family Practice)    Follow-up Disposition:  Return in about 3 months (around 10/20/2018), or if symptoms worsen or fail to improve. No future appointments.     Signed By: Aleksandar Stanford MD     July 29, 2018

## 2019-03-14 DIAGNOSIS — J43.8 OTHER EMPHYSEMA (HCC): ICD-10-CM

## 2019-03-14 RX ORDER — ZAFIRLUKAST 10 MG/1
TABLET, FILM COATED ORAL
Qty: 90 TAB | Refills: 0 | Status: SHIPPED | OUTPATIENT
Start: 2019-03-14 | End: 2019-03-26 | Stop reason: SDUPTHER

## 2019-03-14 RX ORDER — MOMETASONE FUROATE AND FORMOTEROL FUMARATE DIHYDRATE 100; 5 UG/1; UG/1
AEROSOL RESPIRATORY (INHALATION)
Qty: 1 INHALER | Refills: 2 | Status: SHIPPED | OUTPATIENT
Start: 2019-03-14 | End: 2019-03-26 | Stop reason: ALTCHOICE

## 2019-03-14 NOTE — LETTER
3/15/2019 8:17 AM 
 
Ms. Ana Encarnacion [address] [place] [id number] [facility] [address] Dear Ms. Raeann Mijares missed you! Please call our office at [phone] and schedule a follow up appointment for your continued care. Please schedule follow up appointment before any further refills. Thank you! Sincerely, Boston Palomares MD

## 2019-03-15 DIAGNOSIS — J43.8 OTHER EMPHYSEMA (HCC): ICD-10-CM

## 2019-03-15 RX ORDER — IBANDRONATE SODIUM 150 MG/1
TABLET, FILM COATED ORAL
Qty: 3 TAB | Refills: 3 | Status: CANCELLED | OUTPATIENT
Start: 2019-03-15

## 2019-03-26 ENCOUNTER — OFFICE VISIT (OUTPATIENT)
Dept: FAMILY MEDICINE CLINIC | Age: 64
End: 2019-03-26

## 2019-03-26 VITALS
OXYGEN SATURATION: 95 % | RESPIRATION RATE: 20 BRPM | TEMPERATURE: 97.5 F | HEIGHT: 66 IN | WEIGHT: 84.4 LBS | DIASTOLIC BLOOD PRESSURE: 79 MMHG | HEART RATE: 88 BPM | SYSTOLIC BLOOD PRESSURE: 146 MMHG | BODY MASS INDEX: 13.56 KG/M2

## 2019-03-26 DIAGNOSIS — R63.6 UNDERWEIGHT: ICD-10-CM

## 2019-03-26 DIAGNOSIS — F17.210 SMOKING GREATER THAN 30 PACK YEARS: ICD-10-CM

## 2019-03-26 DIAGNOSIS — J43.8 OTHER EMPHYSEMA (HCC): Primary | ICD-10-CM

## 2019-03-26 DIAGNOSIS — R05.9 COUGH: ICD-10-CM

## 2019-03-26 RX ORDER — BENZONATATE 200 MG/1
200 CAPSULE ORAL
Qty: 30 CAP | Refills: 2 | Status: SHIPPED | OUTPATIENT
Start: 2019-03-26 | End: 2019-07-22

## 2019-03-26 RX ORDER — ZAFIRLUKAST 10 MG/1
10 TABLET, FILM COATED ORAL 2 TIMES DAILY
Qty: 180 TAB | Refills: 0 | Status: SHIPPED | OUTPATIENT
Start: 2019-03-26 | End: 2019-06-18 | Stop reason: SDUPTHER

## 2019-03-26 NOTE — PROGRESS NOTES
Chief Complaint Patient presents with  Breathing Problem Visit Vitals /79 (BP 1 Location: Right arm, BP Patient Position: Sitting) Pulse 88 Temp 97.5 °F (36.4 °C) (Oral) Resp 20 Ht 5' 6\" (1.676 m) Wt 84 lb 6.4 oz (38.3 kg) SpO2 95% BMI 13.62 kg/m² 1. Have you been to the ER, urgent care clinic since your last visit? Hospitalized since your last visit? No 
 
2. Have you seen or consulted any other health care providers outside of the [address] [place] [address] since your last visit? Include any pap smears or colon screening. No 
 
Reviewed record in preparation for visit and have necessary documentation Pt did not bring medication to office visit for review 
opportunity was given for questions Goals that were addressed and/or need to be completed during or after this appointment include Health Maintenance Due Topic Date Due  
 DTaP/Tdap/Td series (1 - Tdap) 07/15/1976  Shingrix Vaccine Age 50> (1 of 2) 07/15/2005  BREAST CANCER SCRN MAMMOGRAM  07/24/2015  
 EYE EXAM RETINAL OR DILATED  08/06/2017  PAP AKA CERVICAL CYTOLOGY  10/22/2017  
 FOOT EXAM Q1  10/24/2017  Influenza Age 5 to Adult  08/01/2018  HEMOGLOBIN A1C Q6M  09/09/2018  MICROALBUMIN Q1  03/09/2019  LIPID PANEL Q1  03/09/2019

## 2019-03-27 NOTE — ASSESSMENT & PLAN NOTE
Key Antihyperglycemic Medications Patient is on no antihyperglycemic meds. Other Key Diabetic Medications Patient is on no other key diabetic meds. Lab Results Component Value Date/Time Hemoglobin A1c 5.6 03/09/2018 11:11 AM  
 Glucose 104 03/09/2018 11:11 AM  
 Creatinine 0.57 03/09/2018 11:11 AM  
 Microalbumin/creat ratio (POC) <30 03/09/2018 03:41 PM  
 Cholesterol, total 237 03/09/2018 11:11 AM  
 HDL Cholesterol 104 03/09/2018 11:11 AM  
 LDL, calculated 115 03/09/2018 11:11 AM  
 Triglyceride 89 03/09/2018 11:11 AM  
 
Diabetic Foot and Eye Exam HM Status Topic Date Due  
 Diabetic Foot Care  07/01/2019 (Originally 10/24/2017)  Eye Exam  07/01/2019 (Originally 8/6/2017)

## 2019-03-27 NOTE — PROGRESS NOTES
Progress Note Patient: Vida Moseley MRN: [id number]  SSN: [id number] YOB: 1955  Age: 61 y.o. Sex: female Subjective: Chief Complaint Patient presents with  Breathing Problem HPI: she is a 61y.o. year old female  who presents with complaints of SOB. She says this is worsening. Ambulatory SpO2 90%. There is a hx of asthma, COPD, allergic rhinitis and tobacco use. Patient relies on Combivent for her symptoms. She is followed by pain management and is on opioid medication. Patient denies HA, dizziness, CP, abdominal pain, dysuria, weakness or paresthesia. BP elevated. She is underweight. Encounter Diagnoses Name Primary?  Other emphysema ([facility] [address] Yes  Cough  Underweight  Smoking greater than 30 pack years BP Readings from Last 3 Encounters:  
03/26/19 146/79  
07/20/18 125/68  
06/20/18 118/72 Wt Readings from Last 3 Encounters:  
03/26/19 84 lb 6.4 oz (38.3 kg) 07/20/18 86 lb 12.8 oz (39.4 kg) 06/20/18 86 lb 12.8 oz (39.4 kg) Body mass index is 13.62 kg/m². Current and past medical information: 
 
Current Medications after this visit[de-identified]    
Current Outpatient Medications Medication Sig  
 zafirlukast (ACCOLATE) 10 mg tablet Take 1 Tab by mouth two (2) times a day.  ipratropium-albuterol (COMBIVENT RESPIMAT)  mcg/actuation inhaler USE 1 PUFF BY MOUTH EVERY 6 HOURS  
 benzonatate (TESSALON) 200 mg capsule Take 1 Cap by mouth three (3) times daily as needed for Cough.  salmeterol (SEREVENT) 50 mcg/dose dsdv Take 1 Puff by inhalation two (2) times a day.  ibandronate (BONIVA) 150 mg tablet Take 150 mg by mouth every thirty (30) days.  albuterol-ipratropium (DUO-NEB) 2.5 mg-0.5 mg/3 ml nebu 3 mL by Nebulization route every four (4) hours as needed.  Nebulizer & Compressor machine For use as breathing treatment every 4 - 6 hours as needed.   
 Nebulizer Accessories kit For use as breathing treatment every 4 - 6 hours as needed.  cyclobenzaprine (FLEXERIL) 10 mg tablet Take 1 Tab by mouth three (3) times daily as needed for Muscle Spasm(s).  oxyCODONE-acetaminophen (PERCOCET 10)  mg per tablet Take 1 Tab by mouth.  Lancets misc One Touch Lancets-Test blood sugar  daily for DM-2 (250.00)  glucose blood VI test strips (ONE TOUCH ULTRA TEST) strip Test blood sugar  daily for DM-2 (250.00)  ipratropium-albuterol (COMBIVENT RESPIMAT)  mcg/actuation inhaler USE 1 PUFF BY MOUTH EVERY 6 HOURS No current facility-administered medications for this visit. Patient Active Problem List  
 Diagnosis Date Noted  Back muscle spasm 06/28/2016  Depression 09/19/2014  Anorexia 09/18/2014  Back pain 07/15/2011  Anxiety 02/09/2011  Tobacco abuse 12/06/2010  Asthma 12/06/2010  COPD (chronic obstructive pulmonary disease) ([facility] [address] 12/06/2010  Hyperlipemia 06/16/2010  Neuropathy  Fibromyalgia  MS (multiple sclerosis) ([facility] [address] Past Medical History:  
Diagnosis Date  Anxiety  Asthma 12/6/2010  COPD (chronic obstructive pulmonary disease) ([facility] [address] 12/6/2010  Depression 9/19/2014  Fibromyalgia  Hyperlipemia 6/16/2010  MS (multiple sclerosis) ([facility] [address]  Neuropathy  Syncope Allergies Allergen Reactions  Evista [Raloxifene] Diarrhea and Swelling Makes throat swell  Doxycycline Swelling Tongue  Pcn [Penicillins] Swelling Of the tongue  Spiriva With Handihaler [Tiotropium Bromide] Other (comments) Chest pain Past Surgical History:  
Procedure Laterality Date  HX APPENDECTOMY Social History Socioeconomic History  Marital status:  Spouse name: Not on file  Number of children: Not on file  Years of education: Not on file  Highest education level: Not on file Tobacco Use  Smoking status: Former Smoker Packs/day: 0.50 Years: 40.00 Pack years: 20.00 Last attempt to quit: 2018 Years since quittin.8  Smokeless tobacco: Never Used  Tobacco comment: states has been trying to stop-given stop smoking information Substance and Sexual Activity  Alcohol use: No  
 Drug use: No  
 
 
 
Objective:  
 
Review of Systems: 
Constitutional: Negative for fatigue or malaise Derm: Negative for rash or lesion HEENT: Negative for acute hearing or vision changes Cardiovascular: Negative for dizziness, chest pain or palpitations Respiratory: see HPI Gastreintestinal: Negative for nausea or abdominal pain Genital/urinary: Negative for dysuria or voiding dysfunction Muscoloskeletal: see HPI, Negative for acute myalgias or arthralgias Neurological: Negative for headache, weakness or paresthesia Psychological: Negative for depression or anxiety Vitals:  
 19 1359 BP: 146/79 Pulse: 88 Resp: 20 Temp: 97.5 °F (36.4 °C) TempSrc: Oral  
SpO2: 95% Weight: 84 lb 6.4 oz (38.3 kg) Height: 5' 6\" (1.676 m) Body mass index is 13.62 kg/m². Physical Exam: 
Constitutional: thin, in no acute distress Skin: warm and dry, normal tone and turgor Head: normocephalic, atraumatic Eyes: sclera clear, EOMI Neck: normal range of motion CV: normal S1, S2, regular rate and rhythm Respiratory: diminished airflow, clear to auscultation bilaterally with symmetrical effort Extremities: full range of motion Neurology: normal strength and sensation Psych: active, alert and oriented, affect appropriate Assessment and orders:  
 
Diagnoses and all orders for this visit: 
 
1. Other emphysema (H[facility]) 
-     zafirlukast (ACCOLATE) 10 mg tablet; Take 1 Tab by mouth two (2) times a day. -     ipratropium-albuterol (COMBIVENT RESPIMAT)  mcg/actuation inhaler; USE 1 PUFF BY MOUTH EVERY 6 HOURS 
-     salmeterol (SEREVENT) 50 mcg/dose dsdv; Take 1 Puff by inhalation two (2) times a day. 2. Cough -     benzonatate (TESSALON) 200 mg capsule; Take 1 Cap by mouth three (3) times daily as needed for Cough. 3. Underweight 4. Smoking greater than 30 pack years Plan of care: 
Diagnoses were discussed in detail with patient. Advised to make well woman appointment. Strongly advised patient to stop all use of tobacco products. Medication risks/benefits/side effects discussed with patient. All of the patient's questions were addressed. The patient understands and agrees with our plan of care. The patient knows to call back if they are unsure of or forgets any changes we discussed today or if the symptoms change. The patient received an After-Visit Summary which contains VS, orders, allergy and medication lists. Patient Care Team: 
Sabrina Freitas MD as Physician (Gastroenterology) Mya Monterroso MD (Family Practice) Follow-up and Dispositions · Return in about 3 months (around 6/26/2019), or if symptoms worsen or fail to improve. No future appointments. Signed By: Bubba Alex MD   
 March 26, 2019

## 2019-03-27 NOTE — PATIENT INSTRUCTIONS
Breathing Techniques for COPD: Care Instructions Your Care Instructions Breathing is hard when you have chronic obstructive pulmonary disease (COPD). You may take quick, short breaths. Breathing this way makes it harder to get air into your lungs. Learning new ways to control your breathing may help. You may feel better and be able to do more. You can try three basic ways to control your breathing. They are pursed-lip breathing, diaphragmatic breathing, and breathing while bending. Use these methods when you are more short of breath than normal. Practice them often so you can do them well. Follow-up care is a key part of your treatment and safety. Be sure to make and go to all appointments, and call your doctor if you are having problems. It's also a good idea to know your test results and keep a list of the medicines you take. How can you care for yourself at home? · Pursed-lip breathing helps you breathe more air out so that your next breath can be deeper. For this type of breathing, you breathe in through your nose and out through your mouth while almost closing your lips. Breathe in for about 2 seconds, and breathe out for 4 to 6 seconds. Pursed-lip breathing decreases shortness of breath and improves your ability to exercise. · Diaphragmatic breathing helps your lungs expand so that they take in more air. ? Lie on your back, or prop yourself up on several pillows. ? Put one hand on your belly and the other on your chest. When you breathe in, push your belly out as far as possible. You should feel the hand on your belly move out, while the hand on your chest does not move. ? When you breathe out, you should feel the hand on your belly move in. When you can do this type of breathing well while lying down, learn to do it while sitting or standing. Many people with COPD find this breathing method helpful. ? Practice diaphragmatic breathing for 20 minutes, 2 or 3 times a day. · Breathing while bending forward at the waist may make breathing easier. It can reduce shortness of breath while you exercise or rest. It helps the diaphragm move more easily. The diaphragm is a large muscle that separates your lungs from your belly. It helps draw air into your lungs as you breathe. · Do not smoke. Smoking makes COPD worse. If you need help quitting, talk to your doctor about stop-smoking programs and medicines. These can increase your chances of quitting for good. When should you call for help? Call your doctor now or seek immediate medical care if: 
  · Your breathing methods do not help.  
  · Your shortness of breath gets worse.  
  · You cough up blood.  
  · You have swelling in your belly and legs.  
  · You have severe chest pain.  
 Watch closely for changes in your health, and be sure to contact your doctor if you have any problems. Where can you learn more? Go to http://valentina-elvis.info/. Enter D046 in the search box to learn more about \"Breathing Techniques for COPD: Care Instructions. \" Current as of: September 5, 2018 Content Version: 11.9 © 1738-0740 [employer]. Care instructions adapted under license by [employer] (which disclaims liability or warranty for this information). If you have questions about a medical condition or this instruction, always ask your healthcare professional. [address] any warranty or liability for your use of this information.

## 2019-06-18 DIAGNOSIS — J43.8 OTHER EMPHYSEMA (HCC): ICD-10-CM

## 2019-06-18 RX ORDER — ZAFIRLUKAST 10 MG/1
10 TABLET, FILM COATED ORAL 2 TIMES DAILY
Qty: 180 TAB | Refills: 1 | OUTPATIENT
Start: 2019-06-18 | End: 2019-07-22 | Stop reason: SDUPTHER

## 2019-06-18 NOTE — TELEPHONE ENCOUNTER
Requested Prescriptions     Signed Prescriptions Disp Refills    zafirlukast (ACCOLATE) 10 mg tablet 180 Tab 1     Sig: Take 1 Tab by mouth two (2) times a day. Authorizing Provider: Trang Meza User: Gino Ledesma ipratropium-albuterol (COMBIVENT RESPIMAT)  mcg/actuation inhaler 4 Each 3     Sig: USE 1 PUFF BY MOUTH EVERY 4 HOURS as needed. Authorizing Provider: Trang Meza User: Aly khan per Dr. Clifford Adams.

## 2019-07-22 ENCOUNTER — OFFICE VISIT (OUTPATIENT)
Dept: FAMILY MEDICINE CLINIC | Age: 64
End: 2019-07-22

## 2019-07-22 VITALS
WEIGHT: 81 LBS | HEIGHT: 66 IN | DIASTOLIC BLOOD PRESSURE: 66 MMHG | BODY MASS INDEX: 13.02 KG/M2 | SYSTOLIC BLOOD PRESSURE: 112 MMHG | TEMPERATURE: 98 F | RESPIRATION RATE: 16 BRPM | HEART RATE: 89 BPM | OXYGEN SATURATION: 94 %

## 2019-07-22 DIAGNOSIS — R63.6 UNDERWEIGHT: ICD-10-CM

## 2019-07-22 DIAGNOSIS — J44.1 COPD EXACERBATION (HCC): Primary | ICD-10-CM

## 2019-07-22 DIAGNOSIS — M81.0 AGE-RELATED OSTEOPOROSIS WITHOUT CURRENT PATHOLOGICAL FRACTURE: ICD-10-CM

## 2019-07-22 DIAGNOSIS — Z13.39 SCREENING FOR ALCOHOLISM: ICD-10-CM

## 2019-07-22 DIAGNOSIS — Z00.00 MEDICARE ANNUAL WELLNESS VISIT, SUBSEQUENT: ICD-10-CM

## 2019-07-22 DIAGNOSIS — Z13.31 SCREENING FOR DEPRESSION: ICD-10-CM

## 2019-07-22 DIAGNOSIS — G35 MS (MULTIPLE SCLEROSIS) (HCC): ICD-10-CM

## 2019-07-22 RX ORDER — AZITHROMYCIN 250 MG/1
TABLET, FILM COATED ORAL
Qty: 6 TAB | Refills: 0 | Status: SHIPPED | OUTPATIENT
Start: 2019-07-22 | End: 2019-07-27

## 2019-07-22 RX ORDER — ZAFIRLUKAST 10 MG/1
10 TABLET, FILM COATED ORAL 2 TIMES DAILY
Qty: 180 TAB | Refills: 1 | Status: SHIPPED | OUTPATIENT
Start: 2019-07-22 | End: 2019-07-22

## 2019-07-22 RX ORDER — IBANDRONATE SODIUM 150 MG/1
TABLET, FILM COATED ORAL
Qty: 3 TAB | Refills: 3 | Status: SHIPPED | OUTPATIENT
Start: 2019-07-22 | End: 2021-02-28

## 2019-07-22 RX ORDER — PREDNISONE 20 MG/1
20 TABLET ORAL 2 TIMES DAILY
Qty: 10 TAB | Refills: 0 | Status: SHIPPED | OUTPATIENT
Start: 2019-07-22 | End: 2019-07-27

## 2019-07-22 NOTE — PATIENT INSTRUCTIONS
Learning About COPD Triggers  What are triggers? When you have COPD (chronic obstructive pulmonary disease), certain things can make your symptoms worse. These are called triggers. They include:  · Cigarette smoke or air pollution. · Illnesses like colds, flu, or pneumonia. · Cleaning supplies or other chemicals. · Gases, particles, or fumes from wood or kerosene home heaters. Not all people have the same triggers. What may cause symptoms in one person may not be a problem for another person. How do triggers affect COPD? Triggers can make it harder for your lungs to work as they should and can lead to sudden difficulty breathing and other symptoms. When you are around a trigger, a COPD flare-up is more likely. If your symptoms are severe, you may need emergency treatment or have to go to the hospital for treatment. If you know what your triggers are and can avoid them, you can reduce how often you have flare-ups and how much COPD affects your life. It's also important to be active and to take your daily medicines as prescribed. This helps prevent flare-ups too. What can you do to avoid triggers? The first thing is to know your triggers. When you are having symptoms, note the things around you that might be causing them. Then look for patterns in what may be triggering your symptoms. When you have your list of possible triggers, work with your doctor to find ways to avoid them. Here are some ways to avoid a few common triggers. · Do not smoke or allow others to smoke around you. If you need help quitting, talk to your doctor about stop-smoking programs and medicines. These can increase your chances of quitting for good. · If there is a lot of pollution, pollen, or dust outside, stay at home and keep your windows closed. Use an air conditioner or air filter in your home. Check your local weather report or newspaper for air quality and pollen reports. · Get a flu vaccine every year.  Talk to your doctor about getting a pneumococcal shot. Wash your hands often to prevent infections. How can you manage a flare-up? Do not panic if you start to have a COPD flare-up. · If you have a COPD action plan, follow the plan. In general:  ? Use your quick-relief inhaler as directed by your doctor. If your symptoms do not get better after you use your medicine, have someone take you to the emergency room. Call an ambulance if needed. ? Use a spacer with your metered-dose inhaler (MDI). If you have a nebulizer for inhaled medicine, use it. A spacer or nebulizer may help get more medicine to your lungs. ? If your doctor has given you other inhaled medicines or steroid pills, take them as directed. ? If your doctor has given you a prescription for an antibiotic, fill it if you need to.  ? Call your doctor if you have to use your antibiotic or steroid pills. Where can you learn more? Go to http://valentina-elvis.info/. Enter Y769 in the search box to learn more about \"Learning About COPD Triggers. \"  Current as of: September 5, 2018  Content Version: 12.1  © 9376-9764 [employer]. Care instructions adapted under license by [employer] (which disclaims liability or warranty for this information). If you have questions about a medical condition or this instruction, always ask your healthcare professional. [address] any warranty or liability for your use of this information. Medicare Wellness Visit, Female     The best way to live healthy is to have a lifestyle where you eat a well-balanced diet, exercise regularly, limit alcohol use, and quit all forms of tobacco/nicotine, if applicable. Regular preventive services are another way to keep healthy. Preventive services (vaccines, screening tests, monitoring & exams) can help personalize your care plan, which helps you manage your own care.  Screening tests can find health problems at the earliest stages, when they are easiest to treat. [facility] follows the current, evidence-based guidelines published by the [facility] Roni Bonilla (USPSTF) when recommending preventive services for our patients. Because we follow these guidelines, sometimes recommendations change over time as research supports it. (For example, mammograms used to be recommended annually. Even though Medicare will still pay for an annual mammogram, the newer guidelines recommend a mammogram every two years for women of average risk.)  Of course, you and your doctor may decide to screen more often for some diseases, based on your risk and your health status. Preventive services for you include:  - Medicare offers their members a free annual wellness visit, which is time for you and your primary care provider to discuss and plan for your preventive service needs. Take advantage of this benefit every year!  -All adults over the age of 72 should receive the recommended pneumonia vaccines. Current USPSTF guidelines recommend a series of two vaccines for the best pneumonia protection.   -All adults should have a flu vaccine yearly and a tetanus vaccine every 10 years. All adults age 61 and older should receive a shingles vaccine once in their lifetime.    -A bone mass density test is recommended when a woman turns 65 to screen for osteoporosis. This test is only recommended one time, as a screening. Some providers will use this same test as a disease monitoring tool if you already have osteoporosis.   -All adults age 38-68 who are overweight should have a diabetes screening test once every three years.   -Other screening tests and preventive services for persons with diabetes include: an eye exam to screen for diabetic retinopathy, a kidney function test, a foot exam, and stricter control over your cholesterol.   -Cardiovascular screening for adults with routine risk involves an electrocardiogram (ECG) at intervals determined by your doctor.   -Colorectal cancer screenings should be done for adults age 54-65 with no increased risk factors for colorectal cancer. There are a number of acceptable methods of screening for this type of cancer. Each test has its own benefits and drawbacks. Discuss with your doctor what is most appropriate for you during your annual wellness visit. The different tests include: colonoscopy (considered the best screening method), a fecal occult blood test, a fecal DNA test, and sigmoidoscopy. -Breast cancer screenings are recommended every other year for women of normal risk, age 54-69.  -Cervical cancer screenings for women over age 72 are only recommended with certain risk factors.   -All adults born between [facility] should be screened once for Hepatitis C.      Here is a list of your current Health Maintenance items (your personalized list of preventive services) with a due date:  Health Maintenance Due   Topic Date Due    Annual Well Visit  06/21/2019

## 2019-07-22 NOTE — PROGRESS NOTES
1. Have you been to the ER, urgent care clinic, or been hospitalized since your last visit? No     2. Have you seen or consulted any other health care providers outside of the [address] [place] [address] since your last visit?    No       Reviewed record in preparation for visit and have necessary documentation  opportunity was given for questions  Goals that were addressed and/or need to be completed during or after this appointment include   Health Maintenance Due   Topic Date Due    MEDICARE YEARLY EXAM  06/21/2019

## 2019-10-01 ENCOUNTER — OFFICE VISIT (OUTPATIENT)
Dept: FAMILY MEDICINE CLINIC | Age: 64
End: 2019-10-01

## 2019-10-01 VITALS
RESPIRATION RATE: 28 BRPM | TEMPERATURE: 98.1 F | OXYGEN SATURATION: 87 % | WEIGHT: 79 LBS | HEART RATE: 91 BPM | DIASTOLIC BLOOD PRESSURE: 60 MMHG | HEIGHT: 66 IN | SYSTOLIC BLOOD PRESSURE: 108 MMHG | BODY MASS INDEX: 12.69 KG/M2

## 2019-10-01 DIAGNOSIS — J44.1 COPD EXACERBATION (HCC): Primary | ICD-10-CM

## 2019-10-01 DIAGNOSIS — J43.8 OTHER EMPHYSEMA (HCC): ICD-10-CM

## 2019-10-01 DIAGNOSIS — M81.8 OTHER OSTEOPOROSIS WITHOUT CURRENT PATHOLOGICAL FRACTURE: ICD-10-CM

## 2019-10-01 DIAGNOSIS — G35 MS (MULTIPLE SCLEROSIS) (HCC): ICD-10-CM

## 2019-10-01 DIAGNOSIS — Z72.0 TOBACCO ABUSE: ICD-10-CM

## 2019-10-01 DIAGNOSIS — R63.6 UNDERWEIGHT: ICD-10-CM

## 2019-10-01 RX ORDER — PREDNISONE 10 MG/1
TABLET ORAL
Qty: 30 TAB | Refills: 0 | Status: SHIPPED | OUTPATIENT
Start: 2019-10-01 | End: 2019-10-15 | Stop reason: ALTCHOICE

## 2019-10-01 RX ORDER — AZITHROMYCIN 250 MG/1
TABLET, FILM COATED ORAL
Qty: 6 TAB | Refills: 0 | Status: SHIPPED | OUTPATIENT
Start: 2019-10-01 | End: 2019-10-06

## 2019-10-01 NOTE — PROGRESS NOTES
1. Have you been to the ER, urgent care clinic since your last visit? Hospitalized since your last visit? No    2. Have you seen or consulted any other health care providers outside of the [address] [place] [address] since your last visit? Include any pap smears or colon screening.  No  Reviewed record in preparation for visit and have necessary documentation  Pt did not bring medication to office visit for review    Goals that were addressed and/or need to be completed during or after this appointment include     Health Maintenance Due   Topic Date Due    Influenza Age 5 to Adult  08/01/2019

## 2019-10-02 LAB
ALBUMIN SERPL-MCNC: 4.8 G/DL (ref 3.6–4.8)
ALBUMIN/GLOB SERPL: 2.4 {RATIO} (ref 1.2–2.2)
ALP SERPL-CCNC: 69 IU/L (ref 39–117)
ALT SERPL-CCNC: 21 IU/L (ref 0–32)
AST SERPL-CCNC: 25 IU/L (ref 0–40)
BASOPHILS # BLD AUTO: 0.1 X10E3/UL (ref 0–0.2)
BASOPHILS NFR BLD AUTO: 1 %
BILIRUB SERPL-MCNC: 0.3 MG/DL (ref 0–1.2)
BUN SERPL-MCNC: 12 MG/DL (ref 8–27)
BUN/CREAT SERPL: 20 (ref 12–28)
CALCIUM SERPL-MCNC: 10 MG/DL (ref 8.7–10.3)
CHLORIDE SERPL-SCNC: 99 MMOL/L (ref 96–106)
CO2 SERPL-SCNC: 27 MMOL/L (ref 20–29)
CREAT SERPL-MCNC: 0.6 MG/DL (ref 0.57–1)
EOSINOPHIL # BLD AUTO: 0.1 X10E3/UL (ref 0–0.4)
EOSINOPHIL NFR BLD AUTO: 1 %
ERYTHROCYTE [DISTWIDTH] IN BLOOD BY AUTOMATED COUNT: 11.6 % (ref 12.3–15.4)
GLOBULIN SER CALC-MCNC: 2 G/DL (ref 1.5–4.5)
GLUCOSE SERPL-MCNC: 118 MG/DL (ref 65–99)
HCT VFR BLD AUTO: 51 % (ref 34–46.6)
HGB BLD-MCNC: 17.2 G/DL (ref 11.1–15.9)
IMM GRANULOCYTES # BLD AUTO: 0 X10E3/UL (ref 0–0.1)
IMM GRANULOCYTES NFR BLD AUTO: 0 %
LYMPHOCYTES # BLD AUTO: 0.8 X10E3/UL (ref 0.7–3.1)
LYMPHOCYTES NFR BLD AUTO: 10 %
MCH RBC QN AUTO: 34.4 PG (ref 26.6–33)
MCHC RBC AUTO-ENTMCNC: 33.7 G/DL (ref 31.5–35.7)
MCV RBC AUTO: 102 FL (ref 79–97)
MONOCYTES # BLD AUTO: 0.6 X10E3/UL (ref 0.1–0.9)
MONOCYTES NFR BLD AUTO: 8 %
NEUTROPHILS # BLD AUTO: 6.7 X10E3/UL (ref 1.4–7)
NEUTROPHILS NFR BLD AUTO: 80 %
PLATELET # BLD AUTO: 210 X10E3/UL (ref 150–450)
POTASSIUM SERPL-SCNC: 5.3 MMOL/L (ref 3.5–5.2)
PROT SERPL-MCNC: 6.8 G/DL (ref 6–8.5)
RBC # BLD AUTO: 5 X10E6/UL (ref 3.77–5.28)
SODIUM SERPL-SCNC: 145 MMOL/L (ref 134–144)
TSH SERPL DL<=0.005 MIU/L-ACNC: 0.73 UIU/ML (ref 0.45–4.5)
WBC # BLD AUTO: 8.3 X10E3/UL (ref 3.4–10.8)

## 2019-10-04 NOTE — PROGRESS NOTES
Progress Note    Patient: Diann Hines MRN: [id number]  SSN: [id number]    YOB: 1955  Age: 59 y.o. Sex: female        Subjective:     Chief Complaint   Patient presents with    Follow-up    Breathing Problem    Rash     right ankle/foot for one month       HPI: she is a 59y.o. year old female  who presents with complaints of productive cough and SOB. She says this has been worsening. There is a hx of asthma, COPD, allergic rhinitis and tobacco use. Patient relies on Combivent for her symptoms. She is followed by pain management and is on opioid medication. She has a dx of MS. Patient denies HA, dizziness, CP, abdominal pain, dysuria, weakness or paresthesia. She is underweight and blames this on life stressors. Also complains of rash on the right ankle. Encounter Diagnoses   Name Primary?  COPD exacerbation ([facility] Yes    Underweight     Other emphysema ([facility]     MS (multiple sclerosis) ([facility]     Other osteoporosis without current pathological fracture      Tobacco abuse        BP Readings from Last 3 Encounters:   10/01/19 108/60   07/22/19 112/66   03/26/19 146/79     Wt Readings from Last 3 Encounters:   10/01/19 79 lb (35.8 kg)   07/22/19 81 lb (36.7 kg)   03/26/19 84 lb 6.4 oz (38.3 kg)     Body mass index is 12.75 kg/m². Current and past medical information:    Current Medications after this visit[de-identified]     Current Outpatient Medications   Medication Sig    predniSONE (DELTASONE) 10 mg tablet Take 4 tabs for three days, Then take 3 tabs for three days, Then take 2 tabs for three days, Then take 1 tab for three days.  azithromycin (ZITHROMAX) 250 mg tablet Take 2 tablets today, then take 1 tablet daily    ibandronate (BONIVA) 150 mg tablet Take 150 mg by mouth every thirty (30) days.  ipratropium-albuterol (COMBIVENT RESPIMAT)  mcg/actuation inhaler USE 1 PUFF BY MOUTH EVERY 4 HOURS as needed.     salmeterol (SEREVENT) 50 mcg/dose dsdv Take 1 Puff by inhalation two (2) times a day.  albuterol-ipratropium (DUO-NEB) 2.5 mg-0.5 mg/3 ml nebu 3 mL by Nebulization route every four (4) hours as needed.  Nebulizer & Compressor machine For use as breathing treatment every 4 - 6 hours as needed.  Nebulizer Accessories kit For use as breathing treatment every 4 - 6 hours as needed.  cyclobenzaprine (FLEXERIL) 10 mg tablet Take 1 Tab by mouth three (3) times daily as needed for Muscle Spasm(s).  oxyCODONE-acetaminophen (PERCOCET 10)  mg per tablet Take 1 Tab by mouth. No current facility-administered medications for this visit.         Patient Active Problem List    Diagnosis Date Noted    Back muscle spasm 06/28/2016    Depression 09/19/2014    Anorexia 09/18/2014    Back pain 07/15/2011    Anxiety 02/09/2011    Tobacco abuse 12/06/2010    Asthma 12/06/2010    COPD (chronic obstructive pulmonary disease) ([facility] 12/06/2010    Hyperlipemia 06/16/2010    Neuropathy     Fibromyalgia     [facility]) [facility]        Past Medical History:   Diagnosis Date    Anxiety     Asthma 12/6/2010    COPD (chronic obstructive pulmonary disease) ([facility] [facility] 12/6/2010    Depression 9/19/2014    Fibromyalgia     Hyperlipemia 6/16/2010    [facility] sclerosis) ([facility]     Neuropathy     Syncope        Allergies   Allergen Reactions    Evista [Raloxifene] Diarrhea and Swelling     Makes throat swell     Doxycycline Swelling     Tongue      Pcn [Penicillins] Swelling     Of the tongue    Spiriva With Handihaler [Tiotropium Bromide] Other (comments)     Chest pain        Past Surgical History:   Procedure Laterality Date    HX APPENDECTOMY         Social History     Socioeconomic History    Marital status:      Spouse name: Not on file    Number of children: Not on file    Years of education: Not on file    Highest education level: Not on file   Tobacco Use    Smoking status: Former Smoker     Packs/day: 0.50     Years: 40.00     Pack years: 20.00     Last attempt to quit: 2018     Years since quittin.3    Smokeless tobacco: Never Used    Tobacco comment: states has been trying to stop-given stop smoking information   Substance and Sexual Activity    Alcohol use: No    Drug use: No       Objective:     Review of Systems:  Constitutional: Negative for fatigue or malaise  Derm: Negative for rash or lesion  HEENT: Negative for acute hearing or vision changes  Cardiovascular: Negative for dizziness, chest pain or palpitations  Respiratory: see HPI  Gastrointestinal: Negative for nausea or abdominal pain  Genital/urinary: Negative for dysuria or voiding dysfunction  Musculoskeletal: see HPI, Negative for acute myalgias or arthralgias   Neurological: Negative for headache, weakness or paresthesia  Psychological: Negative for depression or anxiety      Vitals:    10/01/19 1323   BP: 108/60   Pulse: 91   Resp: 28   Temp: 98.1 °F (36.7 °C)   TempSrc: Oral   SpO2: (!) 87%   Weight: 79 lb (35.8 kg)   Height: 5' 6\" (1.676 m)      Body mass index is 12.75 kg/m². Physical Exam:  Constitutional: thin, in no acute distress  Skin: warm and dry, normal tone and turgor  Head: normocephalic, atraumatic  Eyes: sclera clear, EOMI  Neck: normal range of motion  CV: normal S1, S2, regular rate and rhythm  Respiratory: diminished airflow, clear to auscultation bilaterally with symmetrical effort  Extremities: full range of motion  Neurology: normal strength and sensation  Psych: active, alert and oriented, affect appropriate       Assessment and orders:     Diagnoses and all orders for this visit:    1. COPD exacerbation ([facility] [address]  -     REFERRAL TO PULMONARY DISEASE  -     XR CHEST PA LAT; Future  -     predniSONE (DELTASONE) 10 mg tablet; Take 4 tabs for three days, Then take 3 tabs for three days, Then take 2 tabs for three days, Then take 1 tab for three days. -     azithromycin (ZITHROMAX) 250 mg tablet;  Take 2 tablets today, then take 1 tablet daily  - CBC WITH AUTOMATED DIFF    2. Underweight  -     CBC WITH AUTOMATED DIFF  -     METABOLIC PANEL, COMPREHENSIVE  -     TSH 3RD GENERATION    3. Other emphysema ([address]  -     CBC WITH AUTOMATED DIFF    4. MS (multiple sclerosis) (HCC)    5. Other osteoporosis without current pathological fracture   -     TSH 3RD GENERATION    6. Tobacco abuse        Plan of care:  Diagnoses were discussed in detail with patient. Medication risks/benefits/side effects discussed with patient. All of the patient's questions were addressed. The patient understands and agrees with our plan of care. The patient knows to call back if they are unsure of or forgets any changes we discussed today or if the symptoms change. The patient received an After-Visit Summary which contains VS, orders, allergy and medication lists. Patient Care Team:  Kamron Levy MD as PCP - General (Family Practice)  Purvi Roblero MD as Physician (Gastroenterology)  Kamron Levy MD (Family Practice)    Follow-up and Dispositions    · Return in about 2 weeks (around 10/15/2019), or if symptoms worsen or fail to improve, for flu vaccine.          Future Appointments   Date Time Provider Federico Sales   10/15/2019  1:50 PM Kamron Levy MD [facility] SCHED       Signed By: Lindy Shaffer MD     October 3, 2019

## 2019-10-04 NOTE — PATIENT INSTRUCTIONS

## 2019-10-15 ENCOUNTER — OFFICE VISIT (OUTPATIENT)
Dept: FAMILY MEDICINE CLINIC | Age: 64
End: 2019-10-15

## 2019-10-15 VITALS
RESPIRATION RATE: 32 BRPM | HEIGHT: 66 IN | HEART RATE: 90 BPM | OXYGEN SATURATION: 89 % | SYSTOLIC BLOOD PRESSURE: 127 MMHG | DIASTOLIC BLOOD PRESSURE: 73 MMHG | TEMPERATURE: 98.1 F | WEIGHT: 78 LBS | BODY MASS INDEX: 12.54 KG/M2

## 2019-10-15 DIAGNOSIS — B35.3 TINEA PEDIS OF RIGHT FOOT: ICD-10-CM

## 2019-10-15 DIAGNOSIS — Z23 ENCOUNTER FOR IMMUNIZATION: ICD-10-CM

## 2019-10-15 DIAGNOSIS — J43.8 OTHER EMPHYSEMA (HCC): ICD-10-CM

## 2019-10-15 DIAGNOSIS — Z87.891 HISTORY OF TOBACCO ABUSE: ICD-10-CM

## 2019-10-15 DIAGNOSIS — J44.1 COPD EXACERBATION (HCC): Primary | ICD-10-CM

## 2019-10-15 RX ORDER — KETOCONAZOLE 20 MG/G
CREAM TOPICAL 2 TIMES DAILY
Qty: 15 G | Refills: 1 | Status: SHIPPED | OUTPATIENT
Start: 2019-10-15 | End: 2019-11-19 | Stop reason: SDUPTHER

## 2019-10-15 RX ORDER — PREDNISONE 5 MG/1
5 TABLET ORAL DAILY
Qty: 14 TAB | Refills: 0 | Status: SHIPPED | OUTPATIENT
Start: 2019-10-15 | End: 2020-05-01

## 2019-10-15 NOTE — PROGRESS NOTES
1. Have you been to the ER, urgent care clinic since your last visit? Hospitalized since your last visit? No    2. Have you seen or consulted any other health care providers outside of the [address] [place] [address] since your last visit? Include any pap smears or colon screening.  No  Reviewed record in preparation for visit and have necessary documentation  Pt did not bring medication to office visit for review    Goals that were addressed and/or need to be completed during or after this appointment include     Health Maintenance Due   Topic Date Due    Influenza Age 5 to Adult  08/01/2019

## 2019-10-20 NOTE — PROGRESS NOTES
Progress Note    Patient: Rica Ferrera MRN: [id number]  SSN: [id number]    YOB: 1955  Age: 59 y.o. Sex: female        Subjective:     Chief Complaint   Patient presents with    Follow-up     2 week breathing/cough    Rash     right ankle       HPI: she is a 59y.o. year old female  who presents for follow up of productive cough and SOB. She says this has been worsening. Short course of steroid had provided some relief, however symptoms have recurred. She says she has an appointment with pulmonology in 3 weeks. There is a hx of asthma, COPD, allergic rhinitis and tobacco use. She has said she quit smoking, however clothing smells of tobacco smoke. Patient relies on Combivent for her symptoms. She is followed by pain management and is on opioid medication. She has a dx of MS. Patient denies HA, dizziness, CP, abdominal pain, dysuria, weakness or paresthesia. She is underweight and blames this on life stressors. Also complains of rash on the right ankle. Encounter Diagnoses   Name Primary?  COPD exacerbation ([address] Yes    Other emphysema ([facility] [address]     Tinea pedis of right foot     History of tobacco abuse     Encounter for immunization        BP Readings from Last 3 Encounters:   10/15/19 127/73   10/01/19 108/60   07/22/19 112/66     Wt Readings from Last 3 Encounters:   10/15/19 78 lb (35.4 kg)   10/01/19 79 lb (35.8 kg)   07/22/19 81 lb (36.7 kg)     Body mass index is 12.59 kg/m². Current and past medical information:    Current Medications after this visit[de-identified]     Current Outpatient Medications   Medication Sig    predniSONE (DELTASONE) 5 mg tablet Take 1 Tab by mouth daily.  ketoconazole (NIZORAL) 2 % topical cream Apply  to affected area two (2) times a day.  ibandronate (BONIVA) 150 mg tablet Take 150 mg by mouth every thirty (30) days.  ipratropium-albuterol (COMBIVENT RESPIMAT)  mcg/actuation inhaler USE 1 PUFF BY MOUTH EVERY 4 HOURS as needed.     albuterol-ipratropium (DUO-NEB) 2.5 mg-0.5 mg/3 ml nebu 3 mL by Nebulization route every four (4) hours as needed.  Nebulizer & Compressor machine For use as breathing treatment every 4 - 6 hours as needed.  Nebulizer Accessories kit For use as breathing treatment every 4 - 6 hours as needed.  cyclobenzaprine (FLEXERIL) 10 mg tablet Take 1 Tab by mouth three (3) times daily as needed for Muscle Spasm(s).  oxyCODONE-acetaminophen (PERCOCET 10)  mg per tablet Take 1 Tab by mouth.  salmeterol (SEREVENT) 50 mcg/dose dsdv Take 1 Puff by inhalation two (2) times a day. No current facility-administered medications for this visit.         Patient Active Problem List    Diagnosis Date Noted    Back muscle spasm 06/28/2016    Depression 09/19/2014    Anorexia 09/18/2014    Back pain 07/15/2011    Anxiety 02/09/2011    Tobacco abuse 12/06/2010    Asthma 12/06/2010    COPD [facility] 12/06/2010    Hyperlipemia 06/16/2010    Neuropathy     Fibromyalgia     MS (multiple sclerosis) ([facility])        Past Medical History:   Diagnosis Date    Anxiety     Asthma 12/6/2010    COPD (chronic obstructive pulmonary disease) ([facility] 12/6/2010    Depression 9/19/2014    Fibromyalgia     Hyperlipemia 6/16/2010    MS (multiple sclerosis) ([facility])     Neuropathy     Syncope        Allergies   Allergen Reactions    Evista [Raloxifene] Diarrhea and Swelling     Makes throat swell     Doxycycline Swelling     Tongue      Pcn [Penicillins] Swelling     Of the tongue    Spiriva With Handihaler [Tiotropium Bromide] Other (comments)     Chest pain        Past Surgical History:   Procedure Laterality Date    HX APPENDECTOMY         Social History     Socioeconomic History    Marital status:      Spouse name: Not on file    Number of children: Not on file    Years of education: Not on file    Highest education level: Not on file   Tobacco Use    Smoking status: Former Smoker     Packs/day: 0.50     Years: 40.00     Pack years: 20.00     Last attempt to quit: 2018     Years since quittin.4    Smokeless tobacco: Never Used    Tobacco comment: states has been trying to stop-given stop smoking information   Substance and Sexual Activity    Alcohol use: No    Drug use: No       Objective:     Review of Systems:  Constitutional: Negative for fatigue or malaise  Derm: see HPI  HEENT: Negative for acute hearing or vision changes  Cardiovascular: Negative for dizziness, chest pain or palpitations  Respiratory: see HPI  Gastrointestinal: Negative for nausea or abdominal pain  Genital/urinary: Negative for dysuria or voiding dysfunction  Musculoskeletal: Negative for acute myalgias or arthralgias   Neurological: Negative for headache, weakness or paresthesia  Psychological: Negative for depression or anxiety      Vitals:    10/15/19 1346   BP: 127/73   Pulse: 90   Resp: (!) 32   Temp: 98.1 °F (36.7 °C)   TempSrc: Oral   SpO2: (!) 89%   Weight: 78 lb (35.4 kg)   Height: 5' 6\" (1.676 m)      Body mass index is 12.59 kg/m². Physical Exam:  Constitutional: thin, clothing smells of tobacco smoke  Skin: tinea right posterior foot  Head: normocephalic, atraumatic  Eyes: sclera clear, EOMI  Neck: normal range of motion  CV: normal S1, S2, regular rate and rhythm  Respiratory: diminished airflow, clear to auscultation bilaterally with symmetrical effort  Extremities: full range of motion, antalgic gait  Neurology: normal strength and sensation  Psych: active, alert and oriented, affect appropriate       Assessment and orders:     Diagnoses and all orders for this visit:    1. COPD exacerbation (HCC)  -     predniSONE (DELTASONE) 5 mg tablet; Take 1 Tab by mouth daily. 2. Other emphysema ([address]    3. Tinea pedis of right foot  -     ketoconazole (NIZORAL) 2 % topical cream; Apply  to affected area two (2) times a day. 4. History of tobacco abuse    5.  Encounter for immunization  -     ADMIN INFLUENZA VIRUS VAC  -     INFLUENZA VIRUS VAC QUAD,SPLIT,PRESV FREE SYRINGE IM        Plan of care:  Diagnoses were discussed in detail with patient. Medication risks/benefits/side effects discussed with patient. All of the patient's questions were addressed. The patient understands and agrees with our plan of care. The patient knows to call back if they are unsure of or forgets any changes we discussed today or if the symptoms change. The patient received an After-Visit Summary which contains VS, orders, allergy and medication lists. Patient Care Team:  Neill Skiff, MD as PCP - General (Family Practice)  Bam Mclain MD as Physician (Gastroenterology)  Neill Skiff, MD (Family Practice)    Follow-up and Dispositions    · Return in about 3 months (around 1/15/2020), or if symptoms worsen or fail to improve. No future appointments.     Signed By: Irene Ceron MD     October 20, 2019

## 2019-10-20 NOTE — PATIENT INSTRUCTIONS
Chronic Obstructive Pulmonary Disease (COPD) Flare-Ups: Care Instructions  Your Care Instructions    Chronic obstructive pulmonary disease (COPD) is a lung disease that makes it hard to breathe. It is caused by damage to the lungs over many years, usually from smoking. COPD is often a mix of two diseases:  · Chronic bronchitis: The airways that carry air to the lungs (bronchial tubes) get inflamed and make a lot of mucus. This can narrow or block the airways. · Emphysema: In a healthy person, the tiny air sacs in the lungs are like balloons. As you breathe in and out, they get bigger and smaller to move air through your lungs. But with emphysema, these air sacs are damaged and lose their stretch. Less air gets in and out of the lungs. Many people with COPD have attacks called flare-ups or exacerbations. This is when your usual symptoms quickly get worse and stay worse. The doctor has checked you carefully. But problems can develop later. If you notice any problems or new symptoms, get medical treatment right away. Follow-up care is a key part of your treatment and safety. Be sure to make and go to all appointments, and call your doctor if you are having problems. It's also a good idea to know your test results and keep a list of the medicines you take. How can you care for yourself at home? · Be safe with medicines. Take your medicines exactly as prescribed. Call your doctor if you think you are having a problem with your medicine. You may be taking medicines such as:  ? Bronchodilators. These help open your airways and make breathing easier. ? Corticosteroids. These reduce airway inflammation. They may be given as pills, in a vein, or in an inhaled form. You may go home with pills in addition to an inhaler that you already use. · A spacer may help you get more inhaled medicine to your lungs. Ask your doctor or pharmacist if a spacer is right for you. If it is, ask how to use it properly.   · If your doctor prescribed antibiotics, take them as directed. Do not stop taking them just because you feel better. You need to take the full course of antibiotics. · If your doctor prescribed oxygen, use the flow rate your doctor has recommended. Do not change it without talking to your doctor first.  · Do not smoke. Smoking makes COPD worse. If you need help quitting, talk to your doctor about stop-smoking programs and medicines. These can increase your chances of quitting for good. When should you call for help? Call 911 anytime you think you may need emergency care. For example, call if:    · You have severe trouble breathing.    Call your doctor now or seek immediate medical care if:    · You have new or worse trouble breathing.     · Your coughing or wheezing gets worse.     · You cough up dark brown or bloody mucus (sputum).     · You have a new or higher fever.    Watch closely for changes in your health, and be sure to contact your doctor if:    · You notice more mucus or a change in the color of your mucus.     · You need to use your antibiotic or steroid pills.     · You do not get better as expected. Where can you learn more? Go to http://valentina-elvis.info/. Enter S069 in the search box to learn more about \"Chronic Obstructive Pulmonary Disease (COPD) Flare-Ups: Care Instructions. \"  Current as of: June 9, 2019  Content Version: 12.2  © 0823-9799 [employer]. Care instructions adapted under license by [employer] (which disclaims liability or warranty for this information). If you have questions about a medical condition or this instruction, always ask your healthcare professional. [place] [address] any warranty or liability for your use of this information.

## 2020-02-10 NOTE — PATIENT INSTRUCTIONS
February 10, 2020  [address] [facility]      Dear [employer] [employer]: Thank you for requesting access to [employer]. Please follow the instructions below to view your test results, access and download parts of your medical record, view details of your past and upcoming appointments, and view your medications online. How Do I Sign Up? 1. In your internet browser, go to [email or web address]  2. Click on the First Time User? Click Here link in the Sign In box. You will see the New Member Sign Up page. 3. Enter your [employer] Access Code exactly as it appears below. You will not need to use this code after youve completed the sign-up process. If you do not sign up before the expiration date, you must request a new code. · [employer] Access Code: [email or web address]  · Expires: 3/26/2020  3:42 PM    4. Enter the last four digits of your Social Security Number (xxxx) and Date of Birth (mm/dd/yyyy) as indicated and click Submit. You will be taken to the next sign-up page. 5. Create a [employer] ID. This will be your [employer] login ID and cannot be changed, so think of one that is secure and easy to remember. 6. Create a [employer] password. You can change your password at any time. 7. Enter your Password Reset Question and Answer. This can be used at a later time if you forget your password. 8. Enter your e-mail address. You will receive e-mail notification when new information is available in [address]. 9. Click Sign Up. You can now view and download portions of your medical record. 10. Click the Download Summary menu link to download a portable copy of your medical information. Additional Information:              If you require any assistance or have any questions, please contact our [address] at [phone], email at [email or web address] or check online in our Frequently Asked Questions.     Remember, MyChart is NOT to be used for urgent needs. For medical emergencies, dial 911. Now available from your iPhone and Android!     Sincerely,   Rupal Hernandez       new to my practice

## 2020-02-11 ENCOUNTER — OFFICE VISIT (OUTPATIENT)
Dept: FAMILY MEDICINE CLINIC | Age: 65
End: 2020-02-11

## 2020-02-11 VITALS
WEIGHT: 76.8 LBS | HEIGHT: 66 IN | OXYGEN SATURATION: 93 % | BODY MASS INDEX: 12.34 KG/M2 | SYSTOLIC BLOOD PRESSURE: 108 MMHG | RESPIRATION RATE: 24 BRPM | TEMPERATURE: 98 F | DIASTOLIC BLOOD PRESSURE: 70 MMHG | HEART RATE: 90 BPM

## 2020-02-11 DIAGNOSIS — J43.8 OTHER EMPHYSEMA (HCC): Primary | ICD-10-CM

## 2020-02-11 DIAGNOSIS — Z72.0 TOBACCO ABUSE: ICD-10-CM

## 2020-02-11 DIAGNOSIS — G35 MS (MULTIPLE SCLEROSIS) (HCC): ICD-10-CM

## 2020-02-11 DIAGNOSIS — R30.0 DYSURIA: ICD-10-CM

## 2020-02-11 DIAGNOSIS — Z87.01 HISTORY OF PNEUMONIA: ICD-10-CM

## 2020-02-11 DIAGNOSIS — S22.080S COMPRESSION FRACTURE OF T12 VERTEBRA, SEQUELA: ICD-10-CM

## 2020-02-11 DIAGNOSIS — M62.830 BACK MUSCLE SPASM: ICD-10-CM

## 2020-02-11 LAB
BILIRUB UR QL STRIP: NORMAL
GLUCOSE UR-MCNC: NEGATIVE MG/DL
KETONES P FAST UR STRIP-MCNC: NEGATIVE MG/DL
PH UR STRIP: 5.5 [PH] (ref 4.6–8)
PROT UR QL STRIP: NORMAL
SP GR UR STRIP: 1.02 (ref 1–1.03)
UA UROBILINOGEN AMB POC: NORMAL (ref 0.2–1)
URINALYSIS CLARITY POC: CLEAR
URINALYSIS COLOR POC: NORMAL
URINE BLOOD POC: NEGATIVE
URINE LEUKOCYTES POC: NEGATIVE
URINE NITRITES POC: NEGATIVE

## 2020-02-11 RX ORDER — CANE TIPS
EACH MISCELLANEOUS
Qty: 1 DEVICE | Refills: 0 | Status: SHIPPED | OUTPATIENT
Start: 2020-02-11 | End: 2020-02-11 | Stop reason: SDUPTHER

## 2020-02-11 RX ORDER — CYCLOBENZAPRINE HCL 10 MG
10 TABLET ORAL
Qty: 30 TAB | Refills: 2 | Status: SHIPPED | OUTPATIENT
Start: 2020-02-11 | End: 2020-02-24 | Stop reason: SDUPTHER

## 2020-02-11 RX ORDER — CANE TIPS
EACH MISCELLANEOUS
Qty: 1 DEVICE | Refills: 0 | Status: SHIPPED | OUTPATIENT
Start: 2020-02-11

## 2020-02-11 RX ORDER — MOMETASONE FUROATE AND FORMOTEROL FUMARATE DIHYDRATE 100; 5 UG/1; UG/1
AEROSOL RESPIRATORY (INHALATION)
COMMUNITY
Start: 2020-01-13 | End: 2020-05-01 | Stop reason: ALTCHOICE

## 2020-02-11 NOTE — PROGRESS NOTES
1. Have you been to the ER, urgent care clinic since your last visit? Hospitalized since your last visit? Yes When: [place] [facility] [address], 01/2020, UTI    2. Have you seen or consulted any other health care providers outside of the [address] [place] [address] since your last visit? Include any pap smears or colon screening.  No  Reviewed record in preparation for visit and have necessary documentation  Pt did not bring medication to office visit for review    Goals that were addressed and/or need to be completed during or after this appointment include     Health Maintenance Due   Topic Date Due    Shingrix Vaccine Age 50> (1 of 2) 07/15/2005

## 2020-02-16 NOTE — PROGRESS NOTES
Progress Note    Patient: Hola Desai MRN: [id number]  SSN: [id number]    YOB: 1955  Age: 59 y.o. Sex: female        Subjective:     Chief Complaint   Patient presents with   [facility] Follow Up       HPI: she is a 59y.o. year old female  who presents for transition of care. Patient with recent hospitalization at [address] [place] [address]. Patient admitted 1/30/20 with discharge on 2/3/20 with diagnoses of COPD, pneumonia, T12 compression. She complains of hospital experience and insists she only had an UTI. She has completed course of antibiotics which she claims \"did nothing\" for her. Patient with hx of asthma, COPD, MS, allergic rhinitis and tobacco use. She has said she quit smoking, however clothing smells of tobacco smoke. Patient relies on Combivent for her symptoms. She is followed by pain management and is on opioid medication. Patient denies HA, dizziness, CP, abdominal pain, dysuria, weakness or paresthesia. She is underweight and blames this on life stressors. Encounter Diagnoses   Name Primary?  Other emphysema ([address] Yes    History of pneumonia     Dysuria     Back muscle spasm     Compression fracture of T12 vertebra, sequela     MS (multiple sclerosis) ([facility]     Tobacco abuse        BP Readings from Last 3 Encounters:   02/11/20 108/70   10/15/19 127/73   10/01/19 108/60     Wt Readings from Last 3 Encounters:   02/11/20 76 lb 12.8 oz (34.8 kg)   10/15/19 78 lb (35.4 kg)   10/01/19 79 lb (35.8 kg)     Body mass index is 12.4 kg/m². Current and past medical information:    Current Medications after this visit[de-identified]     Current Outpatient Medications   Medication Sig    DULERA 100-5 mcg/actuation HFA inhaler     ipratropium-albuteroL (COMBIVENT RESPIMAT)  mcg/actuation inhaler USE 1 PUFF BY MOUTH EVERY 4 HOURS as needed.  cyclobenzaprine (FLEXERIL) 10 mg tablet Take 1 Tab by mouth three (3) times daily as needed for Muscle Spasm(s).     Shower Chair XX jessica For use during bathing    Cane jessica Use for ambulation assistance    predniSONE (DELTASONE) 5 mg tablet Take 1 Tab by mouth daily. (Patient taking differently: Take 10 mg by mouth daily.)    ibandronate (BONIVA) 150 mg tablet Take 150 mg by mouth every thirty (30) days.  albuterol-ipratropium (DUO-NEB) 2.5 mg-0.5 mg/3 ml nebu 3 mL by Nebulization route every four (4) hours as needed.  Nebulizer & Compressor machine For use as breathing treatment every 4 - 6 hours as needed.  Nebulizer Accessories kit For use as breathing treatment every 4 - 6 hours as needed.  oxyCODONE-acetaminophen (PERCOCET 10)  mg per tablet Take 1 Tab by mouth. No current facility-administered medications for this visit.         Patient Active Problem List    Diagnosis Date Noted    Back muscle spasm 06/28/2016    Depression 09/19/2014    Anorexia 09/18/2014    Back pain 07/15/2011    Anxiety 02/09/2011    Tobacco abuse 12/06/2010    Asthma 12/06/2010    COPD (chronic obstructive pulmonary disease) ([facility] 12/06/2010    Hyperlipemia 06/16/2010    Neuropathy     Fibromyalgia     MS (multiple sclerosis) ([facility]        Past Medical History:   Diagnosis Date    Anxiety     Asthma 12/6/2010    [facility] disease) [facility] 12/6/2010    Depression 9/19/2014    Fibromyalgia     Hyperlipemia 6/16/2010    MS (multiple sclerosis) ([facility])     Neuropathy     Syncope        Allergies   Allergen Reactions    Evista [Raloxifene] Diarrhea and Swelling     Makes throat swell     Doxycycline Swelling     Tongue      Pcn [Penicillins] Swelling     Of the tongue    Spiriva With Handihaler [Tiotropium Bromide] Other (comments)     Chest pain        Past Surgical History:   Procedure Laterality Date    HX APPENDECTOMY         Social History     Socioeconomic History    Marital status:      Spouse name: Not on file    Number of children: Not on file    Years of education: Not on file    Highest education level: Not on file   Tobacco Use    Smoking status: Former Smoker     Packs/day: 0.50     Years: 40.00     Pack years: 20.00     Last attempt to quit: 2018     Years since quittin.7    Smokeless tobacco: Never Used    Tobacco comment: states has been trying to stop-given stop smoking information   Substance and Sexual Activity    Alcohol use: No    Drug use: No       Objective:     Review of Systems:  Constitutional: Negative for fatigue or malaise  Derm: Negative for rash or lesion  HEENT: Negative for acute hearing or vision changes  Cardiovascular: Negative for dizziness, chest pain or palpitations  Respiratory: see HPI  Gastrointestinal: Negative for nausea or abdominal pain  Genital/urinary: Negative for dysuria or voiding dysfunction  Musculoskeletal: see HPI, Negative for acute myalgias or arthralgias   Neurological: Negative for headache, weakness or paresthesia  Psychological: Negative for depression or anxiety      Vitals:    20 1334   BP: 108/70   Pulse: 90   Resp: 24   Temp: 98 °F (36.7 °C)   TempSrc: Oral   SpO2: 93%   Weight: 76 lb 12.8 oz (34.8 kg)   Height: 5' 6\" (1.676 m)      Body mass index is 12.4 kg/m². Physical Exam:  Constitutional: thin, clothing smells of tobacco smoke  Skin: tinea right posterior foot  Head: normocephalic, atraumatic  Eyes: sclera clear, EOMI  Neck: normal range of motion  CV: normal S1, S2, regular rate and rhythm  Respiratory: diminished airflow, clear to auscultation bilaterally with symmetrical effort  Extremities: full range of motion, antalgic gait  Neurology: normal strength and sensation  Psych: active, alert and oriented, affect appropriate       Assessment and orders:     Diagnoses and all orders for this visit:    1. Other emphysema ([address]  -     ipratropium-albuteroL (COMBIVENT RESPIMAT)  mcg/actuation inhaler; USE 1 PUFF BY MOUTH EVERY 4 HOURS as needed. 2. History of pneumonia  -     XR CHEST PA LAT; Future    3.  Dysuria  - AMB POC URINALYSIS DIP STICK AUTO W/O MICRO    4. Back muscle spasm  -     cyclobenzaprine (FLEXERIL) 10 mg tablet; Take 1 Tab by mouth three (3) times daily as needed for Muscle Spasm(s). -     Shower Chair XX jessica; For use during bathing    5. Compression fracture of T12 vertebra, sequela  -     Cane jessica; Use for ambulation assistance    6. MS (multiple sclerosis) ([facility]    7. Tobacco abuse        Plan of care: All available hospital records reviewed. Diagnoses were discussed in detail with patient. Medication risks/benefits/side effects discussed with patient. Strongly advised patient to stop all use of tobacco products. All of the patient's questions were addressed. The patient understands and agrees with our plan of care. The patient knows to call back if they are unsure of or forgets any changes we discussed today or if the symptoms change. The patient received an After-Visit Summary which contains VS, orders, allergy and medication lists. Over half of the 40 minutes face to face with Cayla Perez consisted of review of hospital course, medications, counseling and discussing treatment plans in reference to her dx of pneumonia, COPD, compression fracture and co-morbidities. Patient Care Team:  Cleveland Gusman MD as PCP - General (Family Practice)  Cleveland Gusman MD as PCP - [facility] Empaneled Provider  Victor Hugo Gill MD as Physician (Gastroenterology)  Cleveland Gusman MD (Family Practice)    Follow-up and Dispositions    · Return in about 3 months (around 5/11/2020), or if symptoms worsen or fail to improve. No future appointments.     Signed By: Mil Mcpherson MD     February 16, 2020

## 2020-02-24 DIAGNOSIS — M62.830 BACK MUSCLE SPASM: ICD-10-CM

## 2020-02-25 RX ORDER — CYCLOBENZAPRINE HCL 10 MG
10 TABLET ORAL
Qty: 90 TAB | Refills: 0 | Status: SHIPPED | OUTPATIENT
Start: 2020-02-25 | End: 2020-05-01

## 2020-05-01 ENCOUNTER — VIRTUAL VISIT (OUTPATIENT)
Dept: FAMILY MEDICINE CLINIC | Age: 65
End: 2020-05-01

## 2020-05-01 DIAGNOSIS — Z87.09 HISTORY OF PNEUMOTHORAX: ICD-10-CM

## 2020-05-01 DIAGNOSIS — E43 SEVERE PROTEIN-CALORIE MALNUTRITION (HCC): ICD-10-CM

## 2020-05-01 DIAGNOSIS — Z72.0 TOBACCO ABUSE: ICD-10-CM

## 2020-05-01 DIAGNOSIS — J43.8 OTHER EMPHYSEMA (HCC): Primary | ICD-10-CM

## 2020-05-01 DIAGNOSIS — Z87.01 HISTORY OF PNEUMONIA: ICD-10-CM

## 2020-05-01 RX ORDER — FLUTICASONE FUROATE AND VILANTEROL TRIFENATATE 100; 25 UG/1; UG/1
1 POWDER RESPIRATORY (INHALATION) DAILY
Qty: 1 INHALER | Refills: 0 | COMMUNITY
Start: 2020-05-01 | End: 2020-06-16 | Stop reason: SDUPTHER

## 2020-05-01 RX ORDER — IBUPROFEN 200 MG
1 TABLET ORAL EVERY 24 HOURS
Qty: 30 PATCH | Refills: 0 | Status: SHIPPED | OUTPATIENT
Start: 2020-05-01 | End: 2020-05-31

## 2020-05-01 NOTE — PROGRESS NOTES
Rosita Jack is a 59 y.o. female evaluated via computer/telephone on 20. Patient Identity confirmed by . Video encounter done in lieu of office visit due to extraordinary circumstances. A state of national and state emergency has been declared by the President and the [place] due to the [employer] pandemic. Pursuant to this emergency declaration under the [facility] waiver authority and the N[employer] and [employer] Act, this Virtual  Visit was conducted, with patient's consent, to reduce the patient's risk of exposure to COVID-19 and provide continuity of care for an established patient. Jazmine Dee LPN coordinated virtual visit    Consent:  Patient and/or health care decision maker is aware that that he may receive a bill for this video service, depending on his insurance coverage, and has provided verbal consent to proceed: Yes    Physician Location: Office  Patient Location: Home    CC: transition of care  Information gathered from patient and/or health care decision maker. HPI: Rosita Jack is a 59 y.o. female who was evaluated by synchronous (real-time) audio-video technology from her home, through the [employer] Patient Portal. Patient with second hospitalization this year. She was admitted to [facility] on 20 with d/c on 20 with diagnoses of acute on chronic respiratory failure, right pneumothorax, and pneumonia. She has [address], PT and OT. Patient with hx of COPD, MS, allergic rhinitis, malnutrition and tobacco abuse. She was discharged on nicotine patches, but per [address] says she uses these every other day and smokes 1PPD on days not on the patch. She is on 3 l continuous O2 via nasal cannula. [facility] and has a f/u with pulmonology on 20. She is followed by pain management and is on opioid medication.  Patient says she is feeling better post discharge. Patient denies F/C, HA, dizziness, CP, abdominal pain, dysuria, weakness or paresthesia. She is on a high calorie supplement. BP Readings from Last 3 Encounters:   02/11/20 108/70   10/15/19 127/73   10/01/19 108/60     Wt Readings from Last 3 Encounters:   02/11/20 76 lb 12.8 oz (34.8 kg)   10/15/19 78 lb (35.4 kg)   10/01/19 79 lb (35.8 kg)         Current Outpatient Medications:     fluticasone furoate-vilanteroL (Breo Ellipta) 100-25 mcg/dose inhaler, Take 1 Puff by inhalation daily. , Disp: 1 Inhaler, Rfl: 0    ipratropium-albuteroL (Combivent Respimat)  mcg/actuation inhaler, USE 1 PUFF BY MOUTH EVERY 4 HOURS as needed. , Disp: 4 Each, Rfl: 3    nicotine (NICODERM CQ) 21 mg/24 hr, 1 Patch by TransDERmal route every twenty-four (24) hours for 30 days. , Disp: 30 Patch, Rfl: 0    Shower Chair XX jessica, For use during bathing, Disp: 1 Each, Rfl: 0    Cane jessica, Use for ambulation assistance, Disp: 1 Device, Rfl: 0    ibandronate (BONIVA) 150 mg tablet, Take 150 mg by mouth every thirty (30) days. , Disp: 3 Tab, Rfl: 3    albuterol-ipratropium (DUO-NEB) 2.5 mg-0.5 mg/3 ml nebu, 3 mL by Nebulization route every four (4) hours as needed. , Disp: 90 Nebule, Rfl: 1    Nebulizer & Compressor machine, For use as breathing treatment every 4 - 6 hours as needed. , Disp: 1 Each, Rfl: 0    Nebulizer Accessories kit, For use as breathing treatment every 4 - 6 hours as needed. , Disp: 1 Kit, Rfl: 0    oxyCODONE-acetaminophen (PERCOCET 10)  mg per tablet, Take 1 Tab by mouth., Disp: , Rfl: 0     Allergies   Allergen Reactions    Evista [Raloxifene] Diarrhea and Swelling     Makes throat swell     Doxycycline Swelling     Tongue      Pcn [Penicillins] Swelling     Of the tongue    Spiriva With Handihaler [Tiotropium Bromide] Other (comments)     Chest pain         Patient Active Problem List    Diagnosis Date Noted    Back muscle spasm 06/28/2016    Depression 09/19/2014    Anorexia 09/18/2014    Back pain 07/15/2011    Anxiety 02/09/2011    Tobacco abuse 12/06/2010    Asthma 12/06/2010    COPD (chronic obstructive pulmonary disease) ([facility] 12/06/2010    Hyperlipemia 06/16/2010    Neuropathy     Fibromyalgia     MS (multiple sclerosis) ([facility])         Review of Systems:  Constitutional: Negative for fever, malaise  Resp: see HPI  CV: Negative for chest pain, dizziness or palpitations  GI: Negative for nausea or abdominal pain  MS: Negative for acute myalgias or arthralgias   Neuro: Negative for HA, weakness or paresthesia  Psych: Negative for depression or anxiety     Per [place] nurse  T 97.5  HR 56  SpO2 93% 3L  /60  RR 18  Wt. 81 lbs    Physical Examination:  General: Appears in no acute distress  Head: Normocephalic, atraumatic  Eyes: Sclera clear, EOMI  Nose: nasal canula in place  Neck: Normal range of motion  CV: Per HH nurse: NSR  Respiratory: Per [place] nurse: diminished air flow, Course rattle RLL at site of chest tube placement  Psych: Active, alert and oriented. Affect appropriate     Assessment/Plan:  Details of this discussion including any medical advice provided: Patient advised as a precaution to self isolate at home, practice regular hand washing with soap and warm water and to utilize social distancing in interactions with people outside the home. ICD-10-CM ICD-9-CM    1. Other emphysema ([facility]) J43.8 492.8 ipratropium-albuteroL (Combivent Respimat)  mcg/actuation inhaler   2. History of pneumonia Z87.01 V12.61    3. History of pneumothorax Z87.09 V12.69    4. Severe protein-calorie malnutrition ([facility] E43 262    5. Tobacco abuse Z72.0 305.1      Medications reconciled. Symptomatic therapy suggested: Strongly advised patient to stop all use of tobacco products. Call prn if symptoms persist or worsen. Available hospital records reviewed. Continued HH discussed with [place] nurse during encounter.  Patient with multiple co-morbidities requiring comprehensive review of medical history, medications, prior hospital and OV notes, vitals flow sheets, lab results, medications, and allergies requiring complex medical decision making.        MD GEOFF Caballero & MARÍA ELENA STANLEY [facility]  05/01/20

## 2020-05-28 ENCOUNTER — TELEPHONE (OUTPATIENT)
Dept: FAMILY MEDICINE CLINIC | Age: 65
End: 2020-05-28

## 2020-05-28 NOTE — TELEPHONE ENCOUNTER
Home health nurse called for pt. Pt wants to know if Dr. Radha Erazo could write a letter stating that she is unable to wear a mask in public because she passed out today while she was wearing her mask in [employer]. Pt loss consciousness in [employer] and seconds after the mask was taken off she regain consciousness.     Pt can be contacted once letter has been written

## 2020-05-29 NOTE — TELEPHONE ENCOUNTER
Informed pt that this office will not be writing letters for masks  Offered her an appointment for episode in [employer]   she declined

## 2020-06-10 ENCOUNTER — TELEPHONE (OUTPATIENT)
Dept: FAMILY MEDICINE CLINIC | Age: 65
End: 2020-06-10

## 2020-06-10 NOTE — TELEPHONE ENCOUNTER
[facility] uses Karely Wong in [facility] would like an order standard shower chair.

## 2020-06-11 NOTE — TELEPHONE ENCOUNTER
Reviewing the chart. Shower chair was ordered in February. Called [facility]. Shower chair is ready for . Patient made aware and verbalized understanding.

## 2020-06-16 ENCOUNTER — VIRTUAL VISIT (OUTPATIENT)
Dept: FAMILY MEDICINE CLINIC | Age: 65
End: 2020-06-16

## 2020-06-16 DIAGNOSIS — J43.8 OTHER EMPHYSEMA (HCC): Primary | ICD-10-CM

## 2020-06-16 DIAGNOSIS — Z87.09 HISTORY OF PNEUMOTHORAX: ICD-10-CM

## 2020-06-16 DIAGNOSIS — R60.0 PEDAL EDEMA: ICD-10-CM

## 2020-06-16 DIAGNOSIS — R63.6 UNDERWEIGHT: ICD-10-CM

## 2020-06-16 RX ORDER — FUROSEMIDE 20 MG/1
10 TABLET ORAL DAILY
Qty: 30 TAB | Refills: 1 | Status: SHIPPED | OUTPATIENT
Start: 2020-06-16 | End: 2022-01-28

## 2020-06-16 RX ORDER — FLUTICASONE FUROATE AND VILANTEROL TRIFENATATE 100; 25 UG/1; UG/1
1 POWDER RESPIRATORY (INHALATION) DAILY
Qty: 1 INHALER | Refills: 3 | Status: SHIPPED | OUTPATIENT
Start: 2020-06-16

## 2020-06-21 NOTE — PROGRESS NOTES
Noemi Foss is a 59 y.o. female evaluated via telephone on 20. Patient Identity confirmed by . Telephone encounter done in lieu of office visit due to extraordinary circumstances. A state of national and state emergency has been declared by the President and the [place] due to the [employer] pandemic. Pursuant to the emergency declaration under the [facility] [place] [address] [facility] authority and the [employer] and [employer] Act, this Virtual  Visit was conducted, with patient's consent, to reduce the patient's risk of exposure to COVID-19 and provide continuity of care for an established patient. Nikki Young LPN coordinated virtual visit    Consent:  Patient and/or health care decision maker is aware that he may receive a bill for this telephone encounter, depending on his insurance coverage, and has provided verbal consent to proceed: Yes    Physician Location: Office  Patient Location: Home    CC: f/u [place]  Information gathered from patient and/or health care decision maker. HPI: Noemi Foss is a 59 y.o. female who was evaluated by synchronous (real-time) audio technology from her home. Patient with hospitalization 2 months ago for acute on chronic respiratory failure, right pneumothorax, and pneumonia. She is requesting an extension of HH. Patient with hx of COPD, MS, allergic rhinitis, malnutrition and tobacco abuse. Patient says she has quit tobacco use. She is on a high calorie supplement. She is followed by pain management and is on opioid medication. She complains of pedal edema. Patient denies F/C, HA, dizziness, CP, SOB, abdominal pain, dysuria, weakness or paresthesia. Current Outpatient Medications:     fluticasone furoate-vilanteroL (Breo Ellipta) 100-25 mcg/dose inhaler, Take 1 Puff by inhalation daily. , Disp: 1 Inhaler, Rfl: 3    furosemide (LASIX) 20 mg tablet, Take 0.5 Tabs by mouth daily. Indications: visible water retention, Disp: 30 Tab, Rfl: 1    ipratropium-albuteroL (Combivent Respimat)  mcg/actuation inhaler, USE 1 PUFF BY MOUTH EVERY 4 HOURS as needed. , Disp: 4 Each, Rfl: 3    Shower Chair XX jessica, For use during bathing, Disp: 1 Each, Rfl: 0    Cane jessica, Use for ambulation assistance, Disp: 1 Device, Rfl: 0    ibandronate (BONIVA) 150 mg tablet, Take 150 mg by mouth every thirty (30) days. , Disp: 3 Tab, Rfl: 3    albuterol-ipratropium (DUO-NEB) 2.5 mg-0.5 mg/3 ml nebu, 3 mL by Nebulization route every four (4) hours as needed. , Disp: 90 Nebule, Rfl: 1    Nebulizer & Compressor machine, For use as breathing treatment every 4 - 6 hours as needed. , Disp: 1 Each, Rfl: 0    Nebulizer Accessories kit, For use as breathing treatment every 4 - 6 hours as needed. , Disp: 1 Kit, Rfl: 0    oxyCODONE-acetaminophen (PERCOCET 10)  mg per tablet, Take 1 Tab by mouth., Disp: , Rfl: 0     Allergies   Allergen Reactions    Evista [Raloxifene] Diarrhea and Swelling     Makes throat swell     Doxycycline Swelling     Tongue      Pcn [Penicillins] Swelling     Of the tongue    Spiriva With Handihaler [Tiotropium Bromide] Other (comments)     Chest pain         Patient Active Problem List    Diagnosis Date Noted    Back muscle spasm 06/28/2016    Depression 09/19/2014    Anorexia 09/18/2014    Back pain 07/15/2011    Anxiety 02/09/2011    Tobacco abuse 12/06/2010    Asthma 12/06/2010    COPD (chronic obstructive pulmonary disease) ([facility] 12/06/2010    Hyperlipemia 06/16/2010    Neuropathy     Fibromyalgia     [facility] (multiple sclerosis) ([facility]         Review of Systems:  Constitutional: Negative for fatigue, malaise  Resp: Negative for cough, wheezing or SOB  CV: Negative for chest pain, dizziness or palpitations  GI: Negative for nausea or abdominal pain  MS: Negative for acute myalgias or arthralgias   Neuro: Negative for HA, weakness or paresthesia  Psych: Negative for depression or anxiety       Assessment/Plan:  Details of this discussion including any medical advice provided:       ICD-10-CM ICD-9-CM    1. Other emphysema (HCC) J43.8 492.8 fluticasone furoate-vilanteroL (Breo Ellipta) 100-25 mcg/dose inhaler      REFERRAL TO HOME HEALTH   2. Pedal edema R60.0 782.3 REFERRAL TO HOME HEALTH   3. History of pneumothorax Z87.09 V12.69 REFERRAL TO HOME HEALTH   4. Underweight R63.6 783.22 REFERRAL TO HOME HEALTH       Symptomatic therapy suggested: call prn if symptoms persist or worsen. Total Time: minutes: 21-30 minutes was spent on phone discussing above problems and plan. Patient medical history, prior OV notes, vitals flow sheet, lab results, medications, and allergies were reviewed during this encounter. For phone encounters:  I affirm this is a patient initiated episode with an established Patient who has not had a related appointment within my department in the past 7 days or scheduled within the next 24 hours.     Note: not billable if this call serves to triage the patient into an appointment for the relevant concern    MD GEOFF Giron & MARÍA ELENA STANLEY [facility]  06/21/20

## 2020-06-23 ENCOUNTER — TELEPHONE (OUTPATIENT)
Dept: FAMILY MEDICINE CLINIC | Age: 65
End: 2020-06-23

## 2020-06-23 RX ORDER — SERTRALINE HYDROCHLORIDE 25 MG/1
25 TABLET, FILM COATED ORAL DAILY
Qty: 30 TAB | Refills: 2 | Status: SHIPPED | OUTPATIENT
Start: 2020-06-23 | End: 2020-09-17 | Stop reason: SDUPTHER

## 2020-06-23 NOTE — TELEPHONE ENCOUNTER
CHET/[facility]/[phone]/They will be continuing care for the Pt. She is very, very depressed and tearful over the past few weeks. At first she did not want to receive Rx but not she will accept Rx for her depression. Paola Solorzano is asking for a call back please.

## 2020-07-08 ENCOUNTER — TELEPHONE (OUTPATIENT)
Dept: FAMILY MEDICINE CLINIC | Age: 65
End: 2020-07-08

## 2020-07-08 NOTE — TELEPHONE ENCOUNTER
GUS/[facility]// Pt is on Zoloft last week. Three days into the medication he started with explosive diarrhea, nausea, stomach pain and excessive sleepiness with no desire to eat. She took her off of it and within three days she was back to normal. She does not want to go back on it because of the side affects. She only has taken 2 doses of the lasix in two weeks and she is doing great with it.

## 2020-07-14 ENCOUNTER — TELEPHONE (OUTPATIENT)
Dept: FAMILY MEDICINE CLINIC | Age: 65
End: 2020-07-14

## 2020-07-14 NOTE — TELEPHONE ENCOUNTER
Vilma/[facility]//Pt took some Singulair. Pt took herself off of this medication. She does not remember when she stopped taking it or why she started back taking it. She started taking it again and now she is hallucinating (seeing cats and movements), uncontrollable body movements, increase agitation, and really down about how she is feeling. She took it today as well. Gayathri Mark has taken the medication and the son is going to dispose of it. Please list this medication as an allergen. She is going to call the Pulmonologist as well.

## 2020-07-21 ENCOUNTER — TELEPHONE (OUTPATIENT)
Dept: FAMILY MEDICINE CLINIC | Age: 65
End: 2020-07-21

## 2020-07-22 ENCOUNTER — VIRTUAL VISIT (OUTPATIENT)
Dept: FAMILY MEDICINE CLINIC | Age: 65
End: 2020-07-22

## 2020-07-22 DIAGNOSIS — G89.29 CHRONIC BILATERAL THORACIC BACK PAIN: ICD-10-CM

## 2020-07-22 DIAGNOSIS — Z87.81 HISTORY OF VERTEBRAL COMPRESSION FRACTURE: ICD-10-CM

## 2020-07-22 DIAGNOSIS — M54.50 ACUTE BILATERAL LOW BACK PAIN WITHOUT SCIATICA: Primary | ICD-10-CM

## 2020-07-22 DIAGNOSIS — M81.0 AGE RELATED OSTEOPOROSIS, UNSPECIFIED PATHOLOGICAL FRACTURE PRESENCE: ICD-10-CM

## 2020-07-22 DIAGNOSIS — M54.6 CHRONIC BILATERAL THORACIC BACK PAIN: ICD-10-CM

## 2020-07-22 RX ORDER — LIDOCAINE 50 MG/G
PATCH TOPICAL
COMMUNITY
Start: 2020-07-21

## 2020-07-22 RX ORDER — CYCLOBENZAPRINE HCL 5 MG
5 TABLET ORAL
COMMUNITY

## 2020-07-22 NOTE — PROGRESS NOTES
1. Have you been to the ER, urgent care clinic since your last visit? Hospitalized since your last visit? No    2. Have you seen or consulted any other health care providers outside of the [address] [place] [address] since your last visit? Include any pap smears or colon screening.  No  Reviewed record in preparation for visit and have necessary documentation  opportunity was given for questions  Goals that were addressed and/or need to be completed during or after this appointment include    Health Maintenance Due   Topic Date Due    DTaP/Tdap/Td series (1 - Tdap) 07/15/1976    Shingrix Vaccine Age 50> (1 of 2) 07/15/2005    Breast Cancer Screen Mammogram  07/24/2015    PAP AKA CERVICAL CYTOLOGY  10/22/2017    GLAUCOMA SCREENING Q2Y  07/15/2020    Bone Densitometry (Dexa) Screening  07/15/2020    Medicare Yearly Exam  07/22/2020

## 2020-07-22 NOTE — PROGRESS NOTES
Rica Ferrera is a 72 y.o. female evaluated via telephone on 20. Patient Identity confirmed by . Telephone encounter done in lieu of office visit due to extraordinary circumstances. A state of national and state emergency has been declared by the President and the [place] due to the [employer] pandemic. Pursuant to the emergency declaration under the [facility] [place] [address] [facility] authority and the [employer] Act, this Virtual  Visit was conducted, with patient's consent, to reduce the patient's risk of exposure to COVID-19 and provide continuity of care for an established patient. Tyson Sommers LPN coordinated virtual visit    Consent:  Patient and/or health care decision maker is aware that he/she may receive a bill for this telephone encounter, depending on his insurance coverage, and has provided verbal consent to proceed: Yes    Physician Location: Office  Patient Location: Home    CC: LBP  Information gathered from patient and/or health care decision maker. HPI: Rica Ferrera is a 72 y.o. female who was evaluated by synchronous (real-time) audio technology from his/her home. Patient complains of acute on chronic back pain. She admits to trying to move a couch in her home. Patient with hx of COPD, MS, osteoporosis, allergic rhinitis, malnutrition and tobacco abuse. She is on a high calorie supplement. She is followed by pain management and is on opioid medication. Patient denies F/C, HA, dizziness, CP, SOB, abdominal pain, dysuria, weakness or paresthesia. Current Outpatient Medications:     fluticasone furoate-vilanteroL (Breo Ellipta) 100-25 mcg/dose inhaler, Take 1 Puff by inhalation daily. , Disp: 1 Inhaler, Rfl: 3    ipratropium-albuteroL (Combivent Respimat)  mcg/actuation inhaler, USE 1 PUFF BY MOUTH EVERY 4 HOURS as needed. , Disp: 4 Each, Rfl: 3    Shower Chair XX jessica, For use during bathing, Disp: 1 Each, Rfl: 0    Cane jessica, Use for ambulation assistance, Disp: 1 Device, Rfl: 0    ibandronate (BONIVA) 150 mg tablet, Take 150 mg by mouth every thirty (30) days. , Disp: 3 Tab, Rfl: 3    oxyCODONE-acetaminophen (PERCOCET 10)  mg per tablet, Take 1 Tab by mouth., Disp: , Rfl: 0    cyclobenzaprine (FLEXERIL) 5 mg tablet, 5 mg., Disp: , Rfl:     lidocaine (LIDODERM) 5 %, , Disp: , Rfl:     sertraline (ZOLOFT) 25 mg tablet, Take 1 Tab by mouth daily. , Disp: 30 Tab, Rfl: 2    furosemide (LASIX) 20 mg tablet, Take 0.5 Tabs by mouth daily. Indications: visible water retention, Disp: 30 Tab, Rfl: 1    albuterol-ipratropium (DUO-NEB) 2.5 mg-0.5 mg/3 ml nebu, 3 mL by Nebulization route every four (4) hours as needed. , Disp: 90 Nebule, Rfl: 1    Nebulizer & Compressor machine, For use as breathing treatment every 4 - 6 hours as needed. , Disp: 1 Each, Rfl: 0    Nebulizer Accessories kit, For use as breathing treatment every 4 - 6 hours as needed. , Disp: 1 Kit, Rfl: 0     Allergies   Allergen Reactions    Evista [Raloxifene] Diarrhea and Swelling     Makes throat swell     Doxycycline Swelling     Tongue      Pcn [Penicillins] Swelling     Of the tongue    Singulair [Montelukast] Anxiety    Spiriva With Handihaler [Tiotropium Bromide] Other (comments)     Chest pain         Patient Active Problem List    Diagnosis Date Noted    Back muscle spasm 06/28/2016    Depression 09/19/2014    Anorexia 09/18/2014    Back pain 07/15/2011    Anxiety 02/09/2011    Tobacco abuse 12/06/2010    Asthma 12/06/2010    [facility] 12/06/2010    Hyperlipemia 06/16/2010    Neuropathy     Fibromyalgia     MS (multiple sclerosis) ([facility])         Review of Systems:  Constitutional: Negative for fatigue, malaise  Resp: Negative for cough, wheezing or SOB  CV: Negative for chest pain, dizziness or palpitations  GI: Negative for nausea or abdominal pain  MS: see HPI  Neuro: Negative for HA, weakness or paresthesia        Assessment/Plan:  Details of this discussion including any medical advice provided: Patient advised as a precaution to self isolate at home, practice regular hand washing with soap and warm water and to wear a mask and utilize social distancing when necessary to be out in public places. ICD-10-CM ICD-9-CM    1. Acute bilateral low back pain without sciatica  M54.5 724.2      338.19    2. Chronic bilateral thoracic back pain  M54.6 724.1     G89.29 338.29    3. Age related osteoporosis, unspecified pathological fracture presence  M81.0 733.01    4. History of vertebral compression fracture  Z87.81 V15.51      Advised patient that she needed an in office evaluation  Symptomatic therapy suggested: rest and call prn if symptoms persist or worsen. Total Time: minutes: 11-20 minutes was spent on phone discussing above problems and plan. Patient medical history, prior OV notes, vitals flow sheet, lab results, medications, and allergies were reviewed during this encounter. For phone encounters:  I affirm this is a patient initiated episode with an established Patient who has not had a related appointment within my department in the past 7 days or scheduled within the next 24 hours.     Note: not billable if this call serves to triage the patient into an appointment for the relevant concern    MD GEOFF Izaguirre & MARÍA ELENA STANLEY [facility]  07/26/20

## 2020-07-23 ENCOUNTER — OFFICE VISIT (OUTPATIENT)
Dept: FAMILY MEDICINE CLINIC | Age: 65
End: 2020-07-23

## 2020-07-23 VITALS
TEMPERATURE: 97.9 F | HEIGHT: 66 IN | RESPIRATION RATE: 18 BRPM | BODY MASS INDEX: 13.69 KG/M2 | DIASTOLIC BLOOD PRESSURE: 84 MMHG | WEIGHT: 85.2 LBS | SYSTOLIC BLOOD PRESSURE: 142 MMHG | HEART RATE: 76 BPM | OXYGEN SATURATION: 92 %

## 2020-07-23 DIAGNOSIS — M80.00XA AGE-RELATED OSTEOPOROSIS WITH CURRENT PATHOLOGICAL FRACTURE, INITIAL ENCOUNTER: ICD-10-CM

## 2020-07-23 DIAGNOSIS — R63.6 UNDERWEIGHT: ICD-10-CM

## 2020-07-23 DIAGNOSIS — S32.040A COMPRESSION FRACTURE OF L4 VERTEBRA, INITIAL ENCOUNTER (HCC): ICD-10-CM

## 2020-07-23 DIAGNOSIS — M54.50 ACUTE BILATERAL LOW BACK PAIN WITHOUT SCIATICA: Primary | ICD-10-CM

## 2020-07-23 DIAGNOSIS — J43.8 OTHER EMPHYSEMA (HCC): ICD-10-CM

## 2020-07-23 RX ORDER — KETOROLAC TROMETHAMINE 30 MG/ML
30 INJECTION, SOLUTION INTRAMUSCULAR; INTRAVENOUS ONCE
Qty: 1 VIAL | Refills: 0
Start: 2020-07-23 | End: 2020-07-23

## 2020-07-23 NOTE — PROGRESS NOTES
1. Have you been to the ER, urgent care clinic since your last visit? Hospitalized since your last visit? No    2. Have you seen or consulted any other health care providers outside of the [address] [place] [address] since your last visit? Include any pap smears or colon screening.  No  Reviewed record in preparation for visit and have necessary documentation  opportunity was given for questions  Goals that were addressed and/or need to be completed during or after this appointment include    Health Maintenance Due   Topic Date Due    DTaP/Tdap/Td series (1 - Tdap) 07/15/1976    Shingrix Vaccine Age 50> (1 of 2) 07/15/2005    Breast Cancer Screen Mammogram  07/24/2015    PAP AKA CERVICAL CYTOLOGY  10/22/2017    GLAUCOMA SCREENING Q2Y  07/15/2020    Bone Densitometry (Dexa) Screening  07/15/2020    Medicare Yearly Exam  07/22/2020

## 2020-07-27 NOTE — PROGRESS NOTES
Progress Note    Patient: Matt Hale MRN: [id number]  SSN: [id number]    YOB: 1955  Age: 72 y.o. Sex: female        Subjective:     Chief Complaint   Patient presents with    Back Pain       HPI: she is a 72y.o. year old female  who presents for evaluation of acute on chronic back pain. Pain began after moving a couch in her home. Patient with hx of COPD, MS, osteoporosis, allergic rhinitis, malnutrition and tobacco abuse. She is on a high calorie supplement. She is followed by pain management and is on opioid medication. She says this has been ineffective for her LBP. Patient denies F/C, HA, dizziness, CP, SOB, abdominal pain, dysuria, weakness or paresthesia. BP Readings from Last 3 Encounters:   07/23/20 142/84   02/11/20 108/70   10/15/19 127/73     Wt Readings from Last 3 Encounters:   07/23/20 85 lb 3.2 oz (38.6 kg)   02/11/20 76 lb 12.8 oz (34.8 kg)   10/15/19 78 lb (35.4 kg)     Body mass index is 13.75 kg/m². Encounter Diagnoses   Name Primary?  Acute bilateral low back pain without sciatica Yes    Compression fracture [facility] L4 vertebra[facility]     Age-related osteoporosis with current pathological fracture, initial encounter     Other emphysema ([facility]     Underweight          Current and past medical information:    Current Medications after this visit[de-identified]     Current Outpatient Medications   Medication Sig    cyclobenzaprine (FLEXERIL) 5 mg tablet 5 mg.  lidocaine (LIDODERM) 5 %     sertraline (ZOLOFT) 25 mg tablet Take 1 Tab by mouth daily.  fluticasone furoate-vilanteroL (Breo Ellipta) 100-25 mcg/dose inhaler Take 1 Puff by inhalation daily.  furosemide (LASIX) 20 mg tablet Take 0.5 Tabs by mouth daily. Indications: visible water retention    ipratropium-albuteroL (Combivent Respimat)  mcg/actuation inhaler USE 1 PUFF BY MOUTH EVERY 4 HOURS as needed.     Shower Chair XX jessica For use during bathing    Cane jessica Use for ambulation assistance    ibandronate (BONIVA) 150 mg tablet Take 150 mg by mouth every thirty (30) days.  albuterol-ipratropium (DUO-NEB) 2.5 mg-0.5 mg/3 ml nebu 3 mL by Nebulization route every four (4) hours as needed.  Nebulizer & Compressor machine For use as breathing treatment every 4 - 6 hours as needed.  Nebulizer Accessories kit For use as breathing treatment every 4 - 6 hours as needed.  oxyCODONE-acetaminophen (PERCOCET 10)  mg per tablet Take 1 Tab by mouth. No current facility-administered medications for this visit.         Patient Active Problem List    Diagnosis Date Noted    Back muscle spasm 06/28/2016    Depression 09/19/2014    Anorexia 09/18/2014    Back pain 07/15/2011    Anxiety 02/09/2011    Tobacco abuse 12/06/2010    Asthma 12/06/2010    COPD (chronic obstructive pulmonary disease) ([facility] 12/06/2010    Hyperlipemia 06/16/2010    Neuropathy     Fibromyalgia     [facility]        Past Medical History:   Diagnosis Date    Anxiety     Asthma 12/6/2010    COPD (chronic obstructive pulmonary disease) ([facility] 12/6/2010    Depression 9/19/2014    Fibromyalgia     Hyperlipemia 6/16/2010    [facility] ([facility] sclerosis) ([facility]     Neuropathy     Syncope        Allergies   Allergen Reactions    Evista [Raloxifene] Diarrhea and Swelling     Makes throat swell     Doxycycline Swelling     Tongue      Pcn [Penicillins] Swelling     Of the tongue    Singulair [Montelukast] Anxiety    Spiriva With Handihaler [Tiotropium Bromide] Other (comments)     Chest pain        Past Surgical History:   Procedure Laterality Date    HX APPENDECTOMY         Social History     Socioeconomic History    Marital status:      Spouse name: Not on file    Number of children: Not on file    Years of education: Not on file    Highest education level: Not on file   Tobacco Use    Smoking status: Former Smoker     Packs/day: 0.50     Years: 40.00     Pack years: 20.00 Last attempt to quit: 2018     Years since quittin.2    Smokeless tobacco: Never Used    Tobacco comment: states has been trying to stop-given stop smoking information   Substance and Sexual Activity    Alcohol use: No    Drug use: No         Objective:     Review of Systems:  Constitutional: Negative for fatigue or malaise  Derm: Negative for rash or lesion  HEENT: Negative for acute hearing or vision changes  Cardiovascular: Negative for dizziness, chest pain or palpitations  Respiratory: see HPI  Gastrointestinal: Negative for nausea or abdominal pain  Genital/urinary: Negative for dysuria or voiding dysfunction  Musculoskeletal: see HPI  Neurological: Negative for headache, weakness or paresthesia  Psychological: Negative for depression or anxiety      Vitals:    20 0814   BP: 142/84   Pulse: 76   Resp: 18   Temp: 97.9 °F (36.6 °C)   TempSrc: Oral   SpO2: 92%   Weight: 85 lb 3.2 oz (38.6 kg)   Height: 5' 6\" (1.676 m)      Body mass index is 13.75 kg/m². Physical Exam:  Constitutional: thin, appears uncomfortable  Skin: warm and dry, normal tone and turgor  Head: normocephalic, atraumatic  Eyes: sclera clear, EOMI  Neck: normal range of motion  CV: normal S1, S2, regular rate and rhythm  Respiratory: diminished airflow, clear to auscultation bilaterally with symmetrical effort  Back: b/l lumbar paraspinal muscle TTP, lumbar vertebral TTP, decreased ROM  Extremities: full range of motion, antalgic gait  Neurology: normal strength and sensation  Psych: active, alert and oriented, affect appropriate     Lumbar xray: reviewed independently by myself, L4 compression fracture, DDD    Assessment and orders:     Diagnoses and all orders for this visit:    1. Acute bilateral low back pain without sciatica  -     XR SPINE LUMB 2 OR 3 V; Future  -     REFERRAL TO ORTHOPEDICS  -     ketorolac (TORADOL) 30 mg/mL (1 mL) injection; 1 mL by IntraMUSCular route once for 1 dose.   -     KETOROLAC TROMETHAMINE INJ  -     MS THER/PROPH/DIAG INJECTION, SUBCUT/IM    2. Compression fracture of L4 vertebra, initial encounter ([facility])  -     REFERRAL TO ORTHOPEDICS    3. Age-related osteoporosis with current pathological fracture, initial encounter  -     REFERRAL TO ORTHOPEDICS    4. Other emphysema ([address]    5. Underweight      Plan of care:  Patient advised as a precaution to self isolate at home, practice regular hand washing with soap and warm water and to wear a mask and utilize social distancing when necessary to be out in public places. Diagnoses were discussed in detail with patient. Medication risks/benefits/side effects discussed with patient. All of the patient's questions were addressed. The patient understands and agrees with our plan of care. The patient knows to call back if they are unsure of or forgets any changes we discussed today or if the symptoms change. The patient received an After-Visit Summary which contains VS, orders, allergy and medication lists. Patient Care Team:  Riri Purcell MD as PCP - General (Family Medicine)  Riri Purcell MD as PCP - [facility] Empaneled Provider  Victoria Pinon MD as Physician (Gastroenterology)  Riri Purcell MD (Family Medicine)    Follow-up and Dispositions    · Return in about 4 weeks (around 8/20/2020), or if symptoms worsen or fail to improve.          Future Appointments   Date Time Provider Federico Sales   8/3/2020 10:00 AM [facility]       Signed By: Gary Echeverria MD     July 26, 2020

## 2020-08-05 ENCOUNTER — TELEPHONE (OUTPATIENT)
Dept: FAMILY MEDICINE CLINIC | Age: 65
End: 2020-08-05

## 2020-08-05 NOTE — TELEPHONE ENCOUNTER
[id number] called to report that pt will be out of service area next week so pt will not receive services next week.

## 2020-08-18 RX ORDER — PREDNISONE 10 MG/1
10 TABLET ORAL 2 TIMES DAILY
Qty: 10 TAB | Refills: 0 | Status: SHIPPED | OUTPATIENT
Start: 2020-08-18 | End: 2020-11-06 | Stop reason: SDUPTHER

## 2020-08-18 NOTE — TELEPHONE ENCOUNTER
Sarath Rubioauvais called to report that pt was out of service area last week. Pt went to [facility]. Pt is experiencing SOB, Poor endurance, and breathing issues. No cough or fever. Sarath Colunga states that she can not go see pt until she gets her covid-19 test. Sarath Colunga wants to know if provider would send a Rx for prednisone to possibly help pt breath better. She believes it could be a COPD exacerbation. Pt also have been experiencing muscle cramps.      Call Back number:[phone]

## 2020-08-19 NOTE — TELEPHONE ENCOUNTER
Sol Angelucci called and made aware of prescription sent to pharmacy, Sol Angelucci was going to find out if patient got covid testing and call the office back.

## 2020-08-21 ENCOUNTER — TELEPHONE (OUTPATIENT)
Dept: FAMILY MEDICINE CLINIC | Age: 65
End: 2020-08-21

## 2020-08-21 NOTE — TELEPHONE ENCOUNTER
Pt thinks that she may have some broken ribs. She fell a few days ago and is now experiencing pain. Pt states that she does not have a way to go to the hospital. She is requesting a call back as soon as possible.

## 2020-08-21 NOTE — TELEPHONE ENCOUNTER
Patient on oxycodone prescribed by pain management. Can prescribe antiinflammatory which should help with her pain.

## 2020-08-21 NOTE — TELEPHONE ENCOUNTER
Patient is requesting prescription of tylenol. She is taking aleve. She also states she will go to the Er next week for the pain. She is transitioning over to a different pain management doctor and no longer sees the pain management in [place] [address]. I did educate her that if pain is bad enough she should not wait until next week to go to Er.

## 2020-08-24 RX ORDER — ACETAMINOPHEN 325 MG/1
650 TABLET ORAL
Qty: 60 TAB | Refills: 1 | Status: SHIPPED | OUTPATIENT
Start: 2020-08-24

## 2020-09-04 ENCOUNTER — TELEPHONE (OUTPATIENT)
Dept: FAMILY MEDICINE CLINIC | Age: 65
End: 2020-09-04

## 2020-09-11 ENCOUNTER — VIRTUAL VISIT (OUTPATIENT)
Dept: FAMILY MEDICINE CLINIC | Age: 65
End: 2020-09-11
Payer: MEDICARE

## 2020-09-11 DIAGNOSIS — F32.A DEPRESSION, UNSPECIFIED DEPRESSION TYPE: ICD-10-CM

## 2020-09-11 DIAGNOSIS — G89.29 CHRONIC BILATERAL THORACIC BACK PAIN: Primary | ICD-10-CM

## 2020-09-11 DIAGNOSIS — R63.6 UNDERWEIGHT: ICD-10-CM

## 2020-09-11 DIAGNOSIS — Z13.39 SCREENING FOR ALCOHOLISM: ICD-10-CM

## 2020-09-11 DIAGNOSIS — Z00.00 MEDICARE ANNUAL WELLNESS VISIT, SUBSEQUENT: ICD-10-CM

## 2020-09-11 DIAGNOSIS — Z87.81 HISTORY OF VERTEBRAL COMPRESSION FRACTURE: ICD-10-CM

## 2020-09-11 DIAGNOSIS — J43.8 OTHER EMPHYSEMA (HCC): ICD-10-CM

## 2020-09-11 DIAGNOSIS — M54.6 CHRONIC BILATERAL THORACIC BACK PAIN: Primary | ICD-10-CM

## 2020-09-11 PROCEDURE — G0439 PPPS, SUBSEQ VISIT: HCPCS | Performed by: FAMILY MEDICINE

## 2020-09-11 PROCEDURE — 99442 PR PHYS/QHP TELEPHONE EVALUATION 11-20 MIN: CPT | Performed by: FAMILY MEDICINE

## 2020-09-11 NOTE — PROGRESS NOTES
Nena Boyle is a 72 y.o. female evaluated via telephone on 20. Patient Identity confirmed by . Telephone encounter done in lieu of office visit due to extraordinary circumstances. A state of national and state emergency has been declared by the President and the [place] due to the [employer] pandemic. Pursuant to the emergency declaration under the [facility] [address] authority and the [employer] Act, this Virtual  Visit was conducted, with patient's consent, to reduce the patient's risk of exposure to COVID-19 and provide continuity of care for an established patient. Brina White LPN coordinated virtual visit    Consent:  Patient and/or health care decision maker is aware that he may receive a bill for this telephone encounter, depending on his insurance coverage, and has provided verbal consent to proceed: Yes    Physician Location: Office  Patient Location: Home    CC: f/u [place]  Information gathered from patient and/or health care decision maker. HPI: Nena Boyle is a 72 y.o. female who was evaluated by synchronous (real-time) audio technology from her home. Patient with hx of COPD, MS, allergic rhinitis, malnutrition and tobacco abuse. She is requesting an extension of HH. She says she is unable to manage her medications. She is followed by pain management and is on opioid medication. Patient denies F/C, HA, dizziness, CP, SOB, abdominal pain, dysuria, acute weakness or paresthesia.        Current Outpatient Medications:     acetaminophen (TYLENOL) 325 mg tablet, Take 2 Tabs by mouth every six (6) hours as needed for Pain., Disp: 60 Tab, Rfl: 1    predniSONE (DELTASONE) 10 mg tablet, Take 10 mg by mouth two (2) times a day., Disp: 10 Tab, Rfl: 0    cyclobenzaprine (FLEXERIL) 5 mg tablet, 5 mg., Disp: , Rfl:     lidocaine (LIDODERM) 5 %, , Disp: , Rfl:     fluticasone furoate-vilanteroL (Breo Ellipta) 100-25 mcg/dose inhaler, Take 1 Puff by inhalation daily. , Disp: 1 Inhaler, Rfl: 3    furosemide (LASIX) 20 mg tablet, Take 0.5 Tabs by mouth daily. Indications: visible water retention, Disp: 30 Tab, Rfl: 1    ipratropium-albuteroL (Combivent Respimat)  mcg/actuation inhaler, USE 1 PUFF BY MOUTH EVERY 4 HOURS as needed. , Disp: 4 Each, Rfl: 3    Shower Chair XX jessica, For use during bathing, Disp: 1 Each, Rfl: 0    Cane jessica, Use for ambulation assistance, Disp: 1 Device, Rfl: 0    ibandronate (BONIVA) 150 mg tablet, Take 150 mg by mouth every thirty (30) days. , Disp: 3 Tab, Rfl: 3    albuterol-ipratropium (DUO-NEB) 2.5 mg-0.5 mg/3 ml nebu, 3 mL by Nebulization route every four (4) hours as needed. , Disp: 90 Nebule, Rfl: 1    Nebulizer & Compressor machine, For use as breathing treatment every 4 - 6 hours as needed. , Disp: 1 Each, Rfl: 0    Nebulizer Accessories kit, For use as breathing treatment every 4 - 6 hours as needed. , Disp: 1 Kit, Rfl: 0    oxyCODONE-acetaminophen (PERCOCET 10)  mg per tablet, Take 1 Tab by mouth., Disp: , Rfl: 0    sertraline (ZOLOFT) 25 mg tablet, Take 1 Tab by mouth daily. , Disp: 90 Tab, Rfl: 1     Allergies   Allergen Reactions    Evista [Raloxifene] Diarrhea and Swelling     Makes throat swell     Doxycycline Swelling     Tongue      Pcn [Penicillins] Swelling     Of the tongue    Singulair [Montelukast] Anxiety    Spiriva With Handihaler [Tiotropium Bromide] Other (comments)     Chest pain         Patient Active Problem List    Diagnosis Date Noted    Back muscle spasm 06/28/2016    Depression 09/19/2014    Anorexia 09/18/2014    Back pain 07/15/2011    Anxiety 02/09/2011    Tobacco abuse 12/06/2010    Asthma 12/06/2010    COPD (chronic obstructive pulmonary disease) ([facility] [address] 12/06/2010    Hyperlipemia 06/16/2010    Neuropathy     Fibromyalgia     MS (multiple sclerosis) (HCC)         Review of Systems:  Constitutional: Negative for fatigue, malaise  Resp: see HPI, Negative for cough, wheezing or SOB  CV: Negative for chest pain, dizziness or palpitations  GI: Negative for nausea or abdominal pain  MS: see HPI   Neuro: Negative for HA, weakness or paresthesia  Psych: Negative for depression or anxiety       Assessment/Plan:  Details of this discussion including any medical advice provided: Patient advised as a precaution to stay at home, practice regular hand washing with soap and warm water and to wear a mask and utilize social distancing when necessary to be out in public places. ICD-10-CM ICD-9-CM    1. Chronic bilateral thoracic back pain  M54.6 724.1 REFERRAL TO HOME HEALTH    G89.29 338.29    2. History of vertebral compression fracture  Z87.81 V15.51    3. Other emphysema ([address]  J43.8 492.8 [facility]   4. Underweight  R63.6 783.22 REFERRAL TO HOME HEALTH   5. Depression, unspecified depression type  F32.9 [facility]       Symptomatic therapy suggested: call prn if symptoms persist or worsen. Total Time: minutes: 21-30 minutes was spent on phone discussing above problems and plan. Patient medical history, prior OV notes, vitals flow sheet, lab results, medications, and allergies were reviewed during this encounter. For phone encounters:  I affirm this is a patient initiated episode with an established Patient who has not had a related appointment within my department in the past 7 days or scheduled within the next 24 hours. Note: not billable if this call serves to triage the patient into an appointment for the relevant concern    MD GEOFF Landaverde & MARÍA ELENA STANLEY [facility]  09/18/20     The following Annual Medicare Wellness Exam is distinct and separate from the medical evaluation and decision making.       This is the Subsequent Medicare Annual Wellness Exam, performed 12 months or more after the Initial AWV or the last Subsequent AWV    I have reviewed the patient's medical history in detail and updated the computerized patient record. History     Patient Active Problem List   Diagnosis Code    Neuropathy G62.9    Fibromyalgia M79.7    MS (multiple sclerosis) ([facility] G35    Hyperlipemia E78.5    Tobacco abuse Z72.0    Asthma J45.909    COPD (chronic obstructive pulmonary disease) ([facility] J44.9    Anxiety F41.9    Back pain M54.9    Anorexia R63.0    Depression F32.9    Back muscle spasm M62.830     Past Medical History:   Diagnosis Date    Anxiety     Asthma 12/6/2010    COPD (chronic obstructive pulmonary disease) ([facility] 12/6/2010    Depression 9/19/2014    Fibromyalgia     Hyperlipemia 6/16/2010    MS (multiple sclerosis) ([facility]     Neuropathy     Syncope       Past Surgical History:   Procedure Laterality Date    HX APPENDECTOMY       Current Outpatient Medications   Medication Sig Dispense Refill    acetaminophen (TYLENOL) 325 mg tablet Take 2 Tabs by mouth every six (6) hours as needed for Pain. 60 Tab 1    predniSONE (DELTASONE) 10 mg tablet Take 10 mg by mouth two (2) times a day. 10 Tab 0    cyclobenzaprine (FLEXERIL) 5 mg tablet 5 mg.  lidocaine (LIDODERM) 5 %       fluticasone furoate-vilanteroL (Breo Ellipta) 100-25 mcg/dose inhaler Take 1 Puff by inhalation daily. 1 Inhaler 3    furosemide (LASIX) 20 mg tablet Take 0.5 Tabs by mouth daily. Indications: visible water retention 30 Tab 1    ipratropium-albuteroL (Combivent Respimat)  mcg/actuation inhaler USE 1 PUFF BY MOUTH EVERY 4 HOURS as needed. 4 Each 3    Shower Chair XX jessica For use during bathing 1 Each 0    Cane jessica Use for ambulation assistance 1 Device 0    ibandronate (BONIVA) 150 mg tablet Take 150 mg by mouth every thirty (30) days. 3 Tab 3    albuterol-ipratropium (DUO-NEB) 2.5 mg-0.5 mg/3 ml nebu 3 mL by Nebulization route every four (4) hours as needed.  90 Nebule 1    Nebulizer & Compressor machine For use as breathing treatment every 4 - 6 hours as needed. 1 Each 0    Nebulizer Accessories kit For use as breathing treatment every 4 - 6 hours as needed. 1 Kit 0    oxyCODONE-acetaminophen (PERCOCET 10)  mg per tablet Take 1 Tab by mouth.  0    sertraline (ZOLOFT) 25 mg tablet Take 1 Tab by mouth daily.  90 Tab 1     Allergies   Allergen Reactions    Evista [Raloxifene] Diarrhea and Swelling     Makes throat swell     Doxycycline Swelling     Tongue      Pcn [Penicillins] Swelling     Of the tongue    Singulair [Montelukast] Anxiety    Spiriva With Handihaler [Tiotropium Bromide] Other (comments)     Chest pain        Family History   Problem Relation Age of Onset    Hypertension Father     Alcohol abuse Father     Kidney Disease Mother     Lung Disease Sister         COPD    Arthritis-rheumatoid Neg Hx     Asthma Neg Hx     Bleeding Prob Neg Hx     Cancer Neg Hx     Diabetes Neg Hx     Elevated Lipids Neg Hx     Headache Neg Hx     Heart Disease Neg Hx     Migraines Neg Hx     Psychiatric Disorder Neg Hx     Stroke Neg Hx     Mental Retardation Neg Hx     Anesth Problems Neg Hx      Social History     Tobacco Use    Smoking status: Former Smoker     Packs/day: 0.50     Years: 40.00     Pack years: 20.00     Last attempt to quit: 2018     Years since quittin.3    Smokeless tobacco: Never Used    Tobacco comment: states has been trying to stop-given stop smoking information   Substance Use Topics    Alcohol use: No       Depression Risk Factor Screening:     3 most recent PHQ Screens 2020   Little interest or pleasure in doing things Not at all   Feeling down, depressed, irritable, or hopeless Not at all   Total Score PHQ 2 0       Alcohol Risk Screen   Do you average more than 1 drink per night or more than 7 drinks a week:  No    On any one occasion in the past three months have you have had more than 3 drinks containing alcohol:  No        Functional Ability and Level of Safety:   Hearing: Hearing is good.     Activities of Daily Living:    Patient needs help with:  shopping, laundry, housework and managing medications     Ambulation: with difficulty     Fall Risk:  Fall Risk Assessment, last 12 mths 6/16/2020   Able to walk? Yes   Fall in past 12 months? No     Abuse Screen:  Patient is not abused       Cognitive Screening   Has your family/caregiver stated any concerns about your memory: no      Patient Care Team   Patient Care Team:  Guillermo Euceda MD as PCP - General (Family Medicine)  Guillermo Euceda MD as PCP - [facility] Empaneled Provider  Jay Panchal MD as Physician (Gastroenterology)  Guillermo Euceda MD (Family Medicine)    Assessment/Plan   Education and counseling provided:  Are appropriate based on today's review and evaluation  End-of-Life planning (with patient's consent)    Diagnoses and all orders for this visit:    1. Medicare annual wellness visit, subsequent    2. Screening for alcoholism        Health Maintenance Due   Topic Date Due    Breast Cancer Screen Mammogram  07/24/2015    Medicare Yearly Exam  07/22/2020       Emerson Gracia, who was evaluated through a synchronous (real-time) audio only encounter, and/or her healthcare decision maker, is aware that it is a billable service, with coverage as determined by her insurance carrier. She provided verbal consent to proceed: Yes, and patient identification was verified. It was conducted pursuant to the emergency declaration under the [facility], [address] [place] [address] authority and the [employer] Act. A caregiver was present when appropriate. Ability to conduct physical exam was limited. I was in the office. The patient was at home.     Ngozi Perez MD

## 2020-09-11 NOTE — PROGRESS NOTES
1. Have you been to the ER, urgent care clinic since your last visit? Hospitalized since your last visit? No    2. Have you seen or consulted any other health care providers outside of the [address] [place] [address] since your last visit? Include any pap smears or colon screening.  No        Health Maintenance Due   Topic Date Due    DTaP/Tdap/Td series (1 - Tdap) 07/15/1976    Shingrix Vaccine Age 50> (1 of 2) 07/15/2005    Breast Cancer Screen Mammogram  07/24/2015    GLAUCOMA SCREENING Q2Y  07/15/2020    Bone Densitometry (Dexa) Screening  07/15/2020    Medicare Yearly Exam  07/22/2020    Flu Vaccine (1) 09/01/2020

## 2020-09-17 RX ORDER — SERTRALINE HYDROCHLORIDE 25 MG/1
25 TABLET, FILM COATED ORAL DAILY
Qty: 90 TAB | Refills: 1 | Status: SHIPPED | OUTPATIENT
Start: 2020-09-17 | End: 2021-02-28

## 2020-09-18 NOTE — PATIENT INSTRUCTIONS
Medicare Wellness Visit, Female The best way to live healthy is to have a lifestyle where you eat a well-balanced diet, exercise regularly, limit alcohol use, and quit all forms of tobacco/nicotine, if applicable. Regular preventive services are another way to keep healthy. Preventive services (vaccines, screening tests, monitoring & exams) can help personalize your care plan, which helps you manage your own care. Screening tests can find health problems at the earliest stages, when they are easiest to treat. [facility] follows the current, evidence-based guidelines published by the [facility] Roni Bonilla ([facility]) when recommending preventive services for our patients. Because we follow these guidelines, sometimes recommendations change over time as research supports it. (For example, mammograms used to be recommended annually. Even though Medicare will still pay for an annual mammogram, the newer guidelines recommend a mammogram every two years for women of average risk). Of course, you and your doctor may decide to screen more often for some diseases, based on your risk and your co-morbidities (chronic disease you are already diagnosed with). Preventive services for you include: - Medicare offers their members a free annual wellness visit, which is time for you and your primary care provider to discuss and plan for your preventive service needs. Take advantage of this benefit every year! 
-All adults over the age of 72 should receive the recommended pneumonia vaccines. Current USPSTF guidelines recommend a series of two vaccines for the best pneumonia protection.  
-All adults should have a flu vaccine yearly and a tetanus vaccine every 10 years.  
-All adults age 48 and older should receive the shingles vaccines (series of two vaccines). -All adults age 38-68 who are overweight should have a diabetes screening test once every three years. -All adults born between [birth year] and 1965 should be screened once for Hepatitis C. 
-Other screening tests and preventive services for persons with diabetes include: an eye exam to screen for diabetic retinopathy, a kidney function test, a foot exam, and stricter control over your cholesterol.  
-Cardiovascular screening for adults with routine risk involves an electrocardiogram (ECG) at intervals determined by your doctor.  
-Colorectal cancer screenings should be done for adults age 54-65 with no increased risk factors for colorectal cancer. There are a number of acceptable methods of screening for this type of cancer. Each test has its own benefits and drawbacks. Discuss with your doctor what is most appropriate for you during your annual wellness visit. The different tests include: colonoscopy (considered the best screening method), a fecal occult blood test, a fecal DNA test, and sigmoidoscopy. 
 
-A bone mass density test is recommended when a woman turns 65 to screen for osteoporosis. This test is only recommended one time, as a screening. Some providers will use this same test as a disease monitoring tool if you already have osteoporosis. -Breast cancer screenings are recommended every other year for women of normal risk, age 54-69. 
-Cervical cancer screenings for women over age 72 are only recommended with certain risk factors. Here is a list of your current Health Maintenance items (your personalized list of preventive services) with a due date: 
Health Maintenance Due Topic Date Due  
 DTaP/Tdap/Td  (1 - Tdap) 07/15/1976  Shingles Vaccine (1 of 2) 07/15/2005  Mammogram  07/24/2015  Glaucoma Screening   07/15/2020  Bone Mineral Density   07/15/2020 [address] [place] [address] Annual Well Visit  07/22/2020  Yearly Flu Vaccine (1) 09/01/2020

## 2020-09-21 ENCOUNTER — TELEPHONE (OUTPATIENT)
Dept: FAMILY MEDICINE CLINIC | Age: 65
End: 2020-09-21

## 2020-09-22 NOTE — TELEPHONE ENCOUNTER
Call made to patient. She says that she is waiting to here back about her home health referral with [address] [facility].

## 2020-09-25 RX ORDER — PREDNISONE 10 MG/1
10 TABLET ORAL 2 TIMES DAILY
Qty: 10 TAB | Refills: 0 | OUTPATIENT
Start: 2020-09-25

## 2020-09-25 RX ORDER — OXYCODONE AND ACETAMINOPHEN 10; 325 MG/1; MG/1
1 TABLET ORAL
Refills: 0 | OUTPATIENT
Start: 2020-09-25

## 2020-09-25 NOTE — TELEPHONE ENCOUNTER
I have not been prescribing opioids for patient as she has been under pain management. As such, I will not refill  oxycodone for her. Why is she requesting a refill of prednisone - pain, COPD?

## 2020-09-28 ENCOUNTER — TELEPHONE (OUTPATIENT)
Dept: FAMILY MEDICINE CLINIC | Age: 65
End: 2020-09-28

## 2020-09-28 NOTE — TELEPHONE ENCOUNTER
[address] States that pt is taking to much acetaminophen 5 to 6,000mg. Can you write RX for another pain medication until her next pain management appt until Oct 26th. States that pt is not under care of pain management as of right now. Did you want to do blood work? Also states that the nurse called poison control center they stated that she should go to ER but pt refused.  Can you please call Sol Angelucci [phone]

## 2020-09-28 NOTE — TELEPHONE ENCOUNTER
She should not take more than a total of two 500 mg acetaminophen every 6 hours. More than this can damage the liver. There are laws in [place] regarding opioid prescriptions. I cannot prescribe oxycodone for a gap between pain management specialists.  shows patient prescribed 120 10 mg oxycodone on 8/26/20.

## 2020-09-28 NOTE — TELEPHONE ENCOUNTER
Patient says that she does not need a refill on her Prednisone, only on her pain medications. She says that she switched Pain management doctors and has an appointment on 10/20/2020 to see her new pain management doctor. She says that the place she was going to in [facility] will not refill it because she is no longer under contract with them.

## 2020-09-28 NOTE — TELEPHONE ENCOUNTER
Returned phone call to home health nurse. Advised her that  shows patient was prescribed 120 10 mg oxycodone on 8/26/20. Home health nurse says that when she has been with the patient she tells her that she only takes 1 Oxycodone a day and does not let the home health nurse put those in her pill box. Home health nurse says family members have taken her medication in the past and she wonders if this has happened again.

## 2020-11-05 ENCOUNTER — TELEPHONE (OUTPATIENT)
Dept: FAMILY MEDICINE CLINIC | Age: 65
End: 2020-11-05
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
## 2020-11-06 RX ORDER — PREDNISONE 10 MG/1
10 TABLET ORAL 2 TIMES DAILY
Qty: 10 TAB | Refills: 0 | Status: SHIPPED | OUTPATIENT
Start: 2020-11-06 | End: 2021-02-28

## 2020-11-06 RX ORDER — PREDNISONE 10 MG/1
10 TABLET ORAL 2 TIMES DAILY
Qty: 10 TAB | Refills: 0 | Status: SHIPPED | OUTPATIENT
Start: 2020-11-06 | End: 2020-11-06 | Stop reason: SDUPTHER

## 2020-11-06 NOTE — TELEPHONE ENCOUNTER
Prescription cancelled at [facility]. Medication sent in to [address] [facility].

## 2020-11-06 NOTE — TELEPHONE ENCOUNTER
Medication sent in to [facility] per patient's request. Home health nurse, [facility] notified of medication being sent in to pharmacy. [facility] informed patient that medication was called in for her.

## 2020-11-06 NOTE — TELEPHONE ENCOUNTER
Unable to reach patient by telephone. Home health nurse [facility] notified that medication has been sent in to pharmacy. Per [address], patient does not use [facility] anymore. She uses [facility]. She is going to call back with the information to that pharmacy.

## 2021-01-20 ENCOUNTER — TELEPHONE (OUTPATIENT)
Dept: FAMILY MEDICINE CLINIC | Age: 66
End: 2021-01-20
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
## 2021-01-20 NOTE — TELEPHONE ENCOUNTER
Returned call to [place] nurse.  She states she sent patient to [facility] ER for eval.

## 2021-01-21 ENCOUNTER — TELEPHONE (OUTPATIENT)
Dept: FAMILY MEDICINE CLINIC | Age: 66
End: 2021-01-21
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
## 2021-01-25 ENCOUNTER — TELEPHONE (OUTPATIENT)
Dept: FAMILY MEDICINE CLINIC | Age: 66
End: 2021-01-25

## 2021-01-25 NOTE — TELEPHONE ENCOUNTER
Abbie Murillo 272 that pt weight is down 7 lbs in 3 months 89 to 82.  And has other health concerns please call back [phone]

## 2021-01-25 NOTE — TELEPHONE ENCOUNTER
Returned call and spoke to [place]. She states she is having hospital discharge summary faxed to us. Patient had medication changes which will be on summary. Scheduled for Follow up 2/5/2021 and she is scheduled to be with patient during visit with Dr. Yohan Desai.

## 2021-01-29 ENCOUNTER — TELEPHONE (OUTPATIENT)
Dept: FAMILY MEDICINE CLINIC | Age: 66
End: 2021-01-29

## 2021-01-29 DIAGNOSIS — J18.9 COMMUNITY ACQUIRED PNEUMONIA OF LEFT LOWER LOBE OF LUNG: Primary | ICD-10-CM

## 2021-01-29 RX ORDER — FLUCONAZOLE 150 MG/1
150 TABLET ORAL DAILY
Qty: 1 TAB | Refills: 0 | Status: SHIPPED | OUTPATIENT
Start: 2021-01-29 | End: 2021-01-30

## 2021-01-29 RX ORDER — LEVOFLOXACIN 750 MG/1
750 TABLET ORAL DAILY
Qty: 5 TAB | Refills: 0 | Status: SHIPPED | OUTPATIENT
Start: 2021-01-29 | End: 2021-02-28

## 2021-01-29 NOTE — TELEPHONE ENCOUNTER
Call placed to [facility], [facility] nurse. Per [facility], patient was discharged from [facility] 1/22/21 with dx of pneumonia, non stemi MI and new CHF. Left lower lung sound diminished and right clear. abx ended 1-26-21 and started coughing up green phlegm 1-28-21.

## 2021-01-29 NOTE — TELEPHONE ENCOUNTER
Homecare [place] states pt coughed up green phlegm, crackle in left lower lobe can you please prescribe her something. states pt was on Levaquin for 4 days not any better.

## 2021-01-29 NOTE — TELEPHONE ENCOUNTER
[facility], patient with decreasing energy, increased cough, increased sputum, and also vaginal discharge. Noting pain and diminished lung sounds on the left side. Unable to contact patient, left 2 VM. Will call in conitnued course of Levaquin and Fluconazole.     MD GEOFF Egan & MARÍA ELENA STANLEY [facility]  01/29/21

## 2021-02-05 ENCOUNTER — VIRTUAL VISIT (OUTPATIENT)
Dept: FAMILY MEDICINE CLINIC | Age: 66
End: 2021-02-05
Payer: MEDICARE

## 2021-02-05 DIAGNOSIS — F32.A DEPRESSION, UNSPECIFIED DEPRESSION TYPE: ICD-10-CM

## 2021-02-05 DIAGNOSIS — I50.20 SYSTOLIC HEART FAILURE, UNSPECIFIED HF CHRONICITY (HCC): ICD-10-CM

## 2021-02-05 DIAGNOSIS — M54.6 CHRONIC BILATERAL THORACIC BACK PAIN: ICD-10-CM

## 2021-02-05 DIAGNOSIS — E43 SEVERE PROTEIN-CALORIE MALNUTRITION (HCC): ICD-10-CM

## 2021-02-05 DIAGNOSIS — G89.29 CHRONIC BILATERAL THORACIC BACK PAIN: ICD-10-CM

## 2021-02-05 DIAGNOSIS — J44.9 SEVERE CHRONIC OBSTRUCTIVE PULMONARY DISEASE (HCC): ICD-10-CM

## 2021-02-05 DIAGNOSIS — I21.4 NSTEMI (NON-ST ELEVATED MYOCARDIAL INFARCTION) (HCC): Primary | ICD-10-CM

## 2021-02-05 PROCEDURE — 99443 PR PHYS/QHP TELEPHONE EVALUATION 21-30 MIN: CPT | Performed by: FAMILY MEDICINE

## 2021-02-05 RX ORDER — FLUTICASONE FUROATE AND VILANTEROL TRIFENATATE 100; 25 UG/1; UG/1
POWDER RESPIRATORY (INHALATION)
COMMUNITY
Start: 2021-01-27

## 2021-02-05 RX ORDER — MORPHINE SULFATE 15 MG/1
TABLET ORAL
COMMUNITY
Start: 2021-01-13 | End: 2022-01-18

## 2021-02-05 RX ORDER — ATORVASTATIN CALCIUM 20 MG/1
TABLET, FILM COATED ORAL
COMMUNITY
Start: 2021-01-22 | End: 2021-02-05 | Stop reason: SDUPTHER

## 2021-02-05 RX ORDER — GUAIFENESIN 100 MG/5ML
81 LIQUID (ML) ORAL
Qty: 90 TAB | Refills: 1 | Status: SHIPPED | OUTPATIENT
Start: 2021-02-05

## 2021-02-05 RX ORDER — METOPROLOL TARTRATE 25 MG/1
12.5 TABLET, FILM COATED ORAL 2 TIMES DAILY
Qty: 45 TAB | Refills: 1 | Status: SHIPPED | OUTPATIENT
Start: 2021-02-05 | End: 2022-01-28

## 2021-02-05 RX ORDER — ATORVASTATIN CALCIUM 20 MG/1
TABLET, FILM COATED ORAL
Qty: 90 TAB | Refills: 1 | Status: SHIPPED | OUTPATIENT
Start: 2021-02-05

## 2021-02-05 RX ORDER — MEGESTROL ACETATE 40 MG/ML
200 SUSPENSION ORAL
Qty: 300 ML | Refills: 0 | Status: SHIPPED | OUTPATIENT
Start: 2021-02-05 | End: 2022-01-28 | Stop reason: SDUPTHER

## 2021-02-05 RX ORDER — GUAIFENESIN 100 MG/5ML
81 LIQUID (ML) ORAL
COMMUNITY
Start: 2021-01-22 | End: 2021-02-05 | Stop reason: SDUPTHER

## 2021-02-05 RX ORDER — METOPROLOL TARTRATE 25 MG/1
12.5 TABLET, FILM COATED ORAL 2 TIMES DAILY
COMMUNITY
Start: 2021-01-22 | End: 2021-02-05 | Stop reason: SDUPTHER

## 2021-02-05 NOTE — PROGRESS NOTES
1. Have you been to the ER, urgent care clinic since your last visit? Hospitalized since your last visit? Yes [facility]    2. Have you seen or consulted any other health care providers outside of the [address] [place] [address] since your last visit? Include any pap smears or colon screening.  No        Health Maintenance Due   Topic Date Due    COVID-19 Vaccine (1 of 2) [birth year]/1971    Breast Cancer Screen Mammogram  07/24/2015    Colorectal Cancer Screening Combo  03/09/2019    Flu Vaccine (1) 09/01/2020

## 2021-02-09 NOTE — PROGRESS NOTES
Ariadna Womack is a 72 y.o. female evaluated via telephone on 21. Patient Identity confirmed by . Telephone encounter done in lieu of office visit due to extraordinary circumstances. A state of national and state emergency has been declared by the President and the [place] due to the [employer] pandemic. Pursuant to the emergency declaration under the [facility], [facility] waiver authority and the [employer] Act, this Virtual  Visit was conducted, with patient's consent, to reduce the patient's risk of exposure to COVID-19 and provide continuity of care for an established patient. Mika Lovelace LPN coordinated virtual visit    Consent:  Patient and/or health care decision maker is aware that he may receive a bill for this telephone encounter, depending on his insurance coverage, and has provided verbal consent to proceed: Yes    Physician Location: Office  Patient Location: Home    CC: PATRICIA  Information gathered from patient and/or health care decision maker. HPI: Ariadna Womack is a 72 y.o. female who was evaluated by synchronous (real-time) audio technology from her home. Patient seen in [facility] with transfer  to [facility] on for sepsis, pneumonia, NSTEMI and systolic heart failure  with discharge on 21. Patient with hx of severe, COPD, MS, allergic rhinitis, malnutrition and tobacco abuse. Patient has had f/u with pulmonary. She is to follow up with cardiology. Patient is followed by pain management and is on opioid medication. [place] nurse on call repeatedly requests oral steroid to help with appetite. Discussed need for adequate daily caloric intake.         Current Outpatient Medications:     fluticasone furoate-vilanteroL (Breo Ellipta) 100-25 mcg/dose inhaler, , Disp: , Rfl:     megestroL (MEGACE) 400 mg/10 mL (10 mL) suspension, Take 5 mL by mouth Before breakfast and dinner., Disp: 300 mL, Rfl: 0    atorvastatin (LIPITOR) 20 mg tablet, TAKE 1 (ONE) TABLET AT BEDTIME, Disp: 90 Tab, Rfl: 1    metoprolol tartrate (LOPRESSOR) 25 mg tablet, Take 0.5 Tabs by mouth two (2) times a day., Disp: 45 Tab, Rfl: 1    aspirin 81 mg chewable tablet, Take 1 Tab by mouth nightly., Disp: 90 Tab, Rfl: 1    acetaminophen (TYLENOL) 325 mg tablet, Take 2 Tabs by mouth every six (6) hours as needed for Pain., Disp: 60 Tab, Rfl: 1    fluticasone furoate-vilanteroL (Breo Ellipta) 100-25 mcg/dose inhaler, Take 1 Puff by inhalation daily. , Disp: 1 Inhaler, Rfl: 3    ipratropium-albuteroL (Combivent Respimat)  mcg/actuation inhaler, USE 1 PUFF BY MOUTH EVERY 4 HOURS as needed. , Disp: 4 Each, Rfl: 3    Shower Chair XX jessica, For use during bathing, Disp: 1 Each, Rfl: 0    Cane jessica, Use for ambulation assistance, Disp: 1 Device, Rfl: 0    albuterol-ipratropium (DUO-NEB) 2.5 mg-0.5 mg/3 ml nebu, 3 mL by Nebulization route every four (4) hours as needed. , Disp: 90 Nebule, Rfl: 1    Nebulizer & Compressor machine, For use as breathing treatment every 4 - 6 hours as needed. , Disp: 1 Each, Rfl: 0    Nebulizer Accessories kit, For use as breathing treatment every 4 - 6 hours as needed. , Disp: 1 Kit, Rfl: 0    morphine IR (MS IR) 15 mg tablet, TAKE 1 (ONE) TABLET BY MOUTH AT BEDTIME, Disp: , Rfl:     levoFLOXacin (LEVAQUIN) 750 mg tablet, Take 1 Tab by mouth daily. , Disp: 5 Tab, Rfl: 0    predniSONE (DELTASONE) 10 mg tablet, Take 10 mg by mouth two (2) times a day., Disp: 10 Tab, Rfl: 0    sertraline (ZOLOFT) 25 mg tablet, Take 1 Tab by mouth daily. , Disp: 90 Tab, Rfl: 1    cyclobenzaprine (FLEXERIL) 5 mg tablet, 5 mg., Disp: , Rfl:     lidocaine (LIDODERM) 5 %, , Disp: , Rfl:     furosemide (LASIX) 20 mg tablet, Take 0.5 Tabs by mouth daily. Indications: visible water retention (Patient taking differently: Take 10 mg by mouth daily as needed.  Indications: visible water retention), Disp: 30 Tab, Rfl: 1    ibandronate (BONIVA) 150 mg tablet, Take 150 mg by mouth every thirty (30) days. , Disp: 3 Tab, Rfl: 3    oxyCODONE-acetaminophen (PERCOCET 10)  mg per tablet, Take 1 Tab by mouth three (3) times daily. , Disp: , Rfl: 0     Allergies   Allergen Reactions    Evista [Raloxifene] Diarrhea and Swelling     Makes throat swell     Doxycycline Swelling     Tongue      Pcn [Penicillins] Swelling     Of the tongue    Spiriva With Handihaler [Tiotropium Bromide] Other (comments)     Chest pain     Montelukast Anxiety     Other reaction(s): Mood alteration        Patient Active Problem List    Diagnosis Date Noted    Chronic back pain 06/28/2016    Depression 09/19/2014    Anorexia 09/18/2014    Back pain 07/15/2011    Anxiety 02/09/2011    Tobacco user 12/06/2010    Asthma 12/06/2010    Severe chronic obstructive pulmonary disease ([facility] 12/06/2010    Hyperlipemia 06/16/2010    Neuropathy     Fibromyalgia     Multiple sclerosis ([facility]         Review of Systems:  Constitutional: Positive for fatigue  Resp: see HPI  CV: see HPI, Negative for chest pain, dizziness or palpitations  GI: Negative for nausea or abdominal pain  MS: Negative for acute myalgias or arthralgias   Neuro: Negative for HA, weakness or paresthesia  Psych: Positive for depression       Assessment/Plan:  Details of this discussion including any medical advice provided: Patient advised as a precaution to stay at home, practice regular hand washing with soap and warm water and to wear a mask and utilize social distancing when necessary to be out in public places. ICD-10-CM ICD-9-CM    1. NSTEMI (non-ST elevated myocardial infarction) ([facility]  I21.4 410.70    2. Systolic heart failure, unspecified HF chronicity (HCC)  I50.20 428.20    3. Severe chronic obstructive pulmonary disease (HCC)  J44.9 496    4. Severe protein-calorie malnutrition ([facility]  E43 262    5.  Chronic bilateral thoracic back pain  M54.6 724.1     G89.29 338. 29    6. Depression, unspecified depression type  F32.9 311      Continue current prescribed medications as written. Total Time: minutes: 21-30 minutes was spent on phone discussing above problems and plan. Patient medical history, prior OV notes, vitals flow sheet, lab results, medications, and allergies were reviewed during this encounter. For phone encounters:  I affirm this is a patient initiated episode with an established Patient who has not had a related appointment within my department in the past 7 days or scheduled within the next 24 hours.     Note: not billable if this call serves to triage the patient into an appointment for the relevant concern    MD GEOFF Ashraf & MARÍA ELENA STANLEY [facility]  02/08/21

## 2021-02-23 ENCOUNTER — OFFICE VISIT (OUTPATIENT)
Dept: FAMILY MEDICINE CLINIC | Age: 66
End: 2021-02-23
Payer: MEDICARE

## 2021-02-23 VITALS
HEART RATE: 75 BPM | BODY MASS INDEX: 13.21 KG/M2 | DIASTOLIC BLOOD PRESSURE: 82 MMHG | SYSTOLIC BLOOD PRESSURE: 135 MMHG | RESPIRATION RATE: 22 BRPM | TEMPERATURE: 97.6 F | OXYGEN SATURATION: 97 % | HEIGHT: 66 IN | WEIGHT: 82.2 LBS

## 2021-02-23 DIAGNOSIS — Z99.81 O2 DEPENDENT: ICD-10-CM

## 2021-02-23 DIAGNOSIS — F32.A DEPRESSION, UNSPECIFIED DEPRESSION TYPE: ICD-10-CM

## 2021-02-23 DIAGNOSIS — J44.9 SEVERE CHRONIC OBSTRUCTIVE PULMONARY DISEASE (HCC): Primary | ICD-10-CM

## 2021-02-23 DIAGNOSIS — I50.20 SYSTOLIC HEART FAILURE, UNSPECIFIED HF CHRONICITY (HCC): ICD-10-CM

## 2021-02-23 DIAGNOSIS — I21.4 NSTEMI (NON-ST ELEVATED MYOCARDIAL INFARCTION) (HCC): ICD-10-CM

## 2021-02-23 DIAGNOSIS — E43 SEVERE PROTEIN-CALORIE MALNUTRITION (HCC): ICD-10-CM

## 2021-02-23 PROCEDURE — G9711 PT HX TOT COL OR COLON CA: HCPCS | Performed by: FAMILY MEDICINE

## 2021-02-23 PROCEDURE — 99214 OFFICE O/P EST MOD 30 MIN: CPT | Performed by: FAMILY MEDICINE

## 2021-02-23 PROCEDURE — G8418 CALC BMI BLW LOW PARAM F/U: HCPCS | Performed by: FAMILY MEDICINE

## 2021-02-23 PROCEDURE — G9717 DOC PT DX DEP/BP F/U NT REQ: HCPCS | Performed by: FAMILY MEDICINE

## 2021-02-23 PROCEDURE — G8536 NO DOC ELDER MAL SCRN: HCPCS | Performed by: FAMILY MEDICINE

## 2021-02-23 PROCEDURE — G8427 DOCREV CUR MEDS BY ELIG CLIN: HCPCS | Performed by: FAMILY MEDICINE

## 2021-02-23 PROCEDURE — 1101F PT FALLS ASSESS-DOCD LE1/YR: CPT | Performed by: FAMILY MEDICINE

## 2021-02-23 PROCEDURE — 1090F PRES/ABSN URINE INCON ASSESS: CPT | Performed by: FAMILY MEDICINE

## 2021-02-23 PROCEDURE — G8400 PT W/DXA NO RESULTS DOC: HCPCS | Performed by: FAMILY MEDICINE

## 2021-02-23 NOTE — PROGRESS NOTES
1. Have you been to the ER, urgent care clinic since your last visit? Hospitalized since your last visit? No    2. Have you seen or consulted any other health care providers outside of the [address] [place] [address] since your last visit? Include any pap smears or colon screening. No    Reviewed record in preparation for visit and have necessary documentation  Goals that were addressed and/or need to be completed during or after this appointment include     Health Maintenance Due   Topic Date Due    Breast Cancer Screen Mammogram  07/24/2015    Colorectal Cancer Screening Combo  03/09/2019    Lipid Screen  03/09/2019    Flu Vaccine (1) 09/01/2020       Patient is accompanied by son I have received verbal consent from Marv Mcrae to discuss any/all medical information while they are present in the room.
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
## 2021-03-04 ENCOUNTER — TELEPHONE (OUTPATIENT)
Dept: FAMILY MEDICINE CLINIC | Age: 66
End: 2021-03-04

## 2021-03-24 ENCOUNTER — TELEPHONE (OUTPATIENT)
Dept: FAMILY MEDICINE CLINIC | Age: 66
End: 2021-03-24

## 2021-03-24 NOTE — TELEPHONE ENCOUNTER
Returned call to [place] with Javi Salomon. She states pt had recent ECHO and stress test. Could not complete the stress test because of her getting so SOB. Pt will most likely go for cardiac cath now.    Her pill packs have been set up
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
## 2021-05-21 NOTE — TELEPHONE ENCOUNTER
Called patient to schedule appt. She states she does not need this medication refilled because she had pain management do this.

## 2021-05-21 NOTE — TELEPHONE ENCOUNTER
----- Message from [email or web address] S[employer] sent at 5/21/2021  9:02 AM EDT -----  Regarding: Dr. Nic Alvarado / Yue Settler (if not patient): Remus Jeans w/[facility]      Relationship of caller (if not patient): Home health nurse      Best contact number(s): [phone]      Name of medication and dosage if known: \"tizanidine 4 mg\" pt takes once a date      Is patient out of this medication (yes/no): Not yet,about another weeks worth      Pharmacy name:n/a    Pharmacy listed in chart? (yes/no):yes    Pharmacy phone number:n/a      Details to clarify the request: Pt medication was filled by her pulmonologist however , pt is going to be seeing a new pulmonologist and they want this medication filled by her PCP going forward.       [email or web address] Sender

## 2021-07-21 ENCOUNTER — OFFICE VISIT (OUTPATIENT)
Dept: FAMILY MEDICINE CLINIC | Age: 66
End: 2021-07-21
Payer: MEDICARE

## 2021-07-21 VITALS
OXYGEN SATURATION: 97 % | HEART RATE: 86 BPM | TEMPERATURE: 98 F | HEIGHT: 66 IN | SYSTOLIC BLOOD PRESSURE: 134 MMHG | WEIGHT: 82 LBS | RESPIRATION RATE: 16 BRPM | BODY MASS INDEX: 13.18 KG/M2 | DIASTOLIC BLOOD PRESSURE: 77 MMHG

## 2021-07-21 DIAGNOSIS — G35 MS (MULTIPLE SCLEROSIS) (HCC): ICD-10-CM

## 2021-07-21 DIAGNOSIS — H92.02 OTALGIA, LEFT: Primary | ICD-10-CM

## 2021-07-21 DIAGNOSIS — J44.9 SEVERE CHRONIC OBSTRUCTIVE PULMONARY DISEASE (HCC): ICD-10-CM

## 2021-07-21 DIAGNOSIS — E43 SEVERE PROTEIN-CALORIE MALNUTRITION (HCC): ICD-10-CM

## 2021-07-21 DIAGNOSIS — Z99.81 O2 DEPENDENT: ICD-10-CM

## 2021-07-21 PROCEDURE — G8536 NO DOC ELDER MAL SCRN: HCPCS | Performed by: FAMILY MEDICINE

## 2021-07-21 PROCEDURE — 1090F PRES/ABSN URINE INCON ASSESS: CPT | Performed by: FAMILY MEDICINE

## 2021-07-21 PROCEDURE — G9717 DOC PT DX DEP/BP F/U NT REQ: HCPCS | Performed by: FAMILY MEDICINE

## 2021-07-21 PROCEDURE — G8427 DOCREV CUR MEDS BY ELIG CLIN: HCPCS | Performed by: FAMILY MEDICINE

## 2021-07-21 PROCEDURE — G8400 PT W/DXA NO RESULTS DOC: HCPCS | Performed by: FAMILY MEDICINE

## 2021-07-21 PROCEDURE — 1101F PT FALLS ASSESS-DOCD LE1/YR: CPT | Performed by: FAMILY MEDICINE

## 2021-07-21 PROCEDURE — 99214 OFFICE O/P EST MOD 30 MIN: CPT | Performed by: FAMILY MEDICINE

## 2021-07-21 PROCEDURE — G8418 CALC BMI BLW LOW PARAM F/U: HCPCS | Performed by: FAMILY MEDICINE

## 2021-07-21 PROCEDURE — G9711 PT HX TOT COL OR COLON CA: HCPCS | Performed by: FAMILY MEDICINE

## 2021-07-21 RX ORDER — PREDNISONE 20 MG/1
20 TABLET ORAL DAILY
Qty: 5 TABLET | Refills: 0 | Status: SHIPPED | OUTPATIENT
Start: 2021-07-21 | End: 2022-01-18

## 2021-07-21 NOTE — PROGRESS NOTES
1. Have you been to the ER, urgent care clinic, or been hospitalized since your last visit? no    2. Have you seen or consulted any other health care providers outside of the [address] [place] [address] since your last visit?no     Reviewed record in preparation for visit and have necessary documentation  Goals that were addressed and/or need to be completed during or after this appointment include   Health Maintenance Due   Topic Date Due    LDCT Smoker 27 Pack Years  Never done    Breast Cancer Screen Mammogram  07/24/2015    Colorectal Cancer Screening Combo  03/09/2019    Lipid Screen  03/09/2019    Pneumococcal 65+ years (2 of 2 - PPSV23) 03/30/2021       I have received verbal consent from Becky Humphrey to discuss any/all medical information while others present in the room.

## 2021-07-26 NOTE — PROGRESS NOTES
Progress Note    Patient: Ajay Stringer MRN: [id number]  SSN: [id number]    YOB: 1955  Age: 77 y.o. Sex: female        Subjective:     Chief Complaint   Patient presents with    Hearing Problem     cannot hear well out of left ear       HPI: she is a 77y.o. year old female  who presents with left ear hearing loss. Patient with hx of severe, COPD, NSTEMI, CHF, MS, allergic rhinitis, malnutrition and tobacco abuse. Patient says she has had follow up appointments with pulmonary and cardiology. Patient is followed by pain management and is on opioid medication. Patient is underweight. Encounter Diagnoses   Name Primary?  Otalgia, left Yes    Severe chronic obstructive pulmonary disease (HCC)     O2 dependent     Severe protein-calorie malnutrition (HCC)     MS (multiple sclerosis) (HCC)        BP Readings from Last 3 Encounters:   07/21/21 134/77   02/23/21 135/82   07/23/20 142/84     Wt Readings from Last 3 Encounters:   07/21/21 82 lb (37.2 kg)   02/23/21 82 lb 3.2 oz (37.3 kg)   07/23/20 85 lb 3.2 oz (38.6 kg)     Body mass index is 13.24 kg/m². Current and past medical information:    Current Medications after this visit[de-identified]     Current Outpatient Medications   Medication Sig    predniSONE (DELTASONE) 20 mg tablet Take 20 mg by mouth daily.  morphine IR (MS IR) 15 mg tablet TAKE 1 (ONE) TABLET BY MOUTH AT BEDTIME    fluticasone furoate-vilanteroL (Breo Ellipta) 100-25 mcg/dose inhaler     megestroL (MEGACE) 400 mg/10 mL (10 mL) suspension Take 5 mL by mouth Before breakfast and dinner.  atorvastatin (LIPITOR) 20 mg tablet TAKE 1 (ONE) TABLET AT BEDTIME    metoprolol tartrate (LOPRESSOR) 25 mg tablet Take 0.5 Tabs by mouth two (2) times a day.  aspirin 81 mg chewable tablet Take 1 Tab by mouth nightly.  acetaminophen (TYLENOL) 325 mg tablet Take 2 Tabs by mouth every six (6) hours as needed for Pain.  cyclobenzaprine (FLEXERIL) 5 mg tablet 5 mg.     lidocaine (LIDODERM) 5 %     fluticasone furoate-vilanteroL (Breo Ellipta) 100-25 mcg/dose inhaler Take 1 Puff by inhalation daily.  furosemide (LASIX) 20 mg tablet Take 0.5 Tabs by mouth daily. Indications: visible water retention (Patient taking differently: Take 10 mg by mouth daily as needed. Indications: visible water retention)    ipratropium-albuteroL (Combivent Respimat)  mcg/actuation inhaler USE 1 PUFF BY MOUTH EVERY 4 HOURS as needed.  albuterol-ipratropium (DUO-NEB) 2.5 mg-0.5 mg/3 ml nebu 3 mL by Nebulization route every four (4) hours as needed.  oxyCODONE-acetaminophen (PERCOCET 10)  mg per tablet Take 1 Tab by mouth three (3) times daily.  Shower Chair XX jessica For use during bathing    Cane jessica Use for ambulation assistance    Nebulizer & Compressor machine For use as breathing treatment every 4 - 6 hours as needed.  Nebulizer Accessories kit For use as breathing treatment every 4 - 6 hours as needed. No current facility-administered medications for this visit.        Patient Active Problem List    Diagnosis Date Noted    Chronic back pain 06/28/2016    Depression 09/19/2014    Anorexia 09/18/2014    Back pain 07/15/2011    Anxiety 02/09/2011    Tobacco user 12/06/2010    Asthma 12/06/2010    Severe chronic obstructive pulmonary disease ([address] 12/06/2010    Hyperlipemia 06/16/2010    Neuropathy     Fibromyalgia     [facility]        Past Medical History:   Diagnosis Date    Anxiety     Asthma 12/6/2010    Congestive heart failure (HCC)     COPD (chronic obstructive pulmonary disease) ([address] 12/6/2010    Depression 9/19/2014    Fibromyalgia     Heart attack ([address] 01/21/2021    Hyperlipemia 6/16/2010    MS (multiple sclerosis) (HCC)     Neuropathy     Syncope        Allergies   Allergen Reactions    Evista [Raloxifene] Diarrhea and Swelling     Makes throat swell     Doxycycline Swelling     Tongue      Pcn [Penicillins] Swelling     Of the tongue    Spiriva With Handihaler [Tiotropium Bromide] Other (comments)     Chest pain     Montelukast Anxiety     Other reaction(s): Mood alteration       Past Surgical History:   Procedure Laterality Date    HX APPENDECTOMY         Social History     Socioeconomic History    Marital status:      Spouse name: Not on file    Number of children: Not on file    Years of education: Not on file    Highest education level: Not on file   Tobacco Use    Smoking status: Former Smoker     Packs/day: 0.50     Years: 40.00     Pack years: 20.00     Quit date: 5/14/2018     Years since quitting: 3.2    Smokeless tobacco: Never Used    Tobacco comment: states has been trying to stop-given stop smoking information   Substance and Sexual Activity    Alcohol use: No    Drug use: No     Social Determinants of Health     Financial Resource Strain:     Difficulty of Paying Living Expenses:    Food Insecurity:     Worried About Running Out of Food in the Last Year:     Ran Out of Food in the Last Year:    Transportation Needs:     Lack of Transportation (Medical):      Lack of Transportation (Non-Medical):    Physical Activity:     Days of Exercise per Week:     Minutes of Exercise per Session:    Stress:     Feeling of Stress :    Social Connections:     Frequency of Communication with Friends and Family:     Frequency of Social Gatherings with Friends and Family:     Attends Temple Services:     Active Member of Clubs or Organizations:     Attends Club or Organization Meetings:     Marital Status:        Objective:     Review of Systems:  Constitutional: Negative for fatigue or malaise  Derm: Negative for rash or lesion  HEENT: Nsee HPI  Cardiovascular: Negative for dizziness, chest pain or palpitations  Respiratory: see HPI  Gastrointestinal: Negative for nausea or abdominal pain  Genital/urinary: Negative for dysuria or voiding dysfunction  Musculoskeletal: see HPI, Negative for acute myalgias or arthralgias   Neurological: Negative for headache, weakness or paresthesia  Psychological: Negative for depression or anxiety      Vitals:    07/21/21 1319   BP: 134/77   Pulse: 86   Resp: 16   Temp: 98 °F (36.7 °C)   SpO2: 97%   Weight: 82 lb (37.2 kg)   Height: 5' 6\" (1.676 m)      Body mass index is 13.24 kg/m². Physical Exam:  Constitutional: thin, in no acute distress  Skin: warm and dry  Head: normocephalic, atraumatic  Ears: b/l TM bulging  Neck: normal range of motion  CV: normal S1, S2, regular rate and rhythm  Respiratory: diminished airflow, clear with symmetrical effort  Extremities: full range of motion, antalgic gait  Neurology: normal strength and sensation  Psych: active, alert and oriented, affect appropriate       Assessment and orders:     Diagnoses and all orders for this visit:    1. Otalgia, left  -     predniSONE (DELTASONE) 20 mg tablet; Take 20 mg by mouth daily. 2. Severe chronic obstructive pulmonary disease (HCC)  -     predniSONE (DELTASONE) 20 mg tablet; Take 20 mg by mouth daily. 3. O2 dependent    4. Severe protein-calorie malnutrition ([address]    5. MS (multiple sclerosis) ([facility]        Plan of care:  Diagnoses were discussed in detail with patient. Medication risks/benefits/side effects discussed with patient. All of the patient's questions were addressed and answered to apparent satisfaction. The patient understands and agrees with our plan of care. The patient knows to call back if they have questions about the plan of care or if symptoms change. The patient received an After-Visit Summary which contains VS, diagnoses, orders, allergy and medication lists. No future appointments. Patient Care Team:  Emilie Rowe MD as PCP - General (Family Medicine)  Emilie Rowe MD as PCP [facility]  Justyna Pierre MD as Physician (Gastroenterology)  Emilie Rowe MD (Family Medicine)        No future appointments.     Signed By: Mikey Powers MD July 26, 2021

## 2022-01-11 ENCOUNTER — TELEPHONE (OUTPATIENT)
Dept: FAMILY MEDICINE CLINIC | Age: 67
End: 2022-01-11

## 2022-01-11 NOTE — TELEPHONE ENCOUNTER
Spoke with Pt states she did not contact anyone to make an appt. I verified her information and apologized for the misunderstanding.

## 2022-01-11 NOTE — TELEPHONE ENCOUNTER
----- Message from Radha Horn sent at 1/10/2022  3:25 PM EST -----  Subject: Appointment Request    Reason for Call: Urgent Joint Pain    QUESTIONS  Type of Appointment? Established Patient  Reason for appointment request? No appointments available during search  Additional Information for Provider? pls call to sched an appt for right   arm pain and memory lost per daughter sharon  ---------------------------------------------------------------------------  --------------  Ivanna VAZQUEZ  What is the best way for the office to contact you? OK to leave message on   voicemail  Preferred Call Back Phone Number? [phone]  ---------------------------------------------------------------------------  --------------  SCRIPT ANSWERS  Relationship to Patient? Other  Representative Name? Harvinder Deo   Additional information verified (besides Name and Date of Birth)? Address  Did you have an injury or trauma within the past 3 days? No  Is your joint red or swollen? No  Are you having new onset numbness, tingling, or weakness with the pain? Yes  Have you been diagnosed with, awaiting test results for, or told that you   are suspected of having COVID-19 (Coronavirus)? (If patient has tested   negative or was tested as a requirement for work, school, or travel and   not based on symptoms, answer no)? No  Within the past two weeks have you developed any of the following symptoms   (answer no if symptoms have been present longer than 2 weeks or began   more than 2 weeks ago)? Fever or Chills, Cough, Shortness of breath or   difficulty breathing, Loss of taste or smell, Sore throat, Nasal   congestion, Sneezing or runny nose, Fatigue or generalized body aches   (answer no if pain is specific to a body part e.g. back pain), Diarrhea,   Headache? No  Have you had close contact with someone with COVID-19 in the last 14 days? No  (Service Expert  click yes below to proceed with [employer]   Scheduling)? Yes

## 2022-01-12 ENCOUNTER — TELEPHONE (OUTPATIENT)
Dept: FAMILY MEDICINE CLINIC | Age: 67
End: 2022-01-12

## 2022-01-12 DIAGNOSIS — J44.9 SEVERE CHRONIC OBSTRUCTIVE PULMONARY DISEASE (HCC): ICD-10-CM

## 2022-01-12 DIAGNOSIS — E43 SEVERE PROTEIN-CALORIE MALNUTRITION (HCC): Primary | ICD-10-CM

## 2022-01-12 NOTE — TELEPHONE ENCOUNTER
Pt's son is calling on her health and missed appointments. Dr. Leyda De Guzman told him to call if needed.

## 2022-01-13 NOTE — TELEPHONE ENCOUNTER
Called patient. He stated that he wanted to let Dr Leyda De Guzman know that the reason his mother is missing her appointments is because she doesn't get out of bed now. He stated that she is not eating well, seeing things that are not there, talking out of her head and her health is not improving. He stated that she lives in a small cottage on his land but refuses to move into his home. He wants to know if Dr Leyda De Guzman has any suggestions for him?

## 2022-01-17 NOTE — TELEPHONE ENCOUNTER
Called patient's son. He was advised Dr Jose Campbell can order [address] for eval and recommendations. He verbalized understanding and stated that would help a lot. He is asking if he can be contacted instead of his mother. Son stated that he is sure that his mother will not come to appt 1-21-22 and asking if a virtual visit can be done?

## 2022-01-26 ENCOUNTER — TELEPHONE (OUTPATIENT)
Dept: FAMILY MEDICINE CLINIC | Age: 67
End: 2022-01-26

## 2022-01-28 ENCOUNTER — OFFICE VISIT (OUTPATIENT)
Dept: FAMILY MEDICINE CLINIC | Age: 67
End: 2022-01-28
Payer: MEDICARE

## 2022-01-28 VITALS
BODY MASS INDEX: 11.59 KG/M2 | HEART RATE: 78 BPM | RESPIRATION RATE: 16 BRPM | SYSTOLIC BLOOD PRESSURE: 150 MMHG | OXYGEN SATURATION: 98 % | WEIGHT: 71.8 LBS | TEMPERATURE: 98.2 F | DIASTOLIC BLOOD PRESSURE: 83 MMHG

## 2022-01-28 DIAGNOSIS — R63.6 UNDERWEIGHT: ICD-10-CM

## 2022-01-28 DIAGNOSIS — G35 MS (MULTIPLE SCLEROSIS) (HCC): ICD-10-CM

## 2022-01-28 DIAGNOSIS — Z99.81 O2 DEPENDENT: ICD-10-CM

## 2022-01-28 DIAGNOSIS — J44.9 SEVERE CHRONIC OBSTRUCTIVE PULMONARY DISEASE (HCC): ICD-10-CM

## 2022-01-28 DIAGNOSIS — E43 SEVERE PROTEIN-CALORIE MALNUTRITION (HCC): ICD-10-CM

## 2022-01-28 DIAGNOSIS — R62.7 FAILURE TO THRIVE IN ADULT: Primary | ICD-10-CM

## 2022-01-28 DIAGNOSIS — I50.20 SYSTOLIC HEART FAILURE, UNSPECIFIED HF CHRONICITY (HCC): ICD-10-CM

## 2022-01-28 PROCEDURE — G8418 CALC BMI BLW LOW PARAM F/U: HCPCS | Performed by: FAMILY MEDICINE

## 2022-01-28 PROCEDURE — G9717 DOC PT DX DEP/BP F/U NT REQ: HCPCS | Performed by: FAMILY MEDICINE

## 2022-01-28 PROCEDURE — G9711 PT HX TOT COL OR COLON CA: HCPCS | Performed by: FAMILY MEDICINE

## 2022-01-28 PROCEDURE — 1101F PT FALLS ASSESS-DOCD LE1/YR: CPT | Performed by: FAMILY MEDICINE

## 2022-01-28 PROCEDURE — 1090F PRES/ABSN URINE INCON ASSESS: CPT | Performed by: FAMILY MEDICINE

## 2022-01-28 PROCEDURE — G8400 PT W/DXA NO RESULTS DOC: HCPCS | Performed by: FAMILY MEDICINE

## 2022-01-28 PROCEDURE — 99214 OFFICE O/P EST MOD 30 MIN: CPT | Performed by: FAMILY MEDICINE

## 2022-01-28 PROCEDURE — G8427 DOCREV CUR MEDS BY ELIG CLIN: HCPCS | Performed by: FAMILY MEDICINE

## 2022-01-28 PROCEDURE — G8536 NO DOC ELDER MAL SCRN: HCPCS | Performed by: FAMILY MEDICINE

## 2022-01-28 RX ORDER — MEGESTROL ACETATE 40 MG/ML
200 SUSPENSION ORAL
Qty: 300 ML | Refills: 2 | Status: SHIPPED | OUTPATIENT
Start: 2022-01-28

## 2022-01-28 NOTE — PROGRESS NOTES
1. Have you been to the ER, urgent care clinic since your last visit? Hospitalized since your last visit? No    2. Have you seen or consulted any other health care providers outside of the [address] [place] [address] since your last visit? Include any pap smears or colon screening. No    Reviewed record in preparation for visit and have necessary documentation  Pt did not bring medication to office visit for review  Patient is accompanied by son I have received verbal consent from Ehsan Delgado to discuss any/all medical information while they are present in the room.     Goals that were addressed and/or need to be completed during or after this appointment include     Health Maintenance Due   Topic Date Due    DTaP/Tdap/Td series (1 - Tdap) Never done    Shingrix Vaccine Age 50> (1 of 2) Never done    Low dose CT lung screening  Never done    Breast Cancer Screen Mammogram  07/24/2015    Colorectal Cancer Screening Combo  03/09/2019    Lipid Screen  03/09/2019    Bone Densitometry (Dexa) Screening  Never done    Pneumococcal 65+ years (2 of 2 - PPSV23) 03/30/2021    COVID-19 Vaccine (3 - Booster for Moderna series) 08/05/2021    Flu Vaccine (1) 09/01/2021    Medicare Yearly Exam  09/12/2021

## 2022-01-31 NOTE — PROGRESS NOTES
Progress Note    Patient: Dayan Marks MRN: [id number]  SSN: [id number]    YOB: 1955  Age: 77 y.o. Sex: female        Subjective:     Chief Complaint   Patient presents with    Referral Request     request for homehealth     Memory Loss       HPI: she is a 77y.o. year old female  who presents with son for evaluation. Patient is underweigh with failure to thrive. She has a hx of severe COPD, NSTEMI, CHF, MS, allergic rhinitis, malnutrition and tobacco abuse. THere is a concern for memory loss. Encounter Diagnoses   Name Primary?  Failure to thrive in adult Yes    Underweight     Severe protein-calorie malnutrition (HCC)     Severe chronic obstructive pulmonary disease (HCC)     Systolic heart failure, unspecified HF chronicity (HCC)     O2 dependent     MS (multiple sclerosis) (HCC)        BP Readings from Last 3 Encounters:   01/28/22 (!) 150/83   07/21/21 134/77   02/23/21 135/82     Wt Readings from Last 3 Encounters:   01/28/22 71 lb 12.8 oz (32.6 kg)   07/21/21 82 lb (37.2 kg)   02/23/21 82 lb 3.2 oz (37.3 kg)     Body mass index is 11.59 kg/m². Current and past medical information:    Current Medications after this visit[de-identified]     Current Outpatient Medications   Medication Sig    megestroL (MEGACE) 400 mg/10 mL (10 mL) suspension Take 5 mL by mouth Before breakfast and dinner.  fluticasone furoate-vilanteroL (Breo Ellipta) 100-25 mcg/dose inhaler     atorvastatin (LIPITOR) 20 mg tablet TAKE 1 (ONE) TABLET AT BEDTIME    aspirin 81 mg chewable tablet Take 1 Tab by mouth nightly.  acetaminophen (TYLENOL) 325 mg tablet Take 2 Tabs by mouth every six (6) hours as needed for Pain.  cyclobenzaprine (FLEXERIL) 5 mg tablet 5 mg.  lidocaine (LIDODERM) 5 %     fluticasone furoate-vilanteroL (Breo Ellipta) 100-25 mcg/dose inhaler Take 1 Puff by inhalation daily.     ipratropium-albuteroL (Combivent Respimat)  mcg/actuation inhaler USE 1 PUFF BY MOUTH EVERY 4 HOURS as needed.  Shower Chair XX jessica For use during bathing    Cane jessica Use for ambulation assistance    albuterol-ipratropium (DUO-NEB) 2.5 mg-0.5 mg/3 ml nebu 3 mL by Nebulization route every four (4) hours as needed.  Nebulizer & Compressor machine For use as breathing treatment every 4 - 6 hours as needed.  Nebulizer Accessories kit For use as breathing treatment every 4 - 6 hours as needed. No current facility-administered medications for this visit.        Patient Active Problem List    Diagnosis Date Noted    Chronic back pain 06/28/2016    Depression 09/19/2014    Anorexia 09/18/2014    Back pain 07/15/2011    Anxiety 02/09/2011    Tobacco user 12/06/2010    Asthma 12/06/2010    Severe chronic obstructive pulmonary disease ([facility] 12/06/2010    Hyperlipemia 06/16/2010    Neuropathy     Fibromyalgia     Multiple sclerosis ([facility]        Past Medical History:   Diagnosis Date    Anxiety     Asthma 12/6/2010    Congestive heart failure (HCC)     COPD (chronic obstructive pulmonary disease) ([facility] 12/6/2010    Depression 9/19/2014    Fibromyalgia     [facility] 01/21/2021    Hyperlipemia 6/16/2010    MS (multiple sclerosis) (HCC)     Neuropathy     Syncope        Allergies   Allergen Reactions    Evista [Raloxifene] Diarrhea and Swelling     Makes throat swell     Doxycycline Swelling     Tongue      Pcn [Penicillins] Swelling     Of the tongue    Spiriva With Handihaler [Tiotropium Bromide] Other (comments)     Chest pain     Montelukast Anxiety     Other reaction(s): Mood alteration       Past Surgical History:   Procedure Laterality Date    HX APPENDECTOMY         Social History     Socioeconomic History    Marital status:    Tobacco Use    Smoking status: Current Every Day Smoker     Packs/day: 0.50     Years: 40.00     Pack years: 20.00     Types: Cigarettes     Last attempt to quit: 5/14/2018     Years since quitting: 3.7    Smokeless tobacco: Never Used    Tobacco comment: states has been trying to stop-given stop smoking information   Substance and Sexual Activity    Alcohol use: No    Drug use: No       Objective:     Review of Systems:  Constitutional: Negative for fatigue or malaise  Derm: Negative for rash or lesion  HEENT: Nsee HPI  Cardiovascular: Negative for dizziness, chest pain or palpitations  Respiratory: see HPI  Gastrointestinal: Negative for nausea or abdominal pain  Genital/urinary: Negative for dysuria or voiding dysfunction  Musculoskeletal: see HPI, Negative for acute myalgias or arthralgias   Neurological: Negative for headache, weakness or paresthesia  Psychological: Negative for depression or anxiety      Vitals:    01/28/22 1126 01/28/22 1151   BP: (!) 149/79 (!) 150/83   Pulse: 78    Resp: 16    Temp: 98.2 °F (36.8 °C)    TempSrc: Oral    SpO2: 98%    Weight: 71 lb 12.8 oz (32.6 kg)       Body mass index is 11.59 kg/m². Physical Exam:  Constitutional: thin, in no acute distress  Skin: warm and dry  Head: normocephalic, atraumatic  Neck: normal range of motion  CV: normal S1, S2, regular rate and rhythm  Respiratory: diminished airflow, clear with symmetrical effort  Extremities: in wheelchair, cannot stand unassisted  Neurology: normal strength and sensation  Psych: active, alert and oriented, affect appropriate       Assessment and orders:     Diagnoses and all orders for this visit:    1. Failure to thrive in adult    2. Underweight  -     megestroL (MEGACE) 400 mg/10 mL (10 mL) suspension; Take 5 mL by mouth Before breakfast and dinner. 3. Severe protein-calorie malnutrition (HCC)  -     megestroL (MEGACE) 400 mg/10 mL (10 mL) suspension; Take 5 mL by mouth Before breakfast and dinner. 4. Severe chronic obstructive pulmonary disease ([address]    5. Systolic heart failure, unspecified HF chronicity ([address]    6. O2 dependent    7.  MS (multiple sclerosis) ([address]        Plan of care:  Diagnoses were discussed in detail with patient and her son. Importance of increased caloric intake stressed. HH has been ordered. Medications reviewed and appropriate. Patient to continue current prescribed medications as written. Medication risks/benefits/side effects discussed with patient. All of the patient's questions were addressed and answered to apparent satisfaction. The patient understands and agrees with our plan of care. The patient knows to call back if they have questions about the plan of care or if symptoms change. The patient received an After-Visit Summary which contains VS, diagnoses, orders, allergy and medication lists. No future appointments. Patient Care Team:  Sharon Zhu MD as PCP - General (Family Medicine)  Sharon Zhu MD as PCP - [facility] Empaneled Provider  Rosemary Torres MD as Physician (Gastroenterology)  Sharon Zhu MD (Family Medicine)        No future appointments.     Signed By: Melissa Carter MD     January 31, 2022

## 2022-02-01 ENCOUNTER — TELEPHONE (OUTPATIENT)
Dept: FAMILY MEDICINE CLINIC | Age: 67
End: 2022-02-01

## 2022-02-01 NOTE — TELEPHONE ENCOUNTER
Attempted to call. No answer. Message left. .  Ask patient's son if he has a preference where to send order for patient's O2 tank?

## 2022-02-18 ENCOUNTER — TELEPHONE (OUTPATIENT)
Dept: FAMILY MEDICINE CLINIC | Age: 67
End: 2022-02-18

## 2022-02-18 NOTE — TELEPHONE ENCOUNTER
----- Message from Jacob Kwong sent at 2/18/2022 12:15 PM EST -----  Subject: Message to Provider    QUESTIONS  Information for Provider? Pt of Dr. Leyda De Guzman; Bruno Brown from [facility] cannot   order oxygen for pt she is not a pt with them   ---------------------------------------------------------------------------  --------------  CALL BACK INFO  What is the best way for the office to contact you? Do not leave any   message, patient will call back for answer  Preferred Call Back Phone Number? [phone]  ---------------------------------------------------------------------------  --------------  SCRIPT ANSWERS  Relationship to Patient? Third Party  Representative Name?  [id number]

## 2022-02-18 NOTE — TELEPHONE ENCOUNTER
----- Message from Jacob Kwong sent at 2/18/2022 12:15 PM EST -----  Subject: Message to Provider    QUESTIONS  Information for Provider? Pt of Dr. Leyda De Guzman; Bruno Brown from [facility] cannot   order oxygen for pt she is not a pt with them   ---------------------------------------------------------------------------  --------------  CALL BACK INFO  What is the best way for the office to contact you? Do not leave any   message, patient will call back for answer  Preferred Call Back Phone Number? [phone]  ---------------------------------------------------------------------------  --------------  SCRIPT ANSWERS  Relationship to Patient? Third Party  Representative Name?  [id number]

## 2022-02-18 NOTE — TELEPHONE ENCOUNTER
Called patient's son at work number, unable to connect to clarify who is supplying patient's oxygen now. He was advised [facility] does not supply it.

## 2022-02-18 NOTE — TELEPHONE ENCOUNTER
Returned call to Carlton with [employer]. She stated ptient does not get her O2 from them any longer. She stated that she believes it is [employer]. She was advised patient's son asked to contact them.

## 2022-10-12 ENCOUNTER — TELEPHONE (OUTPATIENT)
Dept: FAMILY MEDICINE CLINIC | Age: 67
End: 2022-10-12

## 2024-04-24 NOTE — PROGRESS NOTES
Progress Note    Patient: Deborah Leonard MRN: [id number]  SSN: [id number]    YOB: 1955  Age: 59 y.o. Sex: female        Subjective:     Chief Complaint   Patient presents with    Annual Wellness Visit       HPI: she is a 59y.o. year old female  who presents with complaints of productive cough and SOB. She says this is worsening. There is a hx of asthma, COPD, allergic rhinitis and tobacco use. Patient relies on Combivent for her symptoms. She is followed by pain management and is on opioid medication. Patient denies HA, dizziness, CP, abdominal pain, dysuria, weakness or paresthesia. BP elevated. She is underweight and blames this on life stressors. Encounter Diagnoses   Name Primary?  COPD exacerbation ([facility] Yes    MS (multiple sclerosis) ([facility]     Age-related osteoporosis without current pathological fracture     Underweight     Medicare annual wellness visit, subsequent     Screening for depression     Screening for alcoholism        BP Readings from Last 3 Encounters:   07/22/19 112/66   03/26/19 146/79   07/20/18 125/68     Wt Readings from Last 3 Encounters:   07/22/19 81 lb (36.7 kg)   03/26/19 84 lb 6.4 oz (38.3 kg)   07/20/18 86 lb 12.8 oz (39.4 kg)     Body mass index is 13.07 kg/m². Current and past medical information:    Current Medications after this visit[de-identified]     Current Outpatient Medications   Medication Sig    ibandronate (BONIVA) 150 mg tablet Take 150 mg by mouth every thirty (30) days.  azithromycin (ZITHROMAX) 250 mg tablet Take 2 tablets today, then take 1 tablet daily    predniSONE (DELTASONE) 20 mg tablet Take 20 mg by mouth two (2) times a day for 5 days.  ipratropium-albuterol (COMBIVENT RESPIMAT)  mcg/actuation inhaler USE 1 PUFF BY MOUTH EVERY 4 HOURS as needed.  salmeterol (SEREVENT) 50 mcg/dose dsdv Take 1 Puff by inhalation two (2) times a day.     albuterol-ipratropium (DUO-NEB) 2.5 mg-0.5 mg/3 ml nebu 3 mL by Nebulization route every four (4) hours as needed.  cyclobenzaprine (FLEXERIL) 10 mg tablet Take 1 Tab by mouth three (3) times daily as needed for Muscle Spasm(s).  oxyCODONE-acetaminophen (PERCOCET 10)  mg per tablet Take 1 Tab by mouth.  Nebulizer & Compressor machine For use as breathing treatment every 4 - 6 hours as needed.  Nebulizer Accessories kit For use as breathing treatment every 4 - 6 hours as needed.  Lancets misc One Touch Lancets-Test blood sugar  daily for DM-2 (250.00)    glucose blood VI test strips (ONE TOUCH ULTRA TEST) strip Test blood sugar  daily for DM-2 (250.00)     No current facility-administered medications for this visit.         Patient Active Problem List    Diagnosis Date Noted    Back muscle spasm 06/28/2016    Depression 09/19/2014    Anorexia 09/18/2014    Back pain 07/15/2011    Anxiety 02/09/2011    Tobacco abuse 12/06/2010    Asthma 12/06/2010    COPD (chronic obstructive pulmonary disease) ([facility] [address] 12/06/2010    Hyperlipemia 06/16/2010    Neuropathy     Fibromyalgia     [facility]        Past Medical History:   Diagnosis Date    Anxiety     Asthma 12/6/2010    COPD (chronic obstructive pulmonary disease) ([facility] [address] 12/6/2010    Depression 9/19/2014    Fibromyalgia     Hyperlipemia 6/16/2010    [facility] sclerosis) ([facility]     Neuropathy     Syncope        Allergies   Allergen Reactions    Evista [Raloxifene] Diarrhea and Swelling     Makes throat swell     Doxycycline Swelling     Tongue      Pcn [Penicillins] Swelling     Of the tongue    Spiriva With Handihaler [Tiotropium Bromide] Other (comments)     Chest pain        Past Surgical History:   Procedure Laterality Date    HX APPENDECTOMY         Social History     Socioeconomic History    Marital status:      Spouse name: Not on file    Number of children: Not on file    Years of education: Not on file    Highest education level: Not on file   Tobacco Use    Smoking status: Former Smoker     Packs/day: 0.50     Years: 40.00     Pack years: 20.00     Last attempt to quit: 2018     Years since quittin.1    Smokeless tobacco: Never Used    Tobacco comment: states has been trying to stop-given stop smoking information   Substance and Sexual Activity    Alcohol use: No    Drug use: No       Objective:     Review of Systems:  Constitutional: Negative for fatigue or malaise  Derm: Negative for rash or lesion  HEENT: Negative for acute hearing or vision changes  Cardiovascular: Negative for dizziness, chest pain or palpitations  Respiratory: see HPI  Gastreintestinal: Negative for nausea or abdominal pain  Genital/urinary: Negative for dysuria or voiding dysfunction  Muscoloskeletal: see HPI, Negative for acute myalgias or arthralgias   Neurological: Negative for headache, weakness or paresthesia  Psychological: Negative for depression or anxiety      Vitals:    19 1259   BP: 112/66   Pulse: 89   Resp: 16   Temp: 98 °F (36.7 °C)   TempSrc: Oral   SpO2: 94%   Weight: 81 lb (36.7 kg)   Height: 5' 6\" (1.676 m)      Body mass index is 13.07 kg/m². Physical Exam:  Constitutional: thin, in no acute distress  Skin: warm and dry, normal tone and turgor  Head: normocephalic, atraumatic  Eyes: sclera clear, EOMI  Neck: normal range of motion  CV: normal S1, S2, regular rate and rhythm  Respiratory: diminished airflow, clear to auscultation bilaterally with symmetrical effort  Extremities: full range of motion  Neurology: normal strength and sensation  Psych: active, alert and oriented, affect appropriate       Assessment and orders:     Diagnoses and all orders for this visit:    1. COPD exacerbation (HCC)  -     azithromycin (ZITHROMAX) 250 mg tablet; Take 2 tablets today, then take 1 tablet daily  -     predniSONE (DELTASONE) 20 mg tablet; Take 20 mg by mouth two (2) times a day for 5 days.     2. MS (multiple sclerosis) ([facility]    3. Age-related osteoporosis without current pathological fracture  -     ibandronate (BONIVA) 150 mg tablet; Take 150 mg by mouth every thirty (30) days. 4. Underweight      Plan of care:  Diagnoses were discussed in detail with patient. Medication risks/benefits/side effects discussed with patient. All of the patient's questions were addressed. The patient understands and agrees with our plan of care. The patient knows to call back if they are unsure of or forgets any changes we discussed today or if the symptoms change. The patient received an After-Visit Summary which contains VS, orders, allergy and medication lists. Patient Care Team:  Najma Menendez MD as PCP - General (Family Practice)  Larry Jones MD as Physician (Gastroenterology)  Najma Menendez MD (Family Practice)    Follow-up and Dispositions    · Return in about 4 months (around 12/2/2019), or if symptoms worsen or fail to improve. Future Appointments   Date Time Provider Federico Sales   12/4/2019  1:30 PM Najma Menendez MD [facility]       Signed By: Maddy Aguirre MD     July 22, 2019        This is the Subsequent Medicare Annual Wellness Exam, performed 12 months or more after the Initial AWV or the last Subsequent AWV    I have reviewed the patient's medical history in detail and updated the computerized patient record. History     Past Medical History:   Diagnosis Date    Anxiety     Asthma 12/6/2010    COPD (chronic obstructive pulmonary disease) ([facility] [address] 12/6/2010    Depression 9/19/2014    Fibromyalgia     Hyperlipemia 6/16/2010    MS (multiple sclerosis) ([facility] [address]     Neuropathy     Syncope       Past Surgical History:   Procedure Laterality Date    HX APPENDECTOMY       Current Outpatient Medications   Medication Sig Dispense Refill    ibandronate (BONIVA) 150 mg tablet Take 150 mg by mouth every thirty (30) days.  3 Tab 3    azithromycin (ZITHROMAX) 250 mg tablet Take 2 tablets today, then take 1 tablet daily 6 Tab 0    predniSONE (DELTASONE) 20 mg tablet Take 20 mg by mouth two (2) times a day for 5 days. 10 Tab 0    ipratropium-albuterol (COMBIVENT RESPIMAT)  mcg/actuation inhaler USE 1 PUFF BY MOUTH EVERY 4 HOURS as needed. 4 Each 3    salmeterol (SEREVENT) 50 mcg/dose dsdv Take 1 Puff by inhalation two (2) times a day. 60 Blister 1    albuterol-ipratropium (DUO-NEB) 2.5 mg-0.5 mg/3 ml nebu 3 mL by Nebulization route every four (4) hours as needed. 90 Nebule 1    cyclobenzaprine (FLEXERIL) 10 mg tablet Take 1 Tab by mouth three (3) times daily as needed for Muscle Spasm(s). 20 Tab 1    oxyCODONE-acetaminophen (PERCOCET 10)  mg per tablet Take 1 Tab by mouth.  0    Nebulizer & Compressor machine For use as breathing treatment every 4 - 6 hours as needed. 1 Each 0    Nebulizer Accessories kit For use as breathing treatment every 4 - 6 hours as needed.  1 Kit 0    Lancets misc One Touch Lancets-Test blood sugar  daily for DM-2 (250.00) 1 Package 11    glucose blood VI test strips (ONE TOUCH ULTRA TEST) strip Test blood sugar  daily for DM-2 (250.00) 100 strip 11     Allergies   Allergen Reactions    Evista [Raloxifene] Diarrhea and Swelling     Makes throat swell     Doxycycline Swelling     Tongue      Pcn [Penicillins] Swelling     Of the tongue    Spiriva With Handihaler [Tiotropium Bromide] Other (comments)     Chest pain      Family History   Problem Relation Age of Onset    Hypertension Father     Alcohol abuse Father     Kidney Disease Mother     Lung Disease Sister         COPD    Arthritis-rheumatoid Neg Hx     Asthma Neg Hx     Bleeding Prob Neg Hx     Cancer Neg Hx     Diabetes Neg Hx     Elevated Lipids Neg Hx     Headache Neg Hx     Heart Disease Neg Hx     Migraines Neg Hx     Psychiatric Disorder Neg Hx     Stroke Neg Hx     Mental Retardation Neg Hx     Anesth Problems Neg Hx      Social History     Tobacco Use    Smoking status: Former Smoker     Packs/day: 0.50     Years: 40.00 Pack years: 20.00     Last attempt to quit: 2018     Years since quittin.1    Smokeless tobacco: Never Used    Tobacco comment: states has been trying to stop-given stop smoking information   Substance Use Topics    Alcohol use: No     Patient Active Problem List   Diagnosis Code    Neuropathy G62.9    Fibromyalgia M79.7    MS (multiple sclerosis) ([facility] G35    Hyperlipemia E78.5    Tobacco abuse Z72.0    Asthma J45.909    COPD [facility]ch[facility] obstructive [facility] disease) [facility] J44.9    Anxiety F41.9    Back pain M54.9    Anorexia R63.0    Depression F32.9    Back muscle spasm M62.830       Depression Risk Factor Screening:     3 most recent PHQ Screens 3/26/2019   Little interest or pleasure in doing things Not at all   Feeling down, depressed, irritable, or hopeless Not at all   Total Score PHQ 2 0     Alcohol Risk Factor Screening: You do not drink alcohol or very rarely. Functional Ability and Level of Safety:   Hearing Loss  Hearing is good. Activities of Daily Living  The home contains: no safety equipment. Patient does total self care    Fall Risk  Fall Risk Assessment, last 12 mths 2014   Able to walk? Yes   Fall in past 12 months? No       Abuse Screen  Patient is not abused    Cognitive Screening   Evaluation of Cognitive Function:  Has your family/caregiver stated any concerns about your memory: no  Normal    Patient Care Team   Patient Care Team:  Monserrat De Leon MD as PCP - General (Family Practice)  Dino Nunes MD as Physician (Gastroenterology)  Monserrat De Leon MD (Family Practice)    Assessment/Plan   Education and counseling provided:  Are appropriate based on today's review and evaluation  End-of-Life planning (with patient's consent)    Diagnoses and all orders for this visit:    1. Medicare annual wellness visit, subsequent    2. Screening for depression  -     DEPRESSION SCREEN ANNUAL    3.  Screening for alcoholism  -     AK ANNUAL ALCOHOL SCREEN 15 MIN warm

## 2025-03-27 NOTE — TELEPHONE ENCOUNTER
Subjective:         Patient ID: Martita Hilliard is a 49 y.o. female.    Chief Complaint: Neck Pain (Right side) and Wrist Pain (right)      Pain  This is a chronic problem. The current episode started more than 1 year ago. The problem occurs daily. The problem has been waxing and waning. Associated symptoms include arthralgias. Pertinent negatives include no anorexia, change in bowel habit, chest pain, chills, coughing, fever, sore throat, vertigo or vomiting.     Review of Systems   Constitutional:  Negative for activity change, chills, fever and unexpected weight change.   HENT:  Negative for drooling, ear discharge, ear pain, facial swelling, nosebleeds, sore throat, trouble swallowing, voice change and goiter.    Eyes:  Negative for photophobia, pain, discharge, redness and visual disturbance.   Respiratory:  Negative for apnea, cough, choking, chest tightness, shortness of breath, wheezing and stridor.    Cardiovascular:  Negative for chest pain, palpitations and leg swelling.   Gastrointestinal:  Negative for abdominal distention, anorexia, change in bowel habit, diarrhea, vomiting and fecal incontinence.   Endocrine: Negative for cold intolerance, heat intolerance, polydipsia, polyphagia and polyuria.   Genitourinary:  Negative for bladder incontinence, dysuria, flank pain, frequency and hot flashes.   Musculoskeletal:  Positive for arthralgias, back pain and leg pain.   Integumentary:  Negative for color change and pallor.   Allergic/Immunologic: Negative for immunocompromised state.   Neurological:  Negative for dizziness, vertigo, seizures, syncope, facial asymmetry, speech difficulty, light-headedness and coordination difficulties.   Hematological:  Negative for adenopathy. Does not bruise/bleed easily.   Psychiatric/Behavioral:  Negative for agitation, behavioral problems, confusion, decreased concentration, dysphoric mood, hallucinations and self-injury. The patient is not nervous/anxious and is not  Will need VV for referral back to [facility]. "hyperactive.            Past Medical History:   Diagnosis Date    Cervical radiculopathy 10/23/2024    Chronic pancreatitis     she told me this in 2022 when admitted for same thing    Chronic right-sided low back pain with right-sided sciatica 09/26/2024    Crohn's disease     per patient history; no pathology    Degenerative disorder of bone     Essential (primary) hypertension     History of herpes genitalis     Dr. Adrian Harrell    Marijuana dependence     Migraine without aura and without status migrainosus, not intractable 11/22/2024    Dr. [facility]    Tobacco dependence      Past Surgical History:   Procedure Laterality Date    CLOSED REDUCTION OF FRACTURE OF NASAL BONE N/A 2/20/2024    Procedure: CLOSED REDUCTION, FRACTURE, NASAL BONE;  Surgeon: Karlos Gallegos MD;  Location: [facility] OR;  Service: ENT;  Laterality: N/A;    CYSTOSCOPY N/A 6/28/2023    Procedure: CYSTOSCOPY;  Surgeon: Christian Jurado MD;  Location: [facility] OR;  Service: OB/GYN;  Laterality: N/A;    HYSTERECTOMY, TOTAL, LAPAROSCOPIC, WITH SALPINGO-OOPHORECTOMY Bilateral 6/28/2023    Procedure: HYSTERECTOMY,TOTAL,LAPAROSCOPIC,WITH SALPINGO-OOPHORECTOMY;  Surgeon: Christian Jurado MD;  Location: [facility] OR;  Service: OB/GYN;  Laterality: Bilateral;    SINUS SURGERY      TONSILLECTOMY AND ADENOIDECTOMY      TUBAL LIGATION       Social History[1]  Family History   Problem Relation Name Age of Onset    Hypertension Mother      Diabetes Mellitus Mother      Cancer Mother      Hypertension Child       Review of patient's allergies indicates:   Allergen Reactions    Ace inhibitors Swelling    Azithromycin Swelling    Ferrous sulfate Anaphylaxis    Penicillins     Rocephin [ceftriaxone]     Terazol 3 [terconazole] Other (See Comments)     Vaginal irritation and swollen        Objective:  Vitals:    04/02/25 0904   BP: (!) 174/80   Pulse: 69   Resp: 18   Weight: 73.5 kg (162 lb)   Height: 5' 3" (1.6 m)   PainSc:   9 "   PainLoc: Neck         Physical Exam  Vitals and nursing note reviewed. Exam conducted with a chaperone present.   Constitutional:       General: She is awake. She is not in acute distress.     Appearance: Normal appearance. She is not ill-appearing, toxic-appearing or diaphoretic.   HENT:      Head: Normocephalic and atraumatic.      Nose: Nose normal.      Mouth/Throat:      Mouth: Mucous membranes are moist.      Pharynx: Oropharynx is clear.   Eyes:      Conjunctiva/sclera: Conjunctivae normal.      Pupils: Pupils are equal, round, and reactive to light.   Cardiovascular:      Rate and Rhythm: Normal rate.   Pulmonary:      Effort: Pulmonary effort is normal. No respiratory distress.   Abdominal:      Palpations: Abdomen is soft.      Tenderness: There is no guarding.   Musculoskeletal:         General: Normal range of motion.      Cervical back: Normal range of motion and neck supple. No rigidity.   Skin:     General: Skin is warm and dry.      Coloration: Skin is not jaundiced or pale.   Neurological:      General: No focal deficit present.      Mental Status: She is alert and oriented to person, place, and time. Mental status is at baseline.      Cranial Nerves: No cranial nerve deficit (II-XII).   Psychiatric:         Mood and Affect: Mood normal.         Behavior: Behavior normal. Behavior is cooperative.         Thought Content: Thought content normal.           EGD  Narrative: Table formatting from the original result was not included.  Procedure Date  3/24/25    Impression  Overall   Impression:    The esophagus appeared normal.  Abnormal mucosa in the fundus of the stomach; performed cold forceps   biopsy  Performed forceps biopsies in the body of the stomach and antrum to rule   out H. pylori  The duodenal bulb and 2nd part of the duodenum appeared normal.    Recommendation  -History of chronic nausea and vomiting with melena which is related to   hemorrhagic gastritis from vomiting  -Continue PPI IV  BID during hospitalization then can transition to PO BID   upon discharge  -Anti-emetics as needed  -Recommend outpatient colonoscopy    Indication  Nausea and vomiting    Post Procedure Diagnosis  Other acute gastritis with hemorrhage    Staff present during procedure  Lisa Hamm CRNA CRNA Hickman, Donna, ST Technician   Dagoberto Linares RN Registered Nurse   Dev Holbrook MD Proceduralist     Medications  General anesthesia - See anesthesia record.    Preprocedure  A history and physical has been performed, and patient medication   allergies have been reviewed. The patient's tolerance of previous   anesthesia has been reviewed. The risks and benefits of the procedure and   the sedation options and risks were discussed with the patient. All   questions were answered and informed consent obtained.    Details of the Procedure  The patient underwent general anesthesia, which was administered by an   anesthesia professional. The patient's blood pressure, heart rate, level   of consciousness, oxygen, respirations, ECG and ETCO2 were monitored   throughout the procedure. The scope was introduced through the mouth and   advanced to the third part of the duodenum. Retroflexion was performed in   the cardia. The patient's estimated blood loss was minimal (<5 mL). The   procedure was not difficult. The patient tolerated the procedure well.   There were no apparent adverse events.     Scope: Gastroscope  Scope Serial: [id number]    Events  Procedure Events   Event Event Time     Procedure Events   Event Event Time   SCOPE IN 3/24/2025  2:12 PM   SCOPE OUT 3/24/2025  2:19 PM     Findings  The esophagus appeared normal.  Abnormal mucosa in the fundus of the stomach; performed cold forceps   biopsy. Hemorrhagic gastritis  Performed multiple forceps biopsies in the body of the stomach and antrum   to rule out H. pylori  The duodenal bulb and 2nd part of the duodenum appeared normal.  X-Ray Chest AP Portable  Narrative:  EXAMINATION:  XR CHEST AP PORTABLE    CLINICAL HISTORY:  Near-syncope;    TECHNIQUE:  Single view of the chest was obtained.    COMPARISON:  Multiple priors, most recent 05/19/2023    FINDINGS:  Normal cardiomediastinal contour. No focal consolidation, pleural effusion or pneumothorax.  Impression: No acute cardiopulmonary abnormality.    Electronically signed by: Deya Martin  Date:    03/24/2025  Time:    08:53  CT Abdomen Pelvis With IV Contrast NO Oral Contrast  Narrative: EXAMINATION:  CT ABDOMEN PELVIS WITH IV CONTRAST    CLINICAL HISTORY:  Abdominal abscess/infection suspected;    TECHNIQUE:  Low dose axial images, sagittal and coronal reformations were obtained from the lung bases to the pubic symphysis following the IV administration of 65 mL of Isovue 370 .  Oral contrast was not administered.    COMPARISON:  12/03/2022    FINDINGS:  Abdomen:    - Lower thorax:Small hiatal hernia.    - Lung bases: No infiltrates and no nodules.    - Liver: No focal mass.    - Gallbladder: No calcified gallstones.    - Bile Ducts: No evidence of intra or extra hepatic biliary ductal dilation.    - Spleen: Negative.    - Kidneys: No mass or hydronephrosis.    - Adrenals: Unremarkable.    - Pancreas: No mass or peripancreatic fat stranding.    - Retroperitoneum:  No significant adenopathy.    - Vascular: No abdominal aortic aneurysm.    - Abdominal wall:  Unremarkable.    The appendix is within normal limits.    Pelvis:    No pelvic mass, adenopathy, or free fluid.    Bowel/Mesentery:    No evidence of bowel obstruction or inflammation.    Bones:  No acute osseous abnormality and no suspicious lytic or blastic lesion.    Moderate disc space narrowing and vacuum disc phenomena at L5-S1.  Moderate bilateral foraminal narrowing.  Impression: 1. No acute abnormality  2. Small hiatal hernia.    Electronically signed by: Lyle Paul  Date:    03/24/2025  Time:    00:13       Admission on 03/23/2025, Discharged on 03/25/2025    Component Date Value Ref Range Status    Sodium 03/23/2025 137  136 - 145 mmol/L Final    Potassium 03/23/2025 2.9 (L)  3.5 - 5.1 mmol/L Final    Chloride 03/23/2025 102  98 - 107 mmol/L Final    CO2 03/23/2025 19 (L)  22 - 29 mmol/L Final    Anion Gap 03/23/2025 19 (H)  7 - 16 mmol/L Final    Glucose 03/23/2025 145 (H)  74 - 100 mg/dL Final    BUN 03/23/2025 13  7 - 19 mg/dL Final    Creatinine 03/23/2025 0.82  0.55 - 1.02 mg/dL Final    BUN/Creatinine Ratio 03/23/2025 16  6 - 20 Final    Calcium 03/23/2025 9.3  8.4 - 10.2 mg/dL Final    Total Protein 03/23/2025 7.6  6.4 - 8.3 g/dL Final    Albumin 03/23/2025 4.2  3.5 - 5.0 g/dL Final    Globulin 03/23/2025 3.4  2.0 - 4.0 g/dL Final    A/G Ratio 03/23/2025 1.2   Final    Bilirubin, Total 03/23/2025 0.4  <=1.5 mg/dL Final    Alk Phos 03/23/2025 123  40 - 150 U/L Final    ALT 03/23/2025 13  <=55 U/L Final    AST 03/23/2025 30  11 - 45 U/L Final    eGFR 03/23/2025 88  >=60 mL/min/1.73m2 Final    Lipase 03/23/2025 25  <=60 U/L Final    Color, UA 03/23/2025 Yellow  Colorless, Straw, Yellow, Dark Yellow, Light Yellow Final    Clarity, UA 03/23/2025 Clear  Clear Final    pH, UA 03/23/2025 5.5  5.0 to 8.0 pH Units Final    Leukocytes, UA 03/23/2025 Negative  Negative Final    Nitrites, UA 03/23/2025 Negative  Negative Final    Protein, UA 03/23/2025 Negative  Negative Final    Glucose, UA 03/23/2025 Normal  Normal mg/dL Final    Ketones, UA 03/23/2025 Negative  Negative mg/dL Final    Urobilinogen, UA 03/23/2025 Normal  0.2, 1.0, Normal mg/dL Final    Bilirubin, UA 03/23/2025 Negative  Negative Final    Blood, UA 03/23/2025 Large (A)  Negative Final    Specific Gravity, UA 03/23/2025 1.023  <=1.030 Final    Magnesium 03/23/2025 1.9  1.6 - 2.6 mg/dL Final    WBC 03/23/2025 19.72 (H)  4.50 - 11.00 K/uL Final    RBC 03/23/2025 4.84  4.20 - 5.40 M/uL Final    Hemoglobin 03/23/2025 12.8  12.0 - 16.0 g/dL Final    Hematocrit 03/23/2025 40.1  38.0 - 47.0 % Final    MCV  03/23/2025 82.9  80.0 - 96.0 fL Final    MCH 03/23/2025 26.4 (L)  27.0 - 31.0 pg Final    MCHC 03/23/2025 31.9 (L)  32.0 - 36.0 g/dL Final    RDW 03/23/2025 13.8  11.5 - 14.5 % Final    Platelet Count 03/23/2025 261  150 - 400 K/uL Final    MPV 03/23/2025 10.4  9.4 - 12.4 fL Final    Neutrophils % 03/23/2025 81.9 (H)  53.0 - 65.0 % Final    Lymphocytes % 03/23/2025 12.1 (L)  27.0 - 41.0 % Final    Monocytes % 03/23/2025 4.7  2.0 - 6.0 % Final    Eosinophils % 03/23/2025 0.3 (L)  1.0 - 4.0 % Final    Basophils % 03/23/2025 0.4  0.0 - 1.0 % Final    Immature Granulocytes % 03/23/2025 0.6 (H)  0.0 - 0.4 % Final    nRBC, Auto 03/23/2025 0.0  <=0.0 % Final    Neutrophils, Abs 03/23/2025 16.17 (H)  1.80 - 7.70 K/uL Final    Lymphocytes, Absolute 03/23/2025 2.39  1.00 - 4.80 K/uL Final    Monocytes, Absolute 03/23/2025 0.92 (H)  0.00 - 0.80 K/uL Final    Eosinophils, Absolute 03/23/2025 0.05  0.00 - 0.50 K/uL Final    Basophils, Absolute 03/23/2025 0.07  0.00 - 0.20 K/uL Final    Immature Granulocytes, Absolute 03/23/2025 0.12 (H)  0.00 - 0.04 K/uL Final    nRBC, Absolute 03/23/2025 0.00  <=0.00 x10e3/uL Final    Diff Type 03/23/2025 Auto   Final    Fecal Occult Blood 03/23/2025 Positive   Final    Culture, Blood 03/23/2025 No Growth at 5 days   Final    Culture, Blood 03/23/2025 No Growth at 5 days   Final    WBC, UA 03/23/2025 2  <=5 /hpf Final    RBC, UA 03/23/2025 7 (H)  <=3 /hpf Final    Bacteria, UA 03/23/2025 Few (A)  None Seen /hpf Final    Squamous Epithelial Cells, UA 03/23/2025 Occasional (A)  None Seen /HPF Final    Mucous 03/23/2025 Occasional (A)  None Seen /LPF Final    POC PH 03/23/2025 7.35  7.32 - 7.42 Final    POC PCO2 03/23/2025 12 (LL)  41 - 51 mmHg Final    POC PO2 03/23/2025 47 (H)  25 - 40 mmHg Final    POC Sodium 03/23/2025 150 (H)  136 - 145 mmol/L Final    POC Potassium 03/23/2025 0.6 (LL)  3.4 - 4.5 mmol/L Final    POC Ionized Calcium 03/23/2025 0.37 (LL)  1.15 - 1.35 mmol/L Final    POCT  Glucose 03/23/2025 37 (L)  60 - 95 mg/dL Final    POC Lactate 03/23/2025 0.4  0.3 - 1.2 mmol/L Final    POC Hematocrit 03/23/2025 <15 (LL)  35 - 51 % Final    POC HCO3 03/23/2025 6.6 (LL)  24.0 - 28.0 mmol/L Final    POC CO2 03/23/2025 7.0  mmol/L Final    POC Base Excess 03/23/2025 -16.1 (L)  -2.0 - 3.0 mmol/L Final    POC SATURATED O2 03/23/2025 80 (H)  40 - 70 % Final    Barbiturates, Urine 03/24/2025 Negative  Negative Final    Benzodiazepine, Urine 03/24/2025 Negative  Negative Final    Opiates, Urine 03/24/2025 Negative  Negative Final    Phencyclidine, Urine 03/24/2025 Negative  Negative Final    Amphetamine, Urine 03/24/2025 Negative  Negative Final    Cannabinoid, Urine 03/24/2025 Positive (A)  Negative Final    Cocaine, Urine 03/24/2025 Negative  Negative Final    QRS Duration 03/23/2025 80  ms Final    [facility] QTC Calculation 03/23/2025 443  ms Final    Case Report 03/24/2025    Final                    Value:Surgical Pathology                                Case: [id number]                                   Authorizing Provider:  Dev Holbrook MD     Collected:           03/24/2025 02:15 PM          Ordering Location:     [facility] -         Received:            03/24/2025 03:37 PM                                 Endoscopy                                                                    Pathologist:           Michael Puente MD                                                           Specimens:   A) - Stomach, a. gastric fundus bx                                                                  B) - Stomach, b. gastric antrum and body bx                                                Final Diagnosis 03/24/2025    Final                    Value:A. Gastric fundus, biopsy:  Oxyntic and transitional mucosa with minimal chronic gastritis; H. pylori immunohistochemistry is positive    B. Gastric antrum and body, biopsies:  Antral and oxyntic mucosa with mild to moderate active chronic gastritis; H.  pylori immunohistochemistry is positive      Gross Description 03/24/2025    Final                    Value:A. Stomach: a. gastric fundus bx  The specimen is received in formalin designated a. gastric fundus bx and consists of a single pink-tan tissue fragment measures 0.4 cm in greatest dimension and is entirely submitted in cassette A1.    Grossing was completed by Zi Brewer.  B. Stomach: b. gastric antrum and body bx  The specimen is received in formalin designated b. gastric antrum and body bx and consists of 3 pink-tan tissue fragments that measure from 0.2-0.5 cm in greatest dimension and is entirely submitted in cassette B1.    Grossing was completed by Zi Brewer.      Microscopic Description 03/24/2025    Final                    Value:A microscopic examination was performed and the diagnosis reflects the findings.          Laboratory Notes 03/24/2025    Final                    Value:If this report includes immunohistochemical (IHC) test results, please note the following: IHC studies were interpreted in conjunction with appropriate positive and negative controls which demonstrate the expected positive and negative reactivity. This laboratory is regulated under CLIA as qualified to perform high-complexity testing. IHC tests are used for clinical purposes. They should not be regarded as investigational or research.        Sodium 03/25/2025 139  136 - 145 mmol/L Final    Potassium 03/25/2025 3.9  3.5 - 5.1 mmol/L Final    Chloride 03/25/2025 111 (H)  98 - 107 mmol/L Final    CO2 03/25/2025 24  22 - 29 mmol/L Final    Anion Gap 03/25/2025 8  7 - 16 mmol/L Final    Glucose 03/25/2025 80  74 - 100 mg/dL Final    BUN 03/25/2025 7  7 - 19 mg/dL Final    Creatinine 03/25/2025 0.71  0.55 - 1.02 mg/dL Final    BUN/Creatinine Ratio 03/25/2025 10  6 - 20 Final    Calcium 03/25/2025 8.5  8.4 - 10.2 mg/dL Final    Total Protein 03/25/2025 5.8 (L)  6.4 - 8.3 g/dL Final    Albumin 03/25/2025 3.2 (L)  3.5 -  5.0 g/dL Final    Globulin 03/25/2025 2.6  2.0 - 4.0 g/dL Final    A/G Ratio 03/25/2025 1.2   Final    Bilirubin, Total 03/25/2025 0.5  <=1.5 mg/dL Final    Alk Phos 03/25/2025 89  40 - 150 U/L Final    ALT 03/25/2025 9  <=55 U/L Final    AST 03/25/2025 19  11 - 45 U/L Final    eGFR 03/25/2025 104  >=60 mL/min/1.73m2 Final    WBC 03/25/2025 8.62  4.50 - 11.00 K/uL Final    RBC 03/25/2025 4.17 (L)  4.20 - 5.40 M/uL Final    Hemoglobin 03/25/2025 10.9 (L)  12.0 - 16.0 g/dL Final    Hematocrit 03/25/2025 35.4 (L)  38.0 - 47.0 % Final    MCV 03/25/2025 84.9  80.0 - 96.0 fL Final    MCH 03/25/2025 26.1 (L)  27.0 - 31.0 pg Final    MCHC 03/25/2025 30.8 (L)  32.0 - 36.0 g/dL Final    RDW 03/25/2025 14.4  11.5 - 14.5 % Final    Platelet Count 03/25/2025 211  150 - 400 K/uL Final    MPV 03/25/2025 10.5  9.4 - 12.4 fL Final    Neutrophils % 03/25/2025 58.8  53.0 - 65.0 % Final    Lymphocytes % 03/25/2025 33.8  27.0 - 41.0 % Final    Monocytes % 03/25/2025 5.2  2.0 - 6.0 % Final    Eosinophils % 03/25/2025 1.3  1.0 - 4.0 % Final    Basophils % 03/25/2025 0.8  0.0 - 1.0 % Final    Immature Granulocytes % 03/25/2025 0.1  0.0 - 0.4 % Final    nRBC, Auto 03/25/2025 0.0  <=0.0 % Final    Neutrophils, Abs 03/25/2025 5.07  1.80 - 7.70 K/uL Final    Lymphocytes, Absolute 03/25/2025 2.91  1.00 - 4.80 K/uL Final    Monocytes, Absolute 03/25/2025 0.45  0.00 - 0.80 K/uL Final    Eosinophils, Absolute 03/25/2025 0.11  0.00 - 0.50 K/uL Final    Basophils, Absolute 03/25/2025 0.07  0.00 - 0.20 K/uL Final    Immature Granulocytes, Absolute 03/25/2025 0.01  0.00 - 0.04 K/uL Final    nRBC, Absolute 03/25/2025 0.00  <=0.00 x10e3/uL Final    Diff Type 03/25/2025 Auto   Final   Office Visit on 03/13/2025   Component Date Value Ref Range Status    Triglycerides 03/13/2025 105  37 - 140 mg/dL Final    Cholesterol 03/13/2025 151  <=200 mg/dL Final    HDL Cholesterol 03/13/2025 48  35 - 60 mg/dL Final    Cholesterol/HDL Ratio (Risk Factor) 03/13/2025  3.1   Final    Non-HDL 03/13/2025 103  mg/dL Final    LDL Calculated 03/13/2025 82  mg/dL Final    LDL/HDL 03/13/2025 1.7   Final    VLDL 03/13/2025 21  mg/dL Final    Hemoglobin A1C 03/13/2025 5.4  <=7.0 % Final    Estimated Average Glucose 03/13/2025 108  mg/dL Final    Sodium 03/13/2025 144  136 - 145 mmol/L Final    Potassium 03/13/2025 3.6  3.5 - 5.1 mmol/L Final    Chloride 03/13/2025 105  98 - 107 mmol/L Final    CO2 03/13/2025 27  22 - 29 mmol/L Final    Anion Gap 03/13/2025 16  7 - 16 mmol/L Final    Glucose 03/13/2025 62 (L)  74 - 100 mg/dL Final    BUN 03/13/2025 9  7 - 19 mg/dL Final    Creatinine 03/13/2025 0.74  0.55 - 1.02 mg/dL Final    BUN/Creatinine Ratio 03/13/2025 12  6 - 20 Final    Calcium 03/13/2025 9.0  8.4 - 10.2 mg/dL Final    Total Protein 03/13/2025 7.3  6.4 - 8.3 g/dL Final    Albumin 03/13/2025 4.0  3.5 - 5.0 g/dL Final    Globulin 03/13/2025 3.3  2.0 - 4.0 g/dL Final    A/G Ratio 03/13/2025 1.2   Final    Bilirubin, Total 03/13/2025 0.2  <=1.5 mg/dL Final    Alk Phos 03/13/2025 103  40 - 150 U/L Final    ALT 03/13/2025 9  <=55 U/L Final    AST 03/13/2025 24  11 - 45 U/L Final    eGFR 03/13/2025 99  >=60 mL/min/1.73m2 Final    TSH 03/13/2025 3.264  0.350 - 4.940 uIU/mL Final    WBC 03/13/2025 10.90  4.50 - 11.00 K/uL Final    RBC 03/13/2025 4.64  4.20 - 5.40 M/uL Final    Hemoglobin 03/13/2025 12.1  12.0 - 16.0 g/dL Final    Hematocrit 03/13/2025 39.6  38.0 - 47.0 % Final    MCV 03/13/2025 85.3  80.0 - 96.0 fL Final    MCH 03/13/2025 26.1 (L)  27.0 - 31.0 pg Final    MCHC 03/13/2025 30.6 (L)  32.0 - 36.0 g/dL Final    RDW 03/13/2025 14.7 (H)  11.5 - 14.5 % Final    Platelet Count 03/13/2025 272  150 - 400 K/uL Final    MPV 03/13/2025 11.2  9.4 - 12.4 fL Final    Neutrophils % 03/13/2025 60.2  53.0 - 65.0 % Final    Lymphocytes % 03/13/2025 31.2  27.0 - 41.0 % Final    Monocytes % 03/13/2025 6.1 (H)  2.0 - 6.0 % Final    Eosinophils % 03/13/2025 1.4  1.0 - 4.0 % Final    Basophils %  03/13/2025 0.9  0.0 - 1.0 % Final    Immature Granulocytes % 03/13/2025 0.2  0.0 - 0.4 % Final    nRBC, Auto 03/13/2025 0.0  <=0.0 % Final    Neutrophils, Abs 03/13/2025 6.56  1.80 - 7.70 K/uL Final    Lymphocytes, Absolute 03/13/2025 3.40  1.00 - 4.80 K/uL Final    Monocytes, Absolute 03/13/2025 0.67  0.00 - 0.80 K/uL Final    Eosinophils, Absolute 03/13/2025 0.15  0.00 - 0.50 K/uL Final    Basophils, Absolute 03/13/2025 0.10  0.00 - 0.20 K/uL Final    Immature Granulocytes, Absolute 03/13/2025 0.02  0.00 - 0.04 K/uL Final    nRBC, Absolute 03/13/2025 0.00  <=0.00 x10e3/uL Final    Diff Type 03/13/2025 Auto   Final   Admission on 03/11/2025, Discharged on 03/11/2025   Component Date Value Ref Range Status    INFLUENZA A MOLECULAR 03/11/2025 Negative  Negative Final    INFLUENZA B MOLECULAR  03/11/2025 Negative  Negative Final    SARS COV-2 Molecular 03/11/2025 Negative  Negative, Invalid Final   Admission on 01/02/2025, Discharged on 01/02/2025   Component Date Value Ref Range Status    QRS Duration 01/02/2025 74  ms Final    [facility] QTC Calculation 01/02/2025 432  ms Final   Office Visit on 10/09/2024   Component Date Value Ref Range Status    POC Amphetamines 10/09/2024 Negative  Negative, Inconclusive Final    POC Barbiturates 10/09/2024 Negative  Negative, Inconclusive Final    POC Benzodiazepines 10/09/2024 Negative  Negative, Inconclusive Final    POC Cocaine 10/09/2024 Negative  Negative, Inconclusive Final    POC THC 10/09/2024 Negative  Negative, Inconclusive Final    POC Methadone 10/09/2024 Negative  Negative, Inconclusive Final    POC Methamphetamine 10/09/2024 Negative  Negative, Inconclusive Final    POC Opiates 10/09/2024 Negative  Negative, Inconclusive Final    POC Oxycodone 10/09/2024 Negative  Negative, Inconclusive Final    POC Phencyclidine 10/09/2024 Negative  Negative, Inconclusive Final    POC Methylenedioxymethamphetamine * 10/09/2024 Negative  Negative, Inconclusive Final    POC Tricyclic  Antidepressants 10/09/2024 Negative  Negative, Inconclusive Final    POC Buprenorphine 10/09/2024 Negative   Final     Acceptable 10/09/2024 Yes   Final    POC Temperature (Urine) 10/09/2024 92   Final    Creatinine, U 10/09/2024 121.6  mg/dL Final    Specific Gravity 10/09/2024 1.019   Final    pH 10/09/2024 5.5   Final    Oxidants 10/09/2024 Negative  Cutoff: 200 mg/L Final    Comment 10/09/2024 Normal   Final    Codeine 10/09/2024 Not Detected  Cutoff: 25 ng/mL Final    C6BG 10/09/2024 Not Detected  Cutoff: 100 ng/mL Final    Morphine 10/09/2024 Not Detected  Cutoff: 25 ng/mL Final    M6BG 10/09/2024 Not Detected  Cutoff: 100 ng/mL Final    6 Monoacetylmorphine 10/09/2024 Not Detected  Cutoff: 25 ng/mL Final    Hydrocodone 10/09/2024 Not Detected  Cutoff: 25 ng/mL Final    Norhydrocodone 10/09/2024 Not Detected  Cutoff: 25 ng/mL Final    Dihydrocodeine 10/09/2024 Not Detected  Cutoff: 25 ng/mL Final    Hydromorphone 10/09/2024 Not Detected  Cutoff: 25 ng/mL Final    Hydromorphone-3-beta-glucuronide 10/09/2024 Not Detected  Cutoff: 100 ng/mL Final    Oxycodone 10/09/2024 Not Detected  Cutoff: 25 ng/mL Final    Noroxycodone 10/09/2024 Not Detected  Cutoff: 25 ng/mL Final    Oxymorphone 10/09/2024 Not Detected  Cutoff: 25 ng/mL Final    Oxymorphone-3-beta-glucuronid 10/09/2024 Not Detected  Cutoff: 100 ng/mL Final    Noroxymorphone 10/09/2024 Not Detected  Cutoff: 25 ng/mL Final    Fentanyl 10/09/2024 Not Detected  Cutoff: 2 ng/mL Final    Norfentanyl 10/09/2024 Not Detected  Cutoff: 2 ng/mL Final    Meperidine 10/09/2024 Not Detected  Cutoff: 25 ng/mL Final    Normeperidine 10/09/2024 Not Detected  Cutoff: 25 ng/mL Final    Naloxone 10/09/2024 Not Detected  Cutoff: 25 ng/mL Final    Naloxone-3-beta-glucuronide 10/09/2024 Not Detected  Cutoff: 100 ng/mL Final    Methadone 10/09/2024 Not Detected  Cutoff: 25 ng/mL Final    EDDP 10/09/2024 Not Detected  Cutoff: 25 ng/mL Final    Propoxyphene 10/09/2024  Not Detected  Cutoff: 25 ng/mL Final    Norpropoxyphene 10/09/2024 Not Detected  Cutoff: 25 ng/mL Final    Tramadol 10/09/2024 Not Detected  Cutoff: 25 ng/mL Final    O-desmethyltramadol 10/09/2024 Not Detected  Cutoff: 25 ng/mL Final    Tapentadol 10/09/2024 Not Detected  Cutoff: 25 ng/mL Final    N-Desmethyltapentadol 10/09/2024 Not Detected  Cutoff: 50 ng/mL Final    M TAPENTADOL-BETA-GLUCURONIDE 10/09/2024 Not Detected  Cutoff: 100 ng/mL Final    Buprenorphine 10/09/2024 Not Detected  Cutoff: 5 ng/mL Final    Norbuprenorphine 10/09/2024 Not Detected  Cutoff: 5 ng/mL Final    Norbuprenorphine glucuronide 10/09/2024 Not Detected  Cutoff: 20 ng/mL Final    Opioid Interpretation 10/09/2024 SEE COMMENTS   Final    Cocaine 10/09/2024 Negative  Cutoff: 150 ng/mL Final    Tetrahydrocannabinol 10/09/2024 Presumptive Positive (A)  Cutoff: 50 ng/mL Final    Alprazolam 10/09/2024 Not Detected  Cutoff: 10 ng/mL Final    Alpha-Hydroxyalprazolam 10/09/2024 Not Detected  Cutoff: 10 ng/mL Final    Alpha-Hydroxyalprazolam Glucuronide 10/09/2024 Not Detected  Cutoff: 50 ng/mL Final    Chlordiazepoxide 10/09/2024 Not Detected  Cutoff: 10 ng/mL Final    Clobazam 10/09/2024 Not Detected  Cutoff: 10 ng/mL Final    N-Desmethylclobazam 10/09/2024 Not Detected  Cutoff: 200 ng/mL Final    Clonazepam 10/09/2024 Not Detected  Cutoff: 10 ng/mL Final    7-aminoclonazepam 10/09/2024 Not Detected  Cutoff: 10 ng/mL Final    Diazepam 10/09/2024 Not Detected  Cutoff: 10 ng/mL Final    Nordiazepam 10/09/2024 Not Detected  Cutoff: 10 ng/mL Final    Flunitrazepam 10/09/2024 Not Detected  Cutoff: 10 ng/mL Final    7-aminoflunitrazepam 10/09/2024 Not Detected  Cutoff: 10 ng/mL Final    Flurazepam 10/09/2024 Not Detected  Cutoff: 10 ng/mL Final    2-Hydroxy Ethyl Flurazepam 10/09/2024 Not Detected  Cutoff: 10 ng/mL Final    Lorazepam 10/09/2024 Not Detected  Cutoff: 10 ng/mL Final    Lorazepam Glucuronide 10/09/2024 Not Detected  Cutoff: 50 ng/mL Final     Midazolam 10/09/2024 Not Detected  Cutoff: 10 ng/mL Final    Alpha-Hydroxy Midazolam 10/09/2024 Not Detected  Cutoff: 10 ng/mL Final    Oxazepam 10/09/2024 Not Detected  Cutoff: 10 ng/mL Final    Oxazepam Glucuronide 10/09/2024 Not Detected  Cutoff: 50 ng/mL Final    Prazepam 10/09/2024 Not Detected  Cutoff: 10 ng/mL Final    Temazepam 10/09/2024 Not Detected  Cutoff: 10 ng/mL Final    Temazepam Glucuronide 10/09/2024 Not Detected  Cutoff: 50 ng/mL Final    Triazolam 10/09/2024 Not Detected  Cutoff: 10 ng/mL Final    Alpha-Hydroxy Triazolam 10/09/2024 Not Detected  Cutoff: 10 ng/mL Final    Zolpidem 10/09/2024 Not Detected  Cutoff: 10 ng/mL Final    Zolpidem Phenyl-4-Carboxylic acid 10/09/2024 Not Detected  Cutoff: 10 ng/mL Final    Benzodiazepine Interpretation 10/09/2024 SEE COMMENTS   Final    List Patient's Current Medications 10/09/2024 na   Final    Methamphetamine 10/09/2024 Not Detected  Cutoff: 100 ng/mL Final    Amphetamine 10/09/2024 Not Detected  Cutoff: 100 ng/mL Final    3,4-methylenedioxymethamphetamine * 10/09/2024 Not Detected  Cutoff: 100 ng/mL Final    3,9-uoqrzqpyrmazuz-C-ethylamphetam* 10/09/2024 Not Detected  Cutoff: 100 ng/mL Final    3,4-methylenedioxyamphetamine (MDA) 10/09/2024 Not Detected  Cutoff: 100 ng/mL Final    Ephedrine 10/09/2024 Not Detected  Cutoff: 100 ng/mL Final    Pseudoephedrine 10/09/2024 Not Detected  Cutoff: 100 ng/mL Final    Phentermine 10/09/2024 Not Detected  Cutoff: 100 ng/mL Final    Phencyclidine (PCP) 10/09/2024 Not Detected  Cutoff: 20 ng/mL Final    Methylphenidate 10/09/2024 Not Detected  Cutoff: 20 ng/mL Final    Ritalinic acid 10/09/2024 Not Detected  Cutoff: 100 ng/mL Final    Stimulant Interpretation 10/09/2024 SEE COMMENTS   Final    Barbiturates 10/09/2024 Negative  Cutoff: 200 ng/mL Final    Carboxy-THC- by GC/MS 10/09/2024 767  Cutoff: 5 ng/mL Final    Carboxy-THC Interpretation 10/09/2024 Positive.   Final    Delta-8 Carboxy-Tetrahydrocannabin*  10/09/2024 SEE COMMENTS  Cutoff: 5 ng/mL Final         Orders Placed This Encounter   Procedures    Controlled Substance Monitoring Panel, Random, Urine     Standing Status:   Future     Number of Occurrences:   1     Expected Date:   4/2/2025     Expiration Date:   6/1/2026     Send normal result to authorizing provider's In Basket if patient is active on MyChart::   Yes    CRP, High Sensitivity     Standing Status:   Future     Number of Occurrences:   1     Expected Date:   4/2/2025     Expiration Date:   7/2/2026     Send normal result to authorizing provider's In Basket if patient is active on MyChart::   Yes    Glutamic Acid Decarboxylase     Standing Status:   Future     Number of Occurrences:   1     Expected Date:   4/2/2025     Expiration Date:   6/1/2026    HLA B27 Antigen     Standing Status:   Future     Number of Occurrences:   1     Expected Date:   4/2/2025     Expiration Date:   7/2/2025    Varicella Zoster Antibody, IgG     Standing Status:   Future     Number of Occurrences:   1     Expected Date:   4/2/2025     Expiration Date:   6/1/2026    Procalcitonin     Standing Status:   Future     Number of Occurrences:   1     Expected Date:   4/3/2025     Expiration Date:   7/2/2026     Send normal result to authorizing provider's In Basket if patient is active on MyChart::   Yes    Quantiferon Gold TB     Standing Status:   Future     Number of Occurrences:   1     Expected Date:   4/2/2025     Expiration Date:   6/1/2026    Rheumatoid Quantitative     Standing Status:   Future     Number of Occurrences:   1     Expected Date:   4/2/2025     Expiration Date:   7/2/2025    Sedimentation Rate     Standing Status:   Future     Number of Occurrences:   1     Expected Date:   4/2/2025     Expiration Date:   7/2/2025    Treponema Pallidum (Syphillis) Antibody     Standing Status:   Future     Number of Occurrences:   1     Expected Date:   4/2/2025     Expiration Date:   7/2/2025    T4, Free     Standing  Status:   Future     Number of Occurrences:   1     Expected Date:   4/2/2025     Expiration Date:   7/2/2025    TSH     Standing Status:   Future     Number of Occurrences:   1     Expected Date:   4/2/2025     Expiration Date:   7/2/2025    Lupus Anticoagulant Eval w/Reflex     Standing Status:   Future     Number of Occurrences:   1     Expected Date:   4/2/2025     Expiration Date:   7/2/2025    Vitamin D     Standing Status:   Future     Number of Occurrences:   1     Expected Date:   4/2/2025     Expiration Date:   7/2/2025    Hepatitis C Antibody     Standing Status:   Future     Number of Occurrences:   1     Expected Date:   4/2/2025     Expiration Date:   7/2/2025     Release to patient:   Immediate    Uric Acid     Standing Status:   Future     Number of Occurrences:   1     Expected Date:   4/2/2025     Expiration Date:   7/2/2025    Anti-DNA Ab, Double-Stranded     Standing Status:   Future     Number of Occurrences:   1     Expected Date:   4/2/2025     Expiration Date:   7/2/2025    Ab to Extractable Nuclear Ag Eval,S     Standing Status:   Future     Number of Occurrences:   1     Expected Date:   4/2/2025     Expiration Date:   7/1/2026     Send normal result to authorizing provider's In Basket if patient is active on MyChart::   Yes    GERALDINE EIA w/ Reflex to dsDNA/ONI     Standing Status:   Future     Number of Occurrences:   1     Expected Date:   4/2/2025     Expiration Date:   7/2/2025    Antiextractable Nuclear Ag     Standing Status:   Future     Number of Occurrences:   1     Expected Date:   4/2/2025     Expiration Date:   7/1/2026     Send normal result to authorizing provider's In Basket if patient is active on MyChart::   Yes    C3 Complement     Standing Status:   Future     Number of Occurrences:   1     Expected Date:   4/2/2025     Expiration Date:   6/2/2025    C4 Complement     Standing Status:   Future     Number of Occurrences:   1     Expected Date:   4/2/2025     Expiration Date:    6/2/2025    CK     Standing Status:   Future     Number of Occurrences:   1     Expected Date:   4/2/2025     Expiration Date:   7/2/2025    Complement, Total     Standing Status:   Future     Number of Occurrences:   1     Expected Date:   4/2/2025     Expiration Date:   6/2/2025    Cyclic Citrullinated Peptide Antibody, IgG     Standing Status:   Future     Number of Occurrences:   1     Expected Date:   4/2/2025     Expiration Date:   6/1/2026    HIV 1/2 Ag/Ab (4th Gen)     Standing Status:   Future     Number of Occurrences:   1     Expected Date:   4/2/2025     Expiration Date:   6/2/2025     Release to patient:   Immediate    Ehrlichia Antibody Panel     Standing Status:   Future     Number of Occurrences:   1     Expected Date:   4/2/2025     Expiration Date:   7/2/2025    Spotted Fever Group Antibodies     Standing Status:   Future     Number of Occurrences:   1     Expected Date:   4/2/2025     Expiration Date:   7/2/2025    Lyme Antibody w/ Immunoblot Reflex     Standing Status:   Future     Number of Occurrences:   1     Expected Date:   4/2/2025     Expiration Date:   7/2/2025    Ambulatory referral/consult to Rheumatology     Standing Status:   Future     Expected Date:   4/10/2025     Expiration Date:   5/3/2026     Referral Priority:   Routine     Referral Type:   Consultation     Referral Reason:   Specialty Services Required     Referred to Provider:   Nolvia Prieto NP     Requested Specialty:   Rheumatology     Number of Visits Requested:   1    POCT Urine Drug Screen Presump     Interpretive Information:     Negative:  No drug detected at the cut off level.   Positive:  This result represents presumptive positive for the   tested drug, other substances may yield a positive response other   than the analyte of interest. This result should be utilized for   diagnostic purpose only. Confirmation testing will be performed upon physician request only.          Requested Prescriptions     Signed  Prescriptions Disp Refills    gabapentin (NEURONTIN) 300 MG capsule 90 capsule 0     Sig: Take 1 capsule (300 mg total) by mouth 3 (three) times daily... May cause drowsiness    ergocalciferol (VITAMIN D2) 50,000 unit Cap 8 capsule 0     Sig: Take 1 capsule (50,000 Units total) by mouth every 7 days. for 8 doses       Assessment:     1. Cervical radiculopathy    2. Lumbar radiculopathy, chronic    3. Encounter for long-term (current) use of medications    4. Vitamin D deficiency    5. Polyarthralgia    6. Myalgia    7. Antinuclear antibody (GERALDINE) positive         A's of Opioid Risk Assessment  Activity:  Current medication helps perform ADL.   Analgesia:Patients pain is partially controlled by current medication. Patient has tried OTC medications such as Tylenol and Ibuprofen with out relief.   Adverse Effects: Patient denies constipation or sedation.  Aberrant Behavior:  reviewed with no aberrant drug seeking/taking behavior.  Overdose reversal drug naloxone discussed    Drug screen reviewed      MRI cervical spine [facility] October 30, 2024  FINDINGS:  Alignment: Normal.     Vertebrae: Normal marrow signal. No fracture.     Discs: Normal height and signal.     Cord: Normal.     Skull base and craniocervical junction: Normal.     Degenerative findings:     C2-C3: There is no focal disc herniation.No significant central canal narrowing.  No significant neural foraminal narrowing.     C3-C4: Posterior marginal osteophytic spur effaces anterior thecal sac.No significant central canal narrowing.  No significant neural foraminal narrowing.     C4-C5: There is no focal disc herniation.No significant central canal narrowing.   Mild right neural foraminal narrowing.     C5-C6: Posterior marginal osteophytic spur effaces anterior thecal sac.Mild central canal narrowing. No significant central canal narrowing.   Mild bilateral neural foraminal narrowing.     C6-C7: Posterior marginal osteophytic spur effaces anterior  thecal sac.Mild central canal narrowing.  No significant neural foraminal narrowing.     C7-T1: There is no focal disc herniation.No significant central canal narrowing.  No significant neural foraminal narrowing.     Paraspinal muscles & soft tissues: Unremarkable.     Impression:     Posterior marginal osteophytic disc spur complex at C3-4 and C6-7       X-ray lumbar spine [facility] September 26, 2024  Degenerative facet changes are present from L3 through S1.  Mild degenerative disc changes are present at L2-3 and L3-4.  No subluxation is seen.  SI joints are normal.  There is abundant stool.     Impression:     Degenerative changes are present from L3 through S1.      Plan:    Sent to  March 27, 2025  No show  February 10, 2025  November 13, 2024  April 23, 2024  October 25, 2023    Former patient Dr. Olivares 2009    Has seen Neurology [facility] 2024 headaches    Insurance denied MRI lumbar spine October 9, 2024    Procedure not completed cervical epidural steroid injection January 30, 2025 January 22, 2025 telephone encounter patient reschedule procedure due to death in the family    Patient cancel cervical epidural steroid injection due to tooth abscess January 9, 2025 January 6, 2025 telephone encounter patient canceled procedures scheduled for January 9, 2025 had the flu    Physical therapy [facility] December 4, 2024      Patient verbalized understanding need to comply with narcotics agreement medication compliance, office visit compliant,    Patient verbalized understanding bring medication in the current bottle with correct date and remaining hydrocodone tablets to each visit    0 tolerance THC      Vitamin-D level 10.6, April 2, 2025  Started vitamin-D replacement    April 3, 2025 GERALDINE positive CRP elevated 1.2  Referral rheumatology [facility]      Patient presents clinic today complaint multiple areas of joint pain muscle pain upper lower extremities ongoing for many years getting  worse with time     She is requesting rheumatology evaluation    Will draw labs    Continue gabapentin as directed    Patient requesting to continue hydrocodone     She understands 0 tolerance THC     If she decides to stop using THC products will consider resuming hydrocodone    Continue home exercise program as directed    Follow-up 1 month discuss above studies    Dr. Hirsch 2025    Bring original prescription medication bottles/container/box with labels to each visit                [1]   Social History  Socioeconomic History    Marital status: Single   Tobacco Use    Smoking status: Former     Current packs/day: 0.00     Types: Cigarettes     Quit date: 3/1/2023     Years since quittin.0     Passive exposure: Current    Smokeless tobacco: Never   Substance and Sexual Activity    Alcohol use: Never    Drug use: Yes     Types: Marijuana     Comment: stopped 2 months ago    Sexual activity: Not Currently     Partners: Male     Birth control/protection: None     Social Drivers of Health     Financial Resource Strain: Medium Risk (4/3/2025)    Overall Financial Resource Strain (CARDIA)     Difficulty of Paying Living Expenses: Somewhat hard   Food Insecurity: Food Insecurity Present (4/3/2025)    Hunger Vital Sign     Worried About Running Out of Food in the Last Year: Often true     Ran Out of Food in the Last Year: Often true   Transportation Needs: Unmet Transportation Needs (4/3/2025)    PRAPARE - Transportation     Lack of Transportation (Medical): Yes     Lack of Transportation (Non-Medical): Yes   Physical Activity: Inactive (4/3/2025)    Exercise Vital Sign     Days of Exercise per Week: 0 days     Minutes of Exercise per Session: 30 min   Stress: Stress Concern Present (4/3/2025)    [employer] [place] [employer] - Occupational Stress Questionnaire     Feeling of Stress : To some extent   Housing Stability: High Risk (4/3/2025)    Housing Stability Vital Sign     Unable to Pay for  Housing in the Last Year: Yes     Number of Times Moved in the Last Year: 0     Homeless in the Last Year: No